# Patient Record
Sex: MALE | Race: WHITE | NOT HISPANIC OR LATINO | Employment: UNEMPLOYED | URBAN - METROPOLITAN AREA
[De-identification: names, ages, dates, MRNs, and addresses within clinical notes are randomized per-mention and may not be internally consistent; named-entity substitution may affect disease eponyms.]

---

## 2017-02-04 ENCOUNTER — OFFICE VISIT (OUTPATIENT)
Dept: RETAIL CLINIC | Facility: CLINIC | Age: 52
End: 2017-02-04

## 2017-02-04 VITALS — OXYGEN SATURATION: 96 % | HEART RATE: 94 BPM | TEMPERATURE: 98.9 F

## 2017-02-04 DIAGNOSIS — J06.9 UPPER RESPIRATORY INFECTION, ACUTE: ICD-10-CM

## 2017-02-04 DIAGNOSIS — J20.9 ACUTE BRONCHITIS WITH SYMPTOMS > 10 DAYS: Primary | ICD-10-CM

## 2017-02-04 PROCEDURE — 99213 OFFICE O/P EST LOW 20 MIN: CPT | Performed by: NURSE PRACTITIONER

## 2017-02-04 RX ORDER — AZITHROMYCIN 250 MG/1
TABLET, FILM COATED ORAL
Qty: 6 TABLET | Refills: 0 | Status: SHIPPED | OUTPATIENT
Start: 2017-02-04 | End: 2017-06-26

## 2017-02-04 RX ORDER — BENZONATATE 100 MG/1
100 CAPSULE ORAL 3 TIMES DAILY PRN
Qty: 15 CAPSULE | Refills: 0 | Status: SHIPPED | OUTPATIENT
Start: 2017-02-04 | End: 2017-02-09

## 2017-02-04 NOTE — PROGRESS NOTES
Lisa Buckley II is a 51 y.o. male who presents with a 2 week history of the following:     URI    This is a new problem. The current episode started 1 to 4 weeks ago. The problem has been gradually worsening. Maximum temperature: not monitored. Associated symptoms include congestion, coughing (productive purulent), ear pain (congestion), rhinorrhea, sinus pain and a sore throat. Pertinent negatives include no diarrhea, headaches, nausea or vomiting. He has tried acetaminophen and decongestant for the symptoms. The treatment provided mild relief.   Mr. Buckley if currently employed as a , and has frequent exposure to various illnesses.    The following portions of the patient's history were reviewed and updated as appropriate: allergies, current medications, past family history, past medical history, past social history, past surgical history and problem list.    Review of Systems   Constitutional: Negative.    HENT: Positive for congestion, ear pain (congestion), postnasal drip, rhinorrhea, sinus pressure and sore throat.    Respiratory: Positive for cough (productive purulent).    Cardiovascular: Negative.    Gastrointestinal: Negative.  Negative for diarrhea, nausea and vomiting.   Musculoskeletal: Negative.    Neurological: Negative for headaches.   Psychiatric/Behavioral: Negative.        Objective   Physical Exam   Constitutional: He appears well-developed and well-nourished.   HENT:   Head: Normocephalic and atraumatic.   Right Ear: Hearing, external ear and ear canal normal. A middle ear effusion is present.   Left Ear: Hearing, external ear and ear canal normal. A middle ear effusion is present.   Nose: Mucosal edema present. Right sinus exhibits maxillary sinus tenderness. Left sinus exhibits maxillary sinus tenderness.   Mouth/Throat: Mucous membranes are normal. Uvula swelling present. Posterior oropharyngeal erythema present. No tonsillar exudate.   Eyes: Pupils are equal, round, and  reactive to light.   Neck: Normal range of motion.   Cardiovascular: Normal rate, regular rhythm and normal heart sounds.    Pulmonary/Chest: Effort normal. He has decreased breath sounds in the right middle field and the left middle field. He has wheezes in the right upper field. He has rhonchi in the right upper field.   Abdominal: Soft.     Vitals:    02/04/17 1824   Pulse: 94   Temp: 98.9 °F (37.2 °C)   SpO2: 96%       Assessment/Plan   Amrita was seen today for uri.    Diagnoses and all orders for this visit:    Acute bronchitis with symptoms > 10 days  -     benzonatate (TESSALON) 100 MG capsule; Take 1 capsule by mouth 3 (Three) Times a Day As Needed for cough for up to 5 days.    Upper respiratory infection, acute  -     azithromycin (ZITHROMAX Z-VIV) 250 MG tablet; Take 2 tablets the first day, then 1 tablet daily for 4 days.  -     benzonatate (TESSALON) 100 MG capsule; Take 1 capsule by mouth 3 (Three) Times a Day As Needed for cough for up to 5 days.       Reviewed home care instructions, and patient verbalized understanding. Patient instructed to follow up with PCP and/or ED for worsening or non-resolving symptoms.     YUSRA Wynn

## 2017-02-04 NOTE — PATIENT INSTRUCTIONS
Acute Bronchitis  Bronchitis is inflammation of the airways that extend from the windpipe into the lungs (bronchi). The inflammation often causes mucus to develop. This leads to a cough, which is the most common symptom of bronchitis.   In acute bronchitis, the condition usually develops suddenly and goes away over time, usually in a couple weeks. Smoking, allergies, and asthma can make bronchitis worse. Repeated episodes of bronchitis may cause further lung problems.   CAUSES  Acute bronchitis is most often caused by the same virus that causes a cold. The virus can spread from person to person (contagious) through coughing, sneezing, and touching contaminated objects.  SIGNS AND SYMPTOMS   Cough.    Fever.    Coughing up mucus.    Body aches.    Chest congestion.    Chills.    Shortness of breath.    Sore throat.    DIAGNOSIS   Acute bronchitis is usually diagnosed through a physical exam. Your health care provider will also ask you questions about your medical history. Tests, such as chest X-rays, are sometimes done to rule out other conditions.   TREATMENT   Acute bronchitis usually goes away in a couple weeks. Oftentimes, no medical treatment is necessary. Medicines are sometimes given for relief of fever or cough. Antibiotic medicines are usually not needed but may be prescribed in certain situations. In some cases, an inhaler may be recommended to help reduce shortness of breath and control the cough. A cool mist vaporizer may also be used to help thin bronchial secretions and make it easier to clear the chest.   HOME CARE INSTRUCTIONS  Get plenty of rest.    Drink enough fluids to keep your urine clear or pale yellow (unless you have a medical condition that requires fluid restriction). Increasing fluids may help thin your respiratory secretions (sputum) and reduce chest congestion, and it will prevent dehydration.    Take medicines only as directed by your health care provider.  If you were prescribed an  antibiotic medicine, finish it all even if you start to feel better.  Avoid smoking and secondhand smoke. Exposure to cigarette smoke or irritating chemicals will make bronchitis worse. If you are a smoker, consider using nicotine gum or skin patches to help control withdrawal symptoms. Quitting smoking will help your lungs heal faster.    Reduce the chances of another bout of acute bronchitis by washing your hands frequently, avoiding people with cold symptoms, and trying not to touch your hands to your mouth, nose, or eyes.    Keep all follow-up visits as directed by your health care provider.    SEEK MEDICAL CARE IF:  Your symptoms do not improve after 1 week of treatment.   SEEK IMMEDIATE MEDICAL CARE IF:  You develop an increased fever or chills.    You have chest pain.    You have severe shortness of breath.  You have bloody sputum.    You develop dehydration.  You faint or repeatedly feel like you are going to pass out.  You develop repeated vomiting.  You develop a severe headache.  MAKE SURE YOU:   Understand these instructions.  Will watch your condition.  Will get help right away if you are not doing well or get worse.     This information is not intended to replace advice given to you by your health care provider. Make sure you discuss any questions you have with your health care provider.     Document Released: 01/25/2006 Document Revised: 01/08/2016 Document Reviewed: 06/10/2014  [employer] Interactive Patient Education ©2016 [employer]  Sinusitis, Adult  Sinusitis is redness, soreness, and inflammation of the paranasal sinuses. Paranasal sinuses are air pockets within the bones of your face. They are located beneath your eyes, in the middle of your forehead, and above your eyes. In healthy paranasal sinuses, mucus is able to drain out, and air is able to circulate through them by way of your nose. However, when your paranasal sinuses are inflamed, mucus and air can become trapped. This can allow  bacteria and other germs to grow and cause infection.  Sinusitis can develop quickly and last only a short time (acute) or continue over a long period (chronic). Sinusitis that lasts for more than 12 weeks is considered chronic.  CAUSES  Causes of sinusitis include:  · Allergies.  · Structural abnormalities, such as displacement of the cartilage that separates your nostrils (deviated septum), which can decrease the air flow through your nose and sinuses and affect sinus drainage.  · Functional abnormalities, such as when the small hairs (cilia) that line your sinuses and help remove mucus do not work properly or are not present.  SIGNS AND SYMPTOMS  Symptoms of acute and chronic sinusitis are the same. The primary symptoms are pain and pressure around the affected sinuses. Other symptoms include:  · Upper toothache.  · Earache.  · Headache.  · Bad breath.  · Decreased sense of smell and taste.  · A cough, which worsens when you are lying flat.  · Fatigue.  · Fever.  · Thick drainage from your nose, which often is green and may contain pus (purulent).  · Swelling and warmth over the affected sinuses.  DIAGNOSIS  Your health care provider will perform a physical exam. During your exam, your health care provider may perform any of the following to help determine if you have acute sinusitis or chronic sinusitis:  · Look in your nose for signs of abnormal growths in your nostrils (nasal polyps).  · Tap over the affected sinus to check for signs of infection.  · View the inside of your sinuses using an imaging device that has a light attached (endoscope).  If your health care provider suspects that you have chronic sinusitis, one or more of the following tests may be recommended:  · Allergy tests.  · Nasal culture. A sample of mucus is taken from your nose, sent to a lab, and screened for bacteria.  · Nasal cytology. A sample of mucus is taken from your nose and examined by your health care provider to determine if your  sinusitis is related to an allergy.  TREATMENT  Most cases of acute sinusitis are related to a viral infection and will resolve on their own within 10 days. Sometimes, medicines are prescribed to help relieve symptoms of both acute and chronic sinusitis. These may include pain medicines, decongestants, nasal steroid sprays, or saline sprays.  However, for sinusitis related to a bacterial infection, your health care provider will prescribe antibiotic medicines. These are medicines that will help kill the bacteria causing the infection.  Rarely, sinusitis is caused by a fungal infection. In these cases, your health care provider will prescribe antifungal medicine.  For some cases of chronic sinusitis, surgery is needed. Generally, these are cases in which sinusitis recurs more than 3 times per year, despite other treatments.  HOME CARE INSTRUCTIONS  · Drink plenty of water. Water helps thin the mucus so your sinuses can drain more easily.  · Use a humidifier.  · Inhale steam 3-4 times a day (for example, sit in the bathroom with the shower running).  · Apply a warm, moist washcloth to your face 3-4 times a day, or as directed by your health care provider.  · Use saline nasal sprays to help moisten and clean your sinuses.  · Take medicines only as directed by your health care provider.  · If you were prescribed either an antibiotic or antifungal medicine, finish it all even if you start to feel better.  SEEK IMMEDIATE MEDICAL CARE IF:  · You have increasing pain or severe headaches.  · You have nausea, vomiting, or drowsiness.  · You have swelling around your face.  · You have vision problems.  · You have a stiff neck.  · You have difficulty breathing.     This information is not intended to replace advice given to you by your health care provider. Make sure you discuss any questions you have with your health care provider.     Document Released: 12/18/2006 Document Revised: 01/08/2016 Document Reviewed:  01/01/2013  [employer] Interactive Patient Education ©2016 [employer]

## 2017-06-26 ENCOUNTER — OFFICE VISIT (OUTPATIENT)
Dept: RETAIL CLINIC | Facility: CLINIC | Age: 52
End: 2017-06-26

## 2017-06-26 VITALS
OXYGEN SATURATION: 98 % | SYSTOLIC BLOOD PRESSURE: 132 MMHG | HEIGHT: 70 IN | HEART RATE: 76 BPM | RESPIRATION RATE: 20 BRPM | DIASTOLIC BLOOD PRESSURE: 84 MMHG | TEMPERATURE: 98.2 F | WEIGHT: 268 LBS | BODY MASS INDEX: 38.37 KG/M2

## 2017-06-26 DIAGNOSIS — J06.9 ACUTE URI: Primary | ICD-10-CM

## 2017-06-26 PROCEDURE — 99213 OFFICE O/P EST LOW 20 MIN: CPT | Performed by: NURSE PRACTITIONER

## 2017-06-26 NOTE — PROGRESS NOTES
Subjective   Amrita Buckley II is a 52 y.o. male presents with cold/upper respiratory symptoms.  Patient states got sick on Friday, states that he seems to be getting better but not able to go to work today due to his type of work.  States that he needs work note.  States has been taking OTC cold medicines with some relief.    URI    This is a new problem. The current episode started in the past 7 days. The problem has been gradually improving. There has been no fever. Associated symptoms include congestion, coughing (mild dry cough), ear pain (bilat ear pressure), a plugged ear sensation, rhinorrhea and a sore throat. Pertinent negatives include no abdominal pain, chest pain, diarrhea, dysuria, headaches, joint pain, joint swelling, nausea, neck pain, rash, sinus pain, sneezing, swollen glands, vomiting or wheezing. He has tried antihistamine and decongestant (OTC cold medicines) for the symptoms. The treatment provided mild relief.        The following portions of the patient's history were reviewed and updated as appropriate: allergies, current medications, past family history, past medical history, past social history and past surgical history.    Review of Systems   Constitutional: Negative for chills, fever and unexpected weight change.   HENT: Positive for congestion, ear pain (bilat ear pressure), postnasal drip, rhinorrhea and sore throat. Negative for sinus pressure, sneezing, trouble swallowing and voice change.    Eyes: Negative.    Respiratory: Positive for cough (mild dry cough). Negative for chest tightness, shortness of breath and wheezing.    Cardiovascular: Negative.  Negative for chest pain.   Gastrointestinal: Negative.  Negative for abdominal pain, diarrhea, nausea and vomiting.   Genitourinary: Negative.  Negative for dysuria.   Musculoskeletal: Negative.  Negative for joint pain and neck pain.   Skin: Negative.  Negative for rash.   Neurological: Negative.  Negative for headaches.        Objective   Physical Exam   Constitutional: He is oriented to person, place, and time. He appears well-developed and well-nourished. No distress.   HENT:   Head: Normocephalic and atraumatic.   Right Ear: A middle ear effusion is present.   Left Ear: A middle ear effusion is present.   Nose: Mucosal edema present.   Mouth/Throat: Uvula is midline and mucous membranes are normal. Posterior oropharyngeal erythema (mild erythema with PND) present. Tonsils are 0 on the right. Tonsils are 0 on the left. No tonsillar exudate.   Eyes: Conjunctivae and lids are normal. Pupils are equal, round, and reactive to light.   Neck: Normal range of motion.   Cardiovascular: Normal rate, regular rhythm and normal heart sounds.    No murmur heard.  Pulmonary/Chest: Effort normal and breath sounds normal. No respiratory distress.   Lymphadenopathy:     He has no cervical adenopathy.   Neurological: He is alert and oriented to person, place, and time.   Skin: Skin is warm and dry.   Psychiatric: He has a normal mood and affect. His speech is normal and behavior is normal. Thought content normal.       Assessment/Plan   Amrita was seen today for uri.    Diagnoses and all orders for this visit:    Acute URI      Continue Flonase, take Claritin per bottle instructions.  Rest, plenty of fluids, humidifier, warm salt water gargles, comfort measures and follow up with your PCP if symptoms persist.  If worse go to urgent care or ER as discussed.  Patient verbalized understanding.    YUSRA Weathers

## 2017-06-26 NOTE — PATIENT INSTRUCTIONS
"Upper Respiratory Infection, Adult  Most upper respiratory infections (URIs) are caused by a virus. A URI affects the nose, throat, and upper air passages. The most common type of URI is often called \"the common cold.\"  HOME CARE   · Take medicines only as told by your doctor.  · Gargle warm saltwater or take cough drops to comfort your throat as told by your doctor.  · Use a warm mist humidifier or inhale steam from a shower to increase air moisture. This may make it easier to breathe.  · Drink enough fluid to keep your pee (urine) clear or pale yellow.  · Eat soups and other clear broths.  · Have a healthy diet.  · Rest as needed.  · Go back to work when your fever is gone or your doctor says it is okay.  ¨ You may need to stay home longer to avoid giving your URI to others.  ¨ You can also wear a face mask and wash your hands often to prevent spread of the virus.  · Use your inhaler more if you have asthma.  · Do not use any tobacco products, including cigarettes, chewing tobacco, or electronic cigarettes. If you need help quitting, ask your doctor.  GET HELP IF:  · You are getting worse, not better.  · Your symptoms are not helped by medicine.  · You have chills.  · You are getting more short of breath.  · You have brown or red mucus.  · You have yellow or brown discharge from your nose.  · You have pain in your face, especially when you bend forward.  · You have a fever.  · You have puffy (swollen) neck glands.  · You have pain while swallowing.  · You have white areas in the back of your throat.  GET HELP RIGHT AWAY IF:   · You have very bad or constant:    Headache.    Ear pain.    Pain in your forehead, behind your eyes, and over your cheekbones (sinus pain).    Chest pain.  · You have long-lasting (chronic) lung disease and any of the following:    Wheezing.    Long-lasting cough.    Coughing up blood.    A change in your usual mucus.  · You have a stiff neck.  · You have changes in your:    Vision.   "  Hearing.    Thinking.    Mood.  MAKE SURE YOU:   · Understand these instructions.  · Will watch your condition.  · Will get help right away if you are not doing well or get worse.     This information is not intended to replace advice given to you by your health care provider. Make sure you discuss any questions you have with your health care provider.    Continue Flonase, take Claritin per bottle instructions.  Rest, plenty of fluids, humidifier, warm salt water gargles, comfort measures and follow up with your PCP if symptoms persist.  If worse go to urgent care or ER if worse.     Document Released: 06/05/2009 Document Revised: 05/03/2016 Document Reviewed: 03/25/2015  [employer] Interactive Patient Education ©2017 [employer]

## 2018-01-11 ENCOUNTER — OFFICE VISIT (OUTPATIENT)
Dept: RETAIL CLINIC | Facility: CLINIC | Age: 53
End: 2018-01-11

## 2018-01-11 VITALS
TEMPERATURE: 98.1 F | OXYGEN SATURATION: 96 % | SYSTOLIC BLOOD PRESSURE: 110 MMHG | HEART RATE: 75 BPM | HEIGHT: 71 IN | DIASTOLIC BLOOD PRESSURE: 72 MMHG | RESPIRATION RATE: 18 BRPM | WEIGHT: 260 LBS | BODY MASS INDEX: 36.4 KG/M2

## 2018-01-11 DIAGNOSIS — S86.819A STRAIN OF CALF MUSCLE, INITIAL ENCOUNTER: ICD-10-CM

## 2018-01-11 DIAGNOSIS — J11.1 BRONCHITIS WITH FLU: Primary | ICD-10-CM

## 2018-01-11 PROCEDURE — 99214 OFFICE O/P EST MOD 30 MIN: CPT | Performed by: NURSE PRACTITIONER

## 2018-01-11 RX ORDER — ALBUTEROL SULFATE 90 UG/1
2 AEROSOL, METERED RESPIRATORY (INHALATION) EVERY 4 HOURS PRN
Qty: 1 INHALER | Refills: 0 | Status: SHIPPED | OUTPATIENT
Start: 2018-01-11 | End: 2018-12-19

## 2018-01-11 RX ORDER — BENZONATATE 100 MG/1
100 CAPSULE ORAL 3 TIMES DAILY PRN
Qty: 30 CAPSULE | Refills: 0 | Status: SHIPPED | OUTPATIENT
Start: 2018-01-11 | End: 2021-11-17

## 2018-01-11 RX ORDER — DICLOFENAC POTASSIUM 50 MG/1
50 TABLET, FILM COATED ORAL 3 TIMES DAILY PRN
Qty: 21 TABLET | Refills: 0 | Status: SHIPPED | OUTPATIENT
Start: 2018-01-11 | End: 2018-03-30

## 2018-01-11 RX ORDER — AZITHROMYCIN 250 MG/1
TABLET, FILM COATED ORAL
Qty: 6 TABLET | Refills: 0 | Status: SHIPPED | OUTPATIENT
Start: 2018-01-11 | End: 2018-03-30

## 2018-01-11 NOTE — PATIENT INSTRUCTIONS
Acute Bronchitis  Bronchitis is inflammation of the airways that extend from the windpipe into the lungs (bronchi). The inflammation often causes mucus to develop. This leads to a cough, which is the most common symptom of bronchitis.   In acute bronchitis, the condition usually develops suddenly and goes away over time, usually in a couple weeks. Smoking, allergies, and asthma can make bronchitis worse. Repeated episodes of bronchitis may cause further lung problems.   CAUSES  Acute bronchitis is most often caused by the same virus that causes a cold. The virus can spread from person to person (contagious) through coughing, sneezing, and touching contaminated objects.  SIGNS AND SYMPTOMS   · Cough.    · Fever.    · Coughing up mucus.    · Body aches.    · Chest congestion.    · Chills.    · Shortness of breath.    · Sore throat.    DIAGNOSIS   Acute bronchitis is usually diagnosed through a physical exam. Your health care provider will also ask you questions about your medical history. Tests, such as chest X-rays, are sometimes done to rule out other conditions.   TREATMENT   Acute bronchitis usually goes away in a couple weeks. Oftentimes, no medical treatment is necessary. Medicines are sometimes given for relief of fever or cough. Antibiotic medicines are usually not needed but may be prescribed in certain situations. In some cases, an inhaler may be recommended to help reduce shortness of breath and control the cough. A cool mist vaporizer may also be used to help thin bronchial secretions and make it easier to clear the chest.   HOME CARE INSTRUCTIONS  · Get plenty of rest.    · Drink enough fluids to keep your urine clear or pale yellow (unless you have a medical condition that requires fluid restriction). Increasing fluids may help thin your respiratory secretions (sputum) and reduce chest congestion, and it will prevent dehydration.    · Take medicines only as directed by your health care provider.  · If  "you were prescribed an antibiotic medicine, finish it all even if you start to feel better.  · Avoid smoking and secondhand smoke. Exposure to cigarette smoke or irritating chemicals will make bronchitis worse. If you are a smoker, consider using nicotine gum or skin patches to help control withdrawal symptoms. Quitting smoking will help your lungs heal faster.    · Reduce the chances of another bout of acute bronchitis by washing your hands frequently, avoiding people with cold symptoms, and trying not to touch your hands to your mouth, nose, or eyes.    · Keep all follow-up visits as directed by your health care provider.    SEEK MEDICAL CARE IF:  Your symptoms do not improve after 1 week of treatment.   SEEK IMMEDIATE MEDICAL CARE IF:  · You develop an increased fever or chills.    · You have chest pain.    · You have severe shortness of breath.  · You have bloody sputum.    · You develop dehydration.  · You faint or repeatedly feel like you are going to pass out.  · You develop repeated vomiting.  · You develop a severe headache.  MAKE SURE YOU:   · Understand these instructions.  · Will watch your condition.  · Will get help right away if you are not doing well or get worse.     This information is not intended to replace advice given to you by your health care provider. Make sure you discuss any questions you have with your health care provider.     Document Released: 01/25/2006 Document Revised: 01/08/2016 Document Reviewed: 06/10/2014  [employer] Interactive Patient Education ©2017 [employer]  Influenza, Adult  Influenza, more commonly known as \"the flu,\" is a viral infection that primarily affects the respiratory tract. The respiratory tract includes organs that help you breathe, such as the lungs, nose, and throat. The flu causes many common cold symptoms, as well as a high fever and body aches.  The flu spreads easily from person to person (is contagious). Getting a flu shot (influenza vaccination) every " year is the best way to prevent influenza.  CAUSES  Influenza is caused by a virus. You can catch the virus by:  · Breathing in droplets from an infected person's cough or sneeze.  · Touching something that was recently contaminated with the virus and then touching your mouth, nose, or eyes.  RISK FACTORS  The following factors may make you more likely to get the flu:  · Not cleaning your hands frequently with soap and water or alcohol-based hand .  · Having close contact with many people during cold and flu season.  · Touching your mouth, eyes, or nose without washing or sanitizing your hands first.  · Not drinking enough fluids or not eating a healthy diet.  · Not getting enough sleep or exercise.  · Being under a high amount of stress.  · Not getting a yearly (annual) flu shot.  You may be at a higher risk of complications from the flu, such as a severe lung infection (pneumonia), if you:  · Are over the age of 65.  · Are pregnant.  · Have a weakened disease-fighting system (immune system). You may have a weakened immune system if you:    Have HIV or AIDS.    Are undergoing chemotherapy.    Are taking medicines that reduce the activity of (suppress) the immune system.  · Have a long-term (chronic) illness, such as heart disease, kidney disease, diabetes, or lung disease.  · Have a liver disorder.  · Are obese.  · Have anemia.  SYMPTOMS  Symptoms of this condition typically last 4-10 days and may include:  · Fever.  · Chills.  · Headache, body aches, or muscle aches.  · Sore throat.  · Cough.  · Runny or congested nose.  · Chest discomfort and cough.  · Poor appetite.  · Weakness or tiredness (fatigue).  · Dizziness.  · Nausea or vomiting.  DIAGNOSIS  This condition may be diagnosed based on your medical history and a physical exam. Your health care provider may do a nose or throat swab test to confirm the diagnosis.  TREATMENT  If influenza is detected early, you can be treated with antiviral medicine  that can reduce the length of your illness and the severity of your symptoms. This medicine may be given by mouth (orally) or through an IV tube that is inserted in one of your veins.  The goal of treatment is to relieve symptoms by taking care of yourself at home. This may include taking over-the-counter medicines, drinking plenty of fluids, and adding humidity to the air in your home.  In some cases, influenza goes away on its own. Severe influenza or complications from influenza may be treated in a hospital.  HOME CARE INSTRUCTIONS  · Take over-the-counter and prescription medicines only as told by your health care provider.  · Use a cool mist humidifier to add humidity to the air in your home. This can make breathing easier.  · Rest as needed.  · Drink enough fluid to keep your urine clear or pale yellow.  · Cover your mouth and nose when you cough or sneeze.  · Wash your hands with soap and water often, especially after you cough or sneeze. If soap and water are not available, use hand .  · Stay home from work or school as told by your health care provider. Unless you are visiting your health care provider, try to avoid leaving home until your fever has been gone for 24 hours without the use of medicine.  · Keep all follow-up visits as told by your health care provider. This is important.  PREVENTION  · Getting an annual flu shot is the best way to avoid getting the flu. You may get the flu shot in late summer, fall, or winter. Ask your health care provider when you should get your flu shot.  · Wash your hands often or use hand  often.  · Avoid contact with people who are sick during cold and flu season.  · Eat a healthy diet, drink plenty of fluids, get enough sleep, and exercise regularly.  SEEK MEDICAL CARE IF:  · You develop new symptoms.  · You have:    Chest pain.    Diarrhea.    A fever.  · Your cough gets worse.  · You produce more mucus.  · You feel nauseous or you vomit.  SEEK  IMMEDIATE MEDICAL CARE IF:  · You develop shortness of breath or difficulty breathing.  · Your skin or nails turn a bluish color.  · You have severe pain or stiffness in your neck.  · You develop a sudden headache or sudden pain in your face or ear.  · You cannot stop vomiting.     This information is not intended to replace advice given to you by your health care provider. Make sure you discuss any questions you have with your health care provider.     Document Released: 12/15/2001 Document Revised: 04/10/2017 Document Reviewed: 10/11/2016  [employer] Interactive Patient Education ©2017 [employer]    See your primary care provider if not improved within 5 days, sooner if worse or new symptoms  Drink plenty of water and other decaffeinated fluids  You may continue acetaminophen and ibuprofen as needed

## 2018-01-11 NOTE — PROGRESS NOTES
Subjective   Cough    Amrita Buckley II is a 52 y.o. male diabetic patient who reports a 1 month history of bronchitis, now with complains of fever, malaise, worsening cough and nasal congestion last few days (>48 hours). Patient also complains of left calf pain, onset 12/25/17 after climbing stairs. Patient is an , has concerns for DVT.        Cough   This is a new problem. The current episode started more than 1 month ago. The problem has been rapidly worsening. The problem occurs every few minutes. The cough is non-productive. Associated symptoms include chills, a fever, headaches (mild), myalgias, nasal congestion and rhinorrhea. Pertinent negatives include no chest pain, ear pain, hemoptysis, sore throat, shortness of breath, weight loss or wheezing. Nothing aggravates the symptoms. He has tried OTC cough suppressant, rest and body position changes for the symptoms. The treatment provided no relief. There is no history of asthma.   Muscle Pain   This is a new problem. The current episode started 1 to 4 weeks ago. The problem occurs intermittently. The problem has been gradually improving since onset. The pain occurs in the context of recent physical stress. The pain is present in the left lower leg. The pain is medium. The symptoms are aggravated by exercise and any movement. Associated symptoms include fatigue, a fever and headaches (mild). Pertinent negatives include no chest pain, diarrhea, joint swelling, nausea, shortness of breath, vomiting or wheezing. Past treatments include cold pack, OTC NSAID and rest (compression wrap). There is no swelling present.        History Obtained from: Patient    Past Medical History:   Diagnosis Date   • Bronchitis    • Elevated cholesterol    • Hypertension    • Type 2 diabetes mellitus      Past Surgical History:   Procedure Laterality Date   • ROTATOR CUFF REPAIR Left      Social History     Social History   • Marital status: Single     Spouse name: N/A   •  Number of children: N/A   • Years of education: N/A     Occupational History   • Not on file.     Social History Main Topics   • Smoking status: Never Smoker   • Smokeless tobacco: Never Used   • Alcohol use No   • Drug use: No   • Sexual activity: Defer     Other Topics Concern   • Not on file     Social History Narrative     Family History   Problem Relation Age of Onset   • Lung disease Mother    • Heart disease Mother    • Diabetes Father      No Known Allergies  Current Outpatient Prescriptions   Medication Sig Dispense Refill   • AMLODIPINE BESYLATE PO Take  by mouth.     • atorvastatin (LIPITOR) 10 MG tablet TAKE ONE TABLET BY MOUTH DAILY 30 tablet 1   • Blood Glucose Monitoring Suppl ([employer] 2 SYSTEM) W/DEVICE kit      • JANUVIA 100 MG tablet TAKE ONE TABLET BY MOUTH DAILY 30 tablet 3   • metFORMIN (GLUCOPHAGE) 1000 MG tablet Take 1,000 mg by mouth 2 (Two) Times a Day With Meals.     • Nebivolol HCl (BYSTOLIC PO) Take  by mouth.     • ONE TOUCH ULTRA TEST test strip USE ONE STRIP TO TEST THREE TIMES A  each 2   • albuterol (PROVENTIL HFA;VENTOLIN HFA) 108 (90 Base) MCG/ACT inhaler Inhale 2 puffs Every 4 (Four) Hours As Needed for Wheezing. 1 inhaler 0   • azithromycin (ZITHROMAX Z-VIV) 250 MG tablet Take 2 tablets the first day, then 1 tablet daily for 4 days. 6 tablet 0   • benzonatate (TESSALON PERLES) 100 MG capsule Take 1 capsule by mouth 3 (Three) Times a Day As Needed for Cough. 30 capsule 0   • diclofenac (CATAFLAM) 50 MG tablet Take 1 tablet by mouth 3 (Three) Times a Day As Needed (pain). 21 tablet 0     No current facility-administered medications for this visit.         The following portions of the patient's history were reviewed and updated as appropriate: allergies, current medications, past family history, past medical history, past social history and past surgical history.    Review of Systems   Constitutional: Positive for chills, diaphoresis, fatigue and fever. Negative for  "weight loss.   HENT: Positive for congestion and rhinorrhea. Negative for ear pain, sore throat, trouble swallowing and voice change.    Respiratory: Positive for cough and chest tightness. Negative for hemoptysis, shortness of breath and wheezing.    Cardiovascular: Negative for chest pain, palpitations and leg swelling.   Gastrointestinal: Negative for diarrhea, nausea and vomiting.   Musculoskeletal: Positive for myalgias. Negative for arthralgias, back pain, joint swelling, neck pain and neck stiffness.   Neurological: Positive for headaches (mild). Negative for dizziness, light-headedness and numbness.   Hematological: Negative for adenopathy.   Psychiatric/Behavioral: Negative.        Objective     VITAL SIGNS:   Vitals:    01/11/18 1815   BP: 110/72   Pulse: 75   Resp: 18   Temp: 98.1 °F (36.7 °C)   TempSrc: Oral   SpO2: 96%   Weight: 118 kg (260 lb)   Height: 180.3 cm (71\")   Body mass index is 36.26 kg/(m^2).    Physical Exam   Constitutional:  Non-toxic appearance. He appears ill. No distress.   HENT:   Head: Normocephalic and atraumatic.   Right Ear: External ear and ear canal normal. Tympanic membrane is erythematous. Tympanic membrane is not perforated. A middle ear effusion is present.   Left Ear: External ear and ear canal normal. Tympanic membrane is erythematous. A middle ear effusion is present.   Nose: Mucosal edema and rhinorrhea present.   Mouth/Throat: Mucous membranes are normal. Posterior oropharyngeal erythema present. No posterior oropharyngeal edema. No tonsillar exudate.   Brisk erythema to bilateral nasal mucosa   Eyes: Conjunctivae, EOM and lids are normal. No scleral icterus.   Neck: Phonation normal. Neck supple. No rigidity. No tracheal deviation present.   Cardiovascular: Normal rate and regular rhythm.    No murmur heard.  Pulses:       Popliteal pulses are 2+ on the left side.        Dorsalis pedis pulses are 2+ on the left side.        Posterior tibial pulses are 2+ on the left " side.   Pulmonary/Chest: No accessory muscle usage. No respiratory distress. He has no wheezes. He has no rhonchi. He has no rales.   Musculoskeletal:        Left lower leg: He exhibits tenderness. He exhibits no bony tenderness, no swelling, no edema and no deformity.   2-3 cm firm mass to medial aspect of RLE at knee. No discoloration.  Patient reports is chronic finding from old injury.        Vascular Status -  His exam exhibits no left foot edema.  Lymphadenopathy:     He has no cervical adenopathy.        Right: No supraclavicular adenopathy present.        Left: No supraclavicular adenopathy present.   Neurological: He is alert. He has normal strength. No sensory deficit. Gait normal.   Skin: Skin is warm and dry. No cyanosis. No pallor. Nails show no clubbing.   Psychiatric: He has a normal mood and affect. His behavior is normal. He is attentive.       LABS:       Assessment/Plan     Amrita was seen today for cough.  Diagnoses and all orders for this visit:    Bronchitis with flu  Patient with bronchitis symptoms approx 3 weeks along with new acute symptoms and clinical exam findings consistent with flu. He is a diabetic with increased risk of secondary respiratory complications, especially with recent bronchitis.    Strain of calf muscle, initial encounter  There is no convincing evidence for DVT on exam today. Given the patient's history and exam, findings are more suggestive of acute muscle strain.     Other orders  -     azithromycin (ZITHROMAX Z-VIV) 250 MG tablet; Take 2 tablets the first day, then 1 tablet daily for 4 days.  -     benzonatate (TESSALON PERLES) 100 MG capsule; Take 1 capsule by mouth 3 (Three) Times a Day As Needed for Cough (Per patient request)  -     albuterol (PROVENTIL HFA;VENTOLIN HFA) 108 (90 Base) MCG/ACT inhaler; Inhale 2 puffs Every 4 (Four) Hours As Needed for Wheezing or chest tightness.  -     diclofenac (CATAFLAM) 50 MG tablet; Take 1 tablet by mouth 3 (Three) Times a  Day As Needed (pain).        -     Rest, gentle stretching of LLE, alternate ice/heat and compression to affected extremity. Follow up with orthopaedic as needed.     PLAN:  Patient should follow up with primary care provider if symptoms fail to improve, worsen or for the development of new symptoms that need attention.    The patient voiced understanding and agreement to the patient treatment plan and instructions     YUSRA Mays

## 2018-03-30 ENCOUNTER — OFFICE VISIT (OUTPATIENT)
Dept: RETAIL CLINIC | Facility: CLINIC | Age: 53
End: 2018-03-30

## 2018-03-30 DIAGNOSIS — J06.9 VIRAL UPPER RESPIRATORY TRACT INFECTION: Primary | ICD-10-CM

## 2018-03-30 PROCEDURE — 99213 OFFICE O/P EST LOW 20 MIN: CPT | Performed by: NURSE PRACTITIONER

## 2018-03-30 RX ORDER — DEXTROMETHORPHAN HYDROBROMIDE AND PROMETHAZINE HYDROCHLORIDE 15; 6.25 MG/5ML; MG/5ML
5 SYRUP ORAL 4 TIMES DAILY PRN
Qty: 120 ML | Refills: 0 | Status: SHIPPED | OUTPATIENT
Start: 2018-03-30 | End: 2018-04-06

## 2018-03-30 RX ORDER — AZITHROMYCIN 250 MG/1
TABLET, FILM COATED ORAL
Qty: 6 TABLET | Refills: 0 | Status: SHIPPED | OUTPATIENT
Start: 2018-03-30 | End: 2018-12-19

## 2018-03-30 RX ORDER — BENZONATATE 100 MG/1
100 CAPSULE ORAL 3 TIMES DAILY PRN
Qty: 60 CAPSULE | Refills: 0 | Status: SHIPPED | OUTPATIENT
Start: 2018-03-30 | End: 2021-11-17

## 2018-03-30 RX ORDER — PSEUDOEPHEDRINE HCL 120 MG/1
120 TABLET, FILM COATED, EXTENDED RELEASE ORAL EVERY 12 HOURS
Qty: 30 TABLET | Refills: 0 | Status: SHIPPED | OUTPATIENT
Start: 2018-03-30 | End: 2021-11-17

## 2018-03-30 NOTE — PROGRESS NOTES
Subjective   Amrita Buckley II is a 52 y.o. male.   Chief Complaint   Patient presents with   • URI      53 yo w/m presents with complaint of nasal and ear congestion, nonproductive cough, runny nose and sore throat duration of 10 days and not feeling better.  Pt reports he is a  and is unable to fly/work if he is congested.  He has taken various OTC meds without relief.  See ros for additional information.      URI    This is a new problem. The current episode started 1 to 4 weeks ago (10 days). The problem has been gradually worsening. There has been no fever. Associated symptoms include congestion, coughing, a plugged ear sensation, rhinorrhea and a sore throat. Pertinent negatives include no ear pain, rash, sinus pain or wheezing. He has tried antihistamine for the symptoms. The treatment provided no relief.        The following portions of the patient's history were reviewed and updated as appropriate: allergies, current medications, past family history, past medical history, past social history, past surgical history and problem list.    Current Outpatient Prescriptions:   •  albuterol (PROVENTIL HFA;VENTOLIN HFA) 108 (90 Base) MCG/ACT inhaler, Inhale 2 puffs Every 4 (Four) Hours As Needed for Wheezing., Disp: 1 inhaler, Rfl: 0  •  AMLODIPINE BESYLATE PO, Take  by mouth., Disp: , Rfl:   •  atorvastatin (LIPITOR) 10 MG tablet, TAKE ONE TABLET BY MOUTH DAILY, Disp: 30 tablet, Rfl: 1  •  benzonatate (TESSALON PERLES) 100 MG capsule, Take 1 capsule by mouth 3 (Three) Times a Day As Needed for Cough., Disp: 30 capsule, Rfl: 0  •  Blood Glucose Monitoring Suppl ([employer] 2 SYSTEM) W/DEVICE kit, , Disp: , Rfl:   •  linagliptin (TRADJENTA) 5 MG tablet tablet, Take 5 mg by mouth Daily., Disp: , Rfl:   •  metFORMIN (GLUCOPHAGE) 1000 MG tablet, Take 1,000 mg by mouth 2 (Two) Times a Day With Meals., Disp: , Rfl:   •  Nebivolol HCl (BYSTOLIC PO), Take  by mouth., Disp: , Rfl:   •  ONE TOUCH ULTRA TEST test strip,  USE ONE STRIP TO TEST THREE TIMES A DAY, Disp: 100 each, Rfl: 2  •  azithromycin (ZITHROMAX Z-VIV) 250 MG tablet, Take 2 tablets the first day, then 1 tablet daily for 4 days., Disp: 6 tablet, Rfl: 0  •  benzonatate (TESSALON PERLES) 100 MG capsule, Take 1 capsule by mouth 3 (Three) Times a Day As Needed for Cough., Disp: 60 capsule, Rfl: 0  •  promethazine-dextromethorphan (PROMETHAZINE-DM) 6.25-15 MG/5ML syrup, Take 5 mL by mouth 4 (Four) Times a Day As Needed for Cough for up to 7 days., Disp: 120 mL, Rfl: 0  •  pseudoephedrine (SUDAFED 12 HOUR) 120 MG 12 hr tablet, Take 1 tablet by mouth Every 12 (Twelve) Hours. Take prn, monitor BP,  DC if over 140/90, Disp: 30 tablet, Rfl: 0    Review of Systems   Constitutional: Positive for activity change. Negative for appetite change and fever.   HENT: Positive for congestion, hearing loss, rhinorrhea, sinus pressure and sore throat. Negative for ear discharge, ear pain, postnasal drip and sinus pain.    Eyes: Negative.    Respiratory: Positive for cough. Negative for apnea, choking, chest tightness, shortness of breath, wheezing and stridor.    Cardiovascular: Negative.    Gastrointestinal: Negative.    Skin: Negative for rash.     There were no vitals taken for this visit.    Objective   No Known Allergies    Physical Exam   Constitutional: He is oriented to person, place, and time. He appears well-developed and well-nourished. No distress.   HENT:   Head: Normocephalic.   Right Ear: External ear and ear canal normal. A middle ear effusion is present.   Left Ear: External ear and ear canal normal. A middle ear effusion is present.   Nose: Mucosal edema present. Right sinus exhibits no maxillary sinus tenderness and no frontal sinus tenderness. Left sinus exhibits no maxillary sinus tenderness and no frontal sinus tenderness.   Mouth/Throat: Uvula is midline, oropharynx is clear and moist and mucous membranes are normal. Tonsils are 0 on the right. Tonsils are 0 on the  left. No tonsillar exudate.   Eyes: Conjunctivae are normal.   Neck: Normal range of motion. No JVD present. No tracheal deviation present. No thyromegaly present.   Cardiovascular: Normal rate, regular rhythm and normal heart sounds.    Pulmonary/Chest: Effort normal and breath sounds normal. No stridor. No respiratory distress. He has no wheezes. He has no rales. He exhibits no tenderness.   Lymphadenopathy:     He has no cervical adenopathy.   Neurological: He is alert and oriented to person, place, and time.   Skin: Skin is warm. Capillary refill takes less than 2 seconds. He is not diaphoretic.   Vitals reviewed.      Assessment/Plan   Amrita was seen today for uri.    Diagnoses and all orders for this visit:    Viral upper respiratory tract infection    Other orders  -     azithromycin (ZITHROMAX Z-VIV) 250 MG tablet; Take 2 tablets the first day, then 1 tablet daily for 4 days.  -     promethazine-dextromethorphan (PROMETHAZINE-DM) 6.25-15 MG/5ML syrup; Take 5 mL by mouth 4 (Four) Times a Day As Needed for Cough for up to 7 days.  -     benzonatate (TESSALON PERLES) 100 MG capsule; Take 1 capsule by mouth 3 (Three) Times a Day As Needed for Cough.  -     pseudoephedrine (SUDAFED 12 HOUR) 120 MG 12 hr tablet; Take 1 tablet by mouth Every 12 (Twelve) Hours. Take prn, monitor BP,  DC if over 140/90           Discussed symptom management.Your illness appears viral at this time and will not respond to antibiotics.  Pt voices concern that illness will become bacterial and they will need an antibiotic.  Antibiotic is prescribed today with the agreement that pt will wait 3-4 days to fill it, and will begin taking it only if illness is not improving or is worse at that time. If no improvement or becomes worse, follow up with primary or go to [facility].  Pt verbalizes understanding and agrees with treatment plan.           March 30, 2018 2:08 PM

## 2018-12-19 ENCOUNTER — OFFICE VISIT (OUTPATIENT)
Dept: RETAIL CLINIC | Facility: CLINIC | Age: 53
End: 2018-12-19

## 2018-12-19 VITALS
SYSTOLIC BLOOD PRESSURE: 142 MMHG | BODY MASS INDEX: 37.1 KG/M2 | WEIGHT: 265 LBS | HEART RATE: 87 BPM | HEIGHT: 71 IN | TEMPERATURE: 98.1 F | RESPIRATION RATE: 20 BRPM | DIASTOLIC BLOOD PRESSURE: 92 MMHG | OXYGEN SATURATION: 97 %

## 2018-12-19 DIAGNOSIS — J20.9 ACUTE BRONCHITIS, UNSPECIFIED ORGANISM: Primary | ICD-10-CM

## 2018-12-19 PROCEDURE — 99213 OFFICE O/P EST LOW 20 MIN: CPT | Performed by: NURSE PRACTITIONER

## 2018-12-19 RX ORDER — AZITHROMYCIN 250 MG/1
TABLET, FILM COATED ORAL
Qty: 6 TABLET | Refills: 0 | Status: SHIPPED | OUTPATIENT
Start: 2018-12-19 | End: 2021-11-17

## 2018-12-19 RX ORDER — SILDENAFIL 100 MG/1
TABLET, FILM COATED ORAL
Refills: 3 | COMMUNITY
Start: 2018-11-29 | End: 2021-11-17 | Stop reason: SDUPTHER

## 2018-12-19 RX ORDER — ALBUTEROL SULFATE 90 UG/1
2 AEROSOL, METERED RESPIRATORY (INHALATION) EVERY 4 HOURS PRN
Qty: 1 INHALER | Refills: 0 | Status: SHIPPED | OUTPATIENT
Start: 2018-12-19 | End: 2022-11-29

## 2018-12-19 RX ORDER — LIRAGLUTIDE 6 MG/ML
INJECTION SUBCUTANEOUS
Refills: 2 | COMMUNITY
Start: 2018-12-03 | End: 2021-11-17 | Stop reason: SDUPTHER

## 2018-12-19 RX ORDER — NEBIVOLOL HYDROCHLORIDE 10 MG/1
10 TABLET ORAL DAILY
Refills: 1 | COMMUNITY
Start: 2018-12-11 | End: 2021-11-17 | Stop reason: SDUPTHER

## 2018-12-19 RX ORDER — PEN NEEDLE, DIABETIC 32GX 5/32"
NEEDLE, DISPOSABLE MISCELLANEOUS
Refills: 2 | COMMUNITY
Start: 2018-11-05 | End: 2021-11-17 | Stop reason: SDUPTHER

## 2018-12-19 RX ORDER — BENZONATATE 200 MG/1
CAPSULE ORAL
Refills: 0 | COMMUNITY
Start: 2018-12-10 | End: 2021-11-17

## 2018-12-19 RX ORDER — AMLODIPINE BESYLATE 5 MG/1
5 TABLET ORAL DAILY
Refills: 1 | COMMUNITY
Start: 2018-11-16 | End: 2021-11-17 | Stop reason: SDUPTHER

## 2018-12-19 RX ORDER — ATORVASTATIN CALCIUM 10 MG/1
10 TABLET, FILM COATED ORAL DAILY
COMMUNITY
End: 2021-11-17 | Stop reason: SDUPTHER

## 2018-12-19 RX ORDER — METHYLPREDNISOLONE 4 MG/1
TABLET ORAL
Qty: 21 TABLET | Refills: 0 | Status: SHIPPED | OUTPATIENT
Start: 2018-12-19 | End: 2021-11-17

## 2018-12-19 NOTE — PROGRESS NOTES
Subjective   Amrita Buckley II is a 53 y.o. male.   Chief Complaint   Patient presents with   • Cough      52 yo w/m presents with complaint of nonproductive cough duration of 3-4 weeks, he reports taking azithromycin and felt like he was getting better and now his cough is worse, he was using albuterol inhaler which seemed to help, but he has used it all,  he is limited as to medications due to employed as a .  Refer to HPI/ROS for additional information.      Cough   This is a new problem. The current episode started 1 to 4 weeks ago. The problem has been waxing and waning. The cough is non-productive. Pertinent negatives include no fever, shortness of breath or wheezing. The symptoms are aggravated by lying down. He has tried OTC cough suppressant, a beta-agonist inhaler and body position changes for the symptoms. The treatment provided significant relief. His past medical history is significant for bronchitis.        The following portions of the patient's history were reviewed and updated as appropriate: allergies, current medications, past family history, past medical history, past social history, past surgical history and problem list.    Current Outpatient Medications:   •  amLODIPine (NORVASC) 5 MG tablet, Take 5 mg by mouth Daily., Disp: , Rfl: 1  •  atorvastatin (LIPITOR) 10 MG tablet, Take 10 mg by mouth Daily., Disp: , Rfl:   •  BD PEN NEEDLE CAMRYN U/F 32G X 4 MM misc, USE ONCE DAILY WITH VICTOZA, Disp: , Rfl: 2  •  benzonatate (TESSALON PERLES) 100 MG capsule, Take 1 capsule by mouth 3 (Three) Times a Day As Needed for Cough., Disp: 60 capsule, Rfl: 0  •  Blood Glucose Monitoring Suppl (JASON BREEZE 2 SYSTEM) W/DEVICE kit, , Disp: , Rfl:   •  BYSTOLIC 5 MG tablet, Take 5 mg by mouth Daily., Disp: , Rfl: 1  •  metFORMIN (GLUCOPHAGE) 1000 MG tablet, Take 1,000 mg by mouth 2 (Two) Times a Day With Meals., Disp: , Rfl:   •  Nebivolol HCl (BYSTOLIC PO), Take  by mouth., Disp: , Rfl:   •  ONE TOUCH ULTRA  "TEST test strip, USE ONE STRIP TO TEST THREE TIMES A DAY, Disp: 100 each, Rfl: 2  •  sildenafil (VIAGRA) 100 MG tablet, TAKE 1/2-1 TABLET BY MOUTH A DAY AS NEEDED., Disp: , Rfl: 3  •  VICTOZA 18 MG/3ML solution pen-injector injection, INJECT 1.8 MG SUBCUTANEOUSLY IN THE MORNING EVERY DAY, Disp: , Rfl: 2  •  albuterol sulfate  (90 Base) MCG/ACT inhaler, Inhale 2 puffs Every 4 (Four) Hours As Needed for Wheezing., Disp: 1 inhaler, Rfl: 0  •  AMLODIPINE BESYLATE PO, Take  by mouth., Disp: , Rfl:   •  azithromycin (ZITHROMAX Z-VIV) 250 MG tablet, Take 2 tablets the first day, then 1 tablet daily for 4 days., Disp: 6 tablet, Rfl: 0  •  benzonatate (TESSALON PERLES) 100 MG capsule, Take 1 capsule by mouth 3 (Three) Times a Day As Needed for Cough., Disp: 30 capsule, Rfl: 0  •  benzonatate (TESSALON) 200 MG capsule, TAKE ONE CAPSULE BY MOUTH 3 TIMES A DAY AS NEEDED FOR COUGH, Disp: , Rfl: 0  •  linagliptin (TRADJENTA) 5 MG tablet tablet, Take 5 mg by mouth Daily., Disp: , Rfl:   •  MethylPREDNISolone (MEDROL, VIV,) 4 MG tablet, Take as directed on package instructions., Disp: 21 tablet, Rfl: 0  •  pseudoephedrine (SUDAFED 12 HOUR) 120 MG 12 hr tablet, Take 1 tablet by mouth Every 12 (Twelve) Hours. Take prn, monitor BP,  DC if over 140/90, Disp: 30 tablet, Rfl: 0    Review of Systems   Constitutional: Positive for activity change. Negative for appetite change and fever.   HENT: Negative.    Eyes: Negative.    Respiratory: Positive for cough. Negative for apnea, choking, chest tightness, shortness of breath, wheezing and stridor.    Cardiovascular: Negative.    Gastrointestinal: Negative.    Skin: Negative.    Psychiatric/Behavioral: Negative.      /92   Pulse 87   Temp 98.1 °F (36.7 °C) (Oral)   Resp 20   Ht 180.3 cm (71\")   Wt 120 kg (265 lb)   SpO2 97%   BMI 36.96 kg/m²     Objective   No Known Allergies    Physical Exam   Constitutional: He is oriented to person, place, and time. He appears " well-developed and well-nourished. No distress.   HENT:   Head: Normocephalic.   Right Ear: External ear normal.   Left Ear: External ear normal.   Mouth/Throat: Oropharynx is clear and moist.   Eyes: Conjunctivae are normal.   Neck: Normal range of motion. Neck supple.   Cardiovascular: Normal rate, regular rhythm and normal heart sounds.   Pulmonary/Chest: Effort normal and breath sounds normal. No stridor. No respiratory distress. He has no wheezes. He has no rales. He exhibits no tenderness.   Neurological: He is alert and oriented to person, place, and time.   Skin: Skin is warm. Capillary refill takes less than 2 seconds. He is not diaphoretic.   Psychiatric: He has a normal mood and affect. His behavior is normal. Judgment and thought content normal.   Vitals reviewed.      Assessment/Plan   Amrita was seen today for cough.    Diagnoses and all orders for this visit:    Acute bronchitis, unspecified organism    Other orders  -     albuterol sulfate  (90 Base) MCG/ACT inhaler; Inhale 2 puffs Every 4 (Four) Hours As Needed for Wheezing.  -     azithromycin (ZITHROMAX Z-VIV) 250 MG tablet; Take 2 tablets the first day, then 1 tablet daily for 4 days.  -     MethylPREDNISolone (MEDROL, VIV,) 4 MG tablet; Take as directed on package instructions.                      An After Visit Summary was printed, reviewed, and given to the patient. Understanding verbalized and agrees with treatment plan.  If no improvement or becomes worse, follow up with primary or go to [facility].     December 19, 2018 12:12 PM

## 2018-12-21 ENCOUNTER — TELEPHONE (OUTPATIENT)
Dept: RETAIL CLINIC | Facility: CLINIC | Age: 53
End: 2018-12-21

## 2018-12-21 RX ORDER — DEXTROMETHORPHAN HYDROBROMIDE AND PROMETHAZINE HYDROCHLORIDE 15; 6.25 MG/5ML; MG/5ML
5 SYRUP ORAL 3 TIMES DAILY PRN
Qty: 118 ML | Refills: 0 | Status: SHIPPED | OUTPATIENT
Start: 2018-12-21 | End: 2021-11-17

## 2018-12-21 NOTE — TELEPHONE ENCOUNTER
----- Message from Ramona De Paz sent at 12/21/2018  5:00 PM EST -----  Contact: NICOLE JACKSON  REQUESTING STRONGER COUGH MEDICINE. HE SPOKE WITH RACHEL ABOUT IT AND SHE SAID IF HE NEEDED ONE TO CALL BACK IN. HE CALLED AT 5:01 PM   CALL BACK NUMBER: [phone]    Spoke to pt by phone. He was seen 2 days ago per YUSRA Brenner who reportedly offered the patient promethazine DM. He was reluctant initially to accept, however cough not controlled with OTC cough meds and tessalon and he has changed his mind. RX promethazine sent to pharmacy

## 2021-11-17 ENCOUNTER — LAB (OUTPATIENT)
Dept: LAB | Facility: HOSPITAL | Age: 56
End: 2021-11-17

## 2021-11-17 ENCOUNTER — OFFICE VISIT (OUTPATIENT)
Dept: INTERNAL MEDICINE | Facility: CLINIC | Age: 56
End: 2021-11-17

## 2021-11-17 ENCOUNTER — TELEPHONE (OUTPATIENT)
Dept: INTERNAL MEDICINE | Facility: CLINIC | Age: 56
End: 2021-11-17

## 2021-11-17 VITALS
HEIGHT: 71 IN | TEMPERATURE: 97.8 F | DIASTOLIC BLOOD PRESSURE: 96 MMHG | OXYGEN SATURATION: 98 % | HEART RATE: 67 BPM | BODY MASS INDEX: 37.38 KG/M2 | WEIGHT: 267 LBS | SYSTOLIC BLOOD PRESSURE: 154 MMHG

## 2021-11-17 DIAGNOSIS — E11.65 TYPE 2 DIABETES MELLITUS WITH HYPERGLYCEMIA, WITHOUT LONG-TERM CURRENT USE OF INSULIN (HCC): Primary | ICD-10-CM

## 2021-11-17 DIAGNOSIS — E78.2 MIXED HYPERLIPIDEMIA: ICD-10-CM

## 2021-11-17 DIAGNOSIS — Z11.59 ENCOUNTER FOR HEPATITIS C SCREENING TEST FOR LOW RISK PATIENT: ICD-10-CM

## 2021-11-17 DIAGNOSIS — I10 ESSENTIAL HYPERTENSION: ICD-10-CM

## 2021-11-17 DIAGNOSIS — E11.65 TYPE 2 DIABETES MELLITUS WITH HYPERGLYCEMIA, WITHOUT LONG-TERM CURRENT USE OF INSULIN (HCC): ICD-10-CM

## 2021-11-17 DIAGNOSIS — Z23 NEED FOR INFLUENZA VACCINATION: ICD-10-CM

## 2021-11-17 DIAGNOSIS — Z12.5 SCREENING FOR PROSTATE CANCER: ICD-10-CM

## 2021-11-17 DIAGNOSIS — N52.9 MALE ERECTILE DISORDER: ICD-10-CM

## 2021-11-17 LAB
BASOPHILS # BLD AUTO: 0.04 10*3/MM3 (ref 0–0.2)
BASOPHILS NFR BLD AUTO: 0.7 % (ref 0–1.5)
DEPRECATED RDW RBC AUTO: 45 FL (ref 37–54)
EOSINOPHIL # BLD AUTO: 0.11 10*3/MM3 (ref 0–0.4)
EOSINOPHIL NFR BLD AUTO: 2 % (ref 0.3–6.2)
ERYTHROCYTE [DISTWIDTH] IN BLOOD BY AUTOMATED COUNT: 13.7 % (ref 12.3–15.4)
HBA1C MFR BLD: 13.09 % (ref 4.8–5.6)
HCT VFR BLD AUTO: 45.4 % (ref 37.5–51)
HGB BLD-MCNC: 14.1 G/DL (ref 13–17.7)
IMM GRANULOCYTES # BLD AUTO: 0.02 10*3/MM3 (ref 0–0.05)
IMM GRANULOCYTES NFR BLD AUTO: 0.4 % (ref 0–0.5)
LYMPHOCYTES # BLD AUTO: 1.31 10*3/MM3 (ref 0.7–3.1)
LYMPHOCYTES NFR BLD AUTO: 24.1 % (ref 19.6–45.3)
MCH RBC QN AUTO: 28.1 PG (ref 26.6–33)
MCHC RBC AUTO-ENTMCNC: 31.1 G/DL (ref 31.5–35.7)
MCV RBC AUTO: 90.4 FL (ref 79–97)
MONOCYTES # BLD AUTO: 0.41 10*3/MM3 (ref 0.1–0.9)
MONOCYTES NFR BLD AUTO: 7.6 % (ref 5–12)
NEUTROPHILS NFR BLD AUTO: 3.54 10*3/MM3 (ref 1.7–7)
NEUTROPHILS NFR BLD AUTO: 65.2 % (ref 42.7–76)
NRBC BLD AUTO-RTO: 0 /100 WBC (ref 0–0.2)
PLATELET # BLD AUTO: 270 10*3/MM3 (ref 140–450)
PMV BLD AUTO: 11.5 FL (ref 6–12)
RBC # BLD AUTO: 5.02 10*6/MM3 (ref 4.14–5.8)
WBC NRBC COR # BLD: 5.43 10*3/MM3 (ref 3.4–10.8)

## 2021-11-17 PROCEDURE — 36415 COLL VENOUS BLD VENIPUNCTURE: CPT | Performed by: NURSE PRACTITIONER

## 2021-11-17 PROCEDURE — 86803 HEPATITIS C AB TEST: CPT | Performed by: NURSE PRACTITIONER

## 2021-11-17 PROCEDURE — 85025 COMPLETE CBC W/AUTO DIFF WBC: CPT

## 2021-11-17 PROCEDURE — 83036 HEMOGLOBIN GLYCOSYLATED A1C: CPT

## 2021-11-17 PROCEDURE — 99214 OFFICE O/P EST MOD 30 MIN: CPT | Performed by: NURSE PRACTITIONER

## 2021-11-17 PROCEDURE — 80053 COMPREHEN METABOLIC PANEL: CPT | Performed by: NURSE PRACTITIONER

## 2021-11-17 PROCEDURE — 90686 IIV4 VACC NO PRSV 0.5 ML IM: CPT | Performed by: NURSE PRACTITIONER

## 2021-11-17 PROCEDURE — G0103 PSA SCREENING: HCPCS

## 2021-11-17 PROCEDURE — 90471 IMMUNIZATION ADMIN: CPT | Performed by: NURSE PRACTITIONER

## 2021-11-17 RX ORDER — AMLODIPINE BESYLATE 5 MG/1
5 TABLET ORAL DAILY
Qty: 90 TABLET | Refills: 1 | Status: SHIPPED | OUTPATIENT
Start: 2021-11-17 | End: 2022-06-02

## 2021-11-17 RX ORDER — ATORVASTATIN CALCIUM 10 MG/1
10 TABLET, FILM COATED ORAL DAILY
Qty: 90 TABLET | Refills: 1 | Status: SHIPPED | OUTPATIENT
Start: 2021-11-17 | End: 2022-06-02

## 2021-11-17 RX ORDER — PEN NEEDLE, DIABETIC 32GX 5/32"
NEEDLE, DISPOSABLE MISCELLANEOUS
Qty: 90 EACH | Refills: 1 | Status: SHIPPED | OUTPATIENT
Start: 2021-11-17 | End: 2022-09-27

## 2021-11-17 RX ORDER — SILDENAFIL 100 MG/1
TABLET, FILM COATED ORAL
Qty: 30 TABLET | Refills: 3 | Status: SHIPPED | OUTPATIENT
Start: 2021-11-17

## 2021-11-17 RX ORDER — LIRAGLUTIDE 6 MG/ML
1.8 INJECTION SUBCUTANEOUS DAILY
Qty: 9 PEN | Refills: 1 | Status: SHIPPED | OUTPATIENT
Start: 2021-11-17 | End: 2022-08-26

## 2021-11-17 RX ORDER — NEBIVOLOL HYDROCHLORIDE 10 MG/1
10 TABLET ORAL DAILY
Qty: 90 TABLET | Refills: 1 | Status: SHIPPED | OUTPATIENT
Start: 2021-11-17 | End: 2021-11-18

## 2021-11-17 NOTE — PROGRESS NOTES
Subjective   Chief Complaint   Patient presents with   • Establish Care     medication refills        Amrita Buckley II is a 56 y.o. male here today as a new patient to establish care.  Previous PCP was Dr. Cortez at [facility].  They no longer accept his insurance.  Pt has Type 2 diabetes.  He was diagnosed about 10 years ago.  Hasn't had his Victoza in several months.  Just recently started following a diabetic diet.  Fasting readings around 220 in the am.  His last A1c was 8 about a year ago. Needing refills.  Has been out of Amlodipine.  Uses prn inhaler for seasonal allergies that turns into bronchitis    Review of Systems   Constitutional: Negative for activity change, appetite change and fatigue.   HENT: Negative for congestion.    Respiratory: Negative for cough and shortness of breath.    Cardiovascular: Negative for chest pain and leg swelling.   Gastrointestinal: Negative for abdominal pain.   Neurological: Negative for dizziness, weakness and confusion.   Psychiatric/Behavioral: Negative for behavioral problems and decreased concentration.       Past Medical History:   Diagnosis Date   • Bronchitis    • Elevated cholesterol    • Hypertension    • Type 2 diabetes mellitus (HCC)      Past Surgical History:   Procedure Laterality Date   • ROTATOR CUFF REPAIR Left      Family History   Problem Relation Age of Onset   • Lung disease Mother    • Heart disease Mother    • Diabetes Father      Social History     Tobacco Use   Smoking Status Never Smoker   Smokeless Tobacco Never Used      Social History     Substance and Sexual Activity   Alcohol Use Not Currently      Current Outpatient Medications on File Prior to Visit   Medication Sig   • glucose blood test strip 1 each by Other route As Needed. Use as instructed   • albuterol sulfate  (90 Base) MCG/ACT inhaler Inhale 2 puffs Every 4 (Four) Hours As Needed for Wheezing.   • Blood Glucose Monitoring Suppl (JASON BREEZE 2 SYSTEM) W/DEVICE kit     • [DISCONTINUED] amLODIPine (NORVASC) 5 MG tablet Take 5 mg by mouth Daily.   • [DISCONTINUED] AMLODIPINE BESYLATE PO Take  by mouth.   • [DISCONTINUED] atorvastatin (LIPITOR) 10 MG tablet Take 10 mg by mouth Daily.   • [DISCONTINUED] azithromycin (ZITHROMAX Z-VIV) 250 MG tablet Take 2 tablets the first day, then 1 tablet daily for 4 days.   • [DISCONTINUED] BD PEN NEEDLE CAMRYN U/F 32G X 4 MM misc USE ONCE DAILY WITH VICTOZA   • [DISCONTINUED] benzonatate (TESSALON PERLES) 100 MG capsule Take 1 capsule by mouth 3 (Three) Times a Day As Needed for Cough.   • [DISCONTINUED] benzonatate (TESSALON PERLES) 100 MG capsule Take 1 capsule by mouth 3 (Three) Times a Day As Needed for Cough.   • [DISCONTINUED] benzonatate (TESSALON) 200 MG capsule TAKE ONE CAPSULE BY MOUTH 3 TIMES A DAY AS NEEDED FOR COUGH   • [DISCONTINUED] Bystolic 10 MG tablet Take 10 mg by mouth Daily.   • [DISCONTINUED] linagliptin (TRADJENTA) 5 MG tablet tablet Take 5 mg by mouth Daily.   • [DISCONTINUED] metFORMIN (GLUCOPHAGE) 1000 MG tablet Take 1,000 mg by mouth 2 (Two) Times a Day With Meals.   • [DISCONTINUED] MethylPREDNISolone (MEDROL, VIV,) 4 MG tablet Take as directed on package instructions.   • [DISCONTINUED] Nebivolol HCl (BYSTOLIC PO) Take  by mouth.   • [DISCONTINUED] ONE TOUCH ULTRA TEST test strip USE ONE STRIP TO TEST THREE TIMES A DAY   • [DISCONTINUED] promethazine-dextromethorphan (PROMETHAZINE-DM) 6.25-15 MG/5ML syrup Take 5 mL by mouth 3 (Three) Times a Day As Needed for Cough.   • [DISCONTINUED] pseudoephedrine (SUDAFED 12 HOUR) 120 MG 12 hr tablet Take 1 tablet by mouth Every 12 (Twelve) Hours. Take prn, monitor BP,  DC if over 140/90   • [DISCONTINUED] sildenafil (VIAGRA) 100 MG tablet TAKE 1/2-1 TABLET BY MOUTH A DAY AS NEEDED.   • [DISCONTINUED] VICTOZA 18 MG/3ML solution pen-injector injection INJECT 1.8 MG SUBCUTANEOUSLY IN THE MORNING EVERY DAY     No current facility-administered medications on file prior to visit.     No  "Known Allergies    Objective   Vitals:    11/17/21 1329   BP: 154/96   Pulse: 67   Temp: 97.8 °F (36.6 °C)   TempSrc: Temporal   SpO2: 98%   Weight: 121 kg (267 lb)   Height: 180.3 cm (71\")     Body mass index is 37.24 kg/m².    Physical Exam  Vitals and nursing note reviewed.   Constitutional:       Appearance: He is obese.   HENT:      Head: Normocephalic.   Eyes:      Pupils: Pupils are equal, round, and reactive to light.   Cardiovascular:      Rate and Rhythm: Normal rate and regular rhythm.      Pulses: Normal pulses.      Heart sounds: Normal heart sounds.   Pulmonary:      Effort: Pulmonary effort is normal.      Breath sounds: Normal breath sounds.   Skin:     General: Skin is warm and dry.      Capillary Refill: Capillary refill takes less than 2 seconds.   Neurological:      General: No focal deficit present.      Mental Status: He is alert and oriented to person, place, and time.      Gait: Gait is intact.   Psychiatric:         Attention and Perception: Attention normal.         Mood and Affect: Mood normal.         Behavior: Behavior normal.         Assessment/Plan   Problem List Items Addressed This Visit     None      Visit Diagnoses     Type 2 diabetes mellitus with hyperglycemia, without long-term current use of insulin (HCC)    -  Primary    Relevant Medications    glucose blood test strip    Victoza 18 MG/3ML solution pen-injector injection    BD Pen Needle Vidhya U/F 32G X 4 MM misc    metFORMIN (GLUCOPHAGE) 1000 MG tablet    Other Relevant Orders    CBC w AUTO Differential    Comprehensive Metabolic Panel    Hemoglobin A1c    MicroAlbumin, Urine, Random - Urine, Clean Catch    Need for influenza vaccination        Relevant Orders    FluLaval/Fluarix/Fluzone >6 Months (3676-8343) (Completed)    Essential hypertension        Relevant Medications    Bystolic 10 MG tablet    amLODIPine (NORVASC) 5 MG tablet    Mixed hyperlipidemia        Relevant Medications    atorvastatin (LIPITOR) 10 MG tablet    " Screening for prostate cancer        Relevant Orders    PSA Screen    Encounter for hepatitis C screening test for low risk patient        Relevant Orders    Hepatitis C Antibody    Male erectile disorder        Relevant Medications    sildenafil (VIAGRA) 100 MG tablet         Labs today  meds refilled  BP high - has been out of Amlodipine for a while, refilled  Schedule physical    Current Outpatient Medications:   •  glucose blood test strip, 1 each by Other route As Needed. Use as instructed, Disp: , Rfl:   •  albuterol sulfate  (90 Base) MCG/ACT inhaler, Inhale 2 puffs Every 4 (Four) Hours As Needed for Wheezing., Disp: 1 inhaler, Rfl: 0  •  amLODIPine (NORVASC) 5 MG tablet, Take 1 tablet by mouth Daily., Disp: 90 tablet, Rfl: 1  •  atorvastatin (LIPITOR) 10 MG tablet, Take 1 tablet by mouth Daily., Disp: 90 tablet, Rfl: 1  •  BD Pen Needle Vidhya U/F 32G X 4 MM misc, Use once daily with Victoza, Disp: 90 each, Rfl: 1  •  Blood Glucose Monitoring Suppl (OppaEZE 2 SYSTEM) W/DEVICE kit, , Disp: , Rfl:   •  Bystolic 10 MG tablet, Take 1 tablet by mouth Daily., Disp: 90 tablet, Rfl: 1  •  metFORMIN (GLUCOPHAGE) 1000 MG tablet, Take 1 tablet by mouth 2 (Two) Times a Day With Meals., Disp: 180 tablet, Rfl: 1  •  sildenafil (VIAGRA) 100 MG tablet, Take 1/2-1 tablet as needed 30 minutes prior to sexual activity, Disp: 30 tablet, Rfl: 3  •  Victoza 18 MG/3ML solution pen-injector injection, Inject 1.8 mg under the skin into the appropriate area as directed Daily., Disp: 9 pen, Rfl: 1       Plan of care reviewed with the patient at the conclusion of today's visit.  Education was provided regarding diagnosis, management, and any prescribed or recommended OTC medications.  Patient verbalized understanding of and agreement with management plan.     Return in about 4 weeks (around 12/15/2021) for Annual.         YUSRA Armenta

## 2021-11-17 NOTE — TELEPHONE ENCOUNTER
Caller: Amrita Buckley II    Relationship: Self    Best call back number: [phone]    What medication are you requesting: GENERIC FOR  Bystolic 10 MG tablet [[id number]] (Order [id number])    What are your current symptoms: NA    How long have you been experiencing symptoms: NA    Have you had these symptoms before:    [x] Yes  [x] No    Have you been treated for these symptoms before:   [x] Yes  [x] No    If a prescription is needed, what is your preferred pharmacy and phone number:      [id number] [place] [address] - [phone]  - [phone]   [phone]    Additional notes:  PATIENT STATED HIS INSURANCE WILL COVER GENERIC BRAND FOR THE BYSTOLIC.

## 2021-11-18 DIAGNOSIS — N28.9 ABNORMAL KIDNEY FUNCTION: Primary | ICD-10-CM

## 2021-11-18 DIAGNOSIS — I10 ESSENTIAL HYPERTENSION: Primary | ICD-10-CM

## 2021-11-18 LAB
ALBUMIN SERPL-MCNC: 3.6 G/DL (ref 3.5–5.2)
ALBUMIN/GLOB SERPL: 1.3 G/DL
ALP SERPL-CCNC: 88 U/L (ref 39–117)
ALT SERPL W P-5'-P-CCNC: 12 U/L (ref 1–41)
ANION GAP SERPL CALCULATED.3IONS-SCNC: 9.2 MMOL/L (ref 5–15)
AST SERPL-CCNC: 14 U/L (ref 1–40)
BILIRUB SERPL-MCNC: 0.3 MG/DL (ref 0–1.2)
BUN SERPL-MCNC: 21 MG/DL (ref 6–20)
BUN/CREAT SERPL: 13.1 (ref 7–25)
CALCIUM SPEC-SCNC: 9.2 MG/DL (ref 8.6–10.5)
CHLORIDE SERPL-SCNC: 99 MMOL/L (ref 98–107)
CO2 SERPL-SCNC: 24.8 MMOL/L (ref 22–29)
CREAT SERPL-MCNC: 1.6 MG/DL (ref 0.76–1.27)
GFR SERPL CREATININE-BSD FRML MDRD: 45 ML/MIN/1.73
GLOBULIN UR ELPH-MCNC: 2.8 GM/DL
GLUCOSE SERPL-MCNC: 297 MG/DL (ref 65–99)
HCV AB SER DONR QL: NORMAL
POTASSIUM SERPL-SCNC: 5.2 MMOL/L (ref 3.5–5.2)
PROT SERPL-MCNC: 6.4 G/DL (ref 6–8.5)
PSA SERPL-MCNC: 0.36 NG/ML (ref 0–4)
SODIUM SERPL-SCNC: 133 MMOL/L (ref 136–145)

## 2021-11-18 RX ORDER — NEBIVOLOL 10 MG/1
10 TABLET ORAL DAILY
Qty: 90 TABLET | Refills: 1 | Status: SHIPPED | OUTPATIENT
Start: 2021-11-18 | End: 2021-12-03

## 2021-11-19 ENCOUNTER — PATIENT ROUNDING (BHMG ONLY) (OUTPATIENT)
Dept: INTERNAL MEDICINE | Facility: CLINIC | Age: 56
End: 2021-11-19

## 2021-11-19 NOTE — PROGRESS NOTES
November 19, 2021    Hello, may I speak with Amrita Buckley II?    My name is Parul.      I am  with [facility]  [address] [place] [address] [place] [id number] [phone].    Before we get started may I verify your date of birth? 1965    I am calling to officially welcome you to our practice and ask about your recent visit. Is this a good time to talk? yes    Tell me about your visit with us. What things went well?  Everything was fine.       We're always looking for ways to make our patients' experiences even better. Do you have recommendations on ways we may improve?  no    Overall were you satisfied with your first visit to our practice? yes       I appreciate you taking the time to speak with me today. Is there anything else I can do for you? no      Thank you, and have a great day.

## 2021-11-22 ENCOUNTER — TELEPHONE (OUTPATIENT)
Dept: INTERNAL MEDICINE | Facility: CLINIC | Age: 56
End: 2021-11-22

## 2021-11-22 NOTE — TELEPHONE ENCOUNTER
Caller: SeeAmrita II    Relationship: Self    Best call back number: [phone]   What is the best time to reach you: ANYTIME     Who are you requesting to speak with (clinical staff, provider,  specific staff member): CLINICAL     Do you know the name of the person who called: THE PATIENT GARRED    What was the call regarding:THE PATIENT REPORTS THAT DOSAGE NEEDS TO BE INCREASED FROM 1 TABLET DAILY TO 1 TO 2 TABLETS DAILY AS NEEDED FOR THE BYSTOLIC. HE STATES THIS IS HOW HIS PREVIOUS DOCTOR WROTE THE PRESCRIPTION.     Do you require a callback: YES, PLEASE CALL AND ADVISE -[phone]

## 2021-11-22 NOTE — TELEPHONE ENCOUNTER
Caller: SeeAmrita II    Relationship: Self    Best call back number:[phone]     What is the best time to reach you: ANYTIME    Who are you requesting to speak with (clinical staff, provider,  specific staff member): CLINICAL     Do you know the name of the person who called: AMRITA  What was the call regarding: PRIOR AUTHORIZATION REQUIRED FOR THE PATIENT'S BYSTOLIC. THE PATIENT WILL BE OUT OF MEDICATION AFTER TODAY, 11/22/2021.    Do you require a callback:YES, PLEASE CALL AND ADVISE -[phone]

## 2021-12-02 ENCOUNTER — TELEPHONE (OUTPATIENT)
Dept: INTERNAL MEDICINE | Facility: CLINIC | Age: 56
End: 2021-12-02

## 2021-12-02 DIAGNOSIS — E11.65 TYPE 2 DIABETES MELLITUS WITH HYPERGLYCEMIA, WITHOUT LONG-TERM CURRENT USE OF INSULIN (HCC): ICD-10-CM

## 2021-12-02 NOTE — TELEPHONE ENCOUNTER
Caller: SeeAmrita II    Relationship: Self    Best call back number: [phone]    Who are you requesting to speak with (clinical staff, provider,  specific staff member): CLINICAL STAFF    What was the call regarding: PATIENT IS FOLLOWING UP ON HIS PRIOR AUTHORIZATION FOR nebivolol (BYSTOLIC) 10 MG tablet    Do you require a callback: YES    ADDITIONAL REQUEST: PATIENT REQUESTED A REFILL OF glucose blood test strip    [id number] - [place] [address] AT [address] - [phone]  - [phone] FX

## 2021-12-03 ENCOUNTER — TELEPHONE (OUTPATIENT)
Dept: INTERNAL MEDICINE | Facility: CLINIC | Age: 56
End: 2021-12-03

## 2021-12-03 DIAGNOSIS — I10 ESSENTIAL HYPERTENSION: Primary | ICD-10-CM

## 2021-12-03 RX ORDER — METOPROLOL SUCCINATE 25 MG/1
25 TABLET, EXTENDED RELEASE ORAL DAILY
Qty: 90 TABLET | Refills: 1 | Status: SHIPPED | OUTPATIENT
Start: 2021-12-03 | End: 2022-06-02

## 2021-12-03 NOTE — TELEPHONE ENCOUNTER
Pharmacy Name: [facility] #[id number] - [facility] [address] - [phone]  - [phone] FX       Pharmacy representative phone number: [phone]    What medication are you calling in regards to: glucose blood test strip    What question does the pharmacy have: HOW OFTEN THE PATIENT NEEDS TO TEST     Additional notes:  PLEASE CALL

## 2021-12-03 NOTE — TELEPHONE ENCOUNTER
PA has been denied for both generic and name brand, I spoke with pt yesterday, he is willing to switch to a similar medication

## 2021-12-15 ENCOUNTER — TELEPHONE (OUTPATIENT)
Dept: INTERNAL MEDICINE | Facility: CLINIC | Age: 56
End: 2021-12-15

## 2021-12-15 RX ORDER — DIPHENHYDRAMINE HYDROCHLORIDE 25 MG/1
1 CAPSULE, LIQUID FILLED ORAL 3 TIMES DAILY
Qty: 1 EACH | Refills: 0 | Status: SHIPPED | OUTPATIENT
Start: 2021-12-15

## 2021-12-15 NOTE — TELEPHONE ENCOUNTER
Caller: [facility] #[id number] - [facility] [address] - [phone]  - [phone] ISAURA OLIVAREZ    Relationship: Pharmacy    Best call back number: [phone]    Equipment requested: ONE TOUCH ULTRA GLUCOSE MONITOR, LANCETS    Reason for the request:   PATIENT WILL NEED A GLUCOSE MONITORING DEVIOCE TO CHECK HIS BLOOD GLUCOSE LEVELS DAILY AS RECOMMENDED.    Prescribing Provider: YUSRA MOSS    Additional information or concerns:   PHARMACY RECEIVED A PRESCRIPTION FOR THE PATIENT TO RECEIVE THE TEST STRIPS FOR THE REQUESTED GLUCOSE MONITOR, PATIENT DOES NOT HAVE A MONITOR TO USE THEM WITH AT THIS TIME.      PATIENT HAS BEEN ADVISED THAT THIS REQUEST HAS BEEN MARKED AS A HIGH PRIORITY, PLEASE ALLOW 48 HOURS FOR OUR CLINICAL TEAM TO FOLLOW UP ON THIS REQUEST.

## 2022-06-02 DIAGNOSIS — I10 ESSENTIAL HYPERTENSION: ICD-10-CM

## 2022-06-02 DIAGNOSIS — E78.2 MIXED HYPERLIPIDEMIA: ICD-10-CM

## 2022-06-02 RX ORDER — METOPROLOL SUCCINATE 25 MG/1
25 TABLET, EXTENDED RELEASE ORAL DAILY
Qty: 90 TABLET | Refills: 1 | Status: SHIPPED | OUTPATIENT
Start: 2022-06-02

## 2022-06-02 RX ORDER — ATORVASTATIN CALCIUM 10 MG/1
10 TABLET, FILM COATED ORAL DAILY
Qty: 90 TABLET | Refills: 1 | Status: SHIPPED | OUTPATIENT
Start: 2022-06-02 | End: 2022-09-27

## 2022-06-02 RX ORDER — AMLODIPINE BESYLATE 5 MG/1
5 TABLET ORAL DAILY
Qty: 90 TABLET | Refills: 1 | Status: SHIPPED | OUTPATIENT
Start: 2022-06-02 | End: 2022-09-27

## 2022-06-03 DIAGNOSIS — E11.65 TYPE 2 DIABETES MELLITUS WITH HYPERGLYCEMIA, WITHOUT LONG-TERM CURRENT USE OF INSULIN: ICD-10-CM

## 2022-06-29 DIAGNOSIS — E11.65 TYPE 2 DIABETES MELLITUS WITH HYPERGLYCEMIA, WITHOUT LONG-TERM CURRENT USE OF INSULIN: ICD-10-CM

## 2022-07-29 DIAGNOSIS — E11.65 TYPE 2 DIABETES MELLITUS WITH HYPERGLYCEMIA, WITHOUT LONG-TERM CURRENT USE OF INSULIN: ICD-10-CM

## 2022-08-05 ENCOUNTER — TELEPHONE (OUTPATIENT)
Dept: INTERNAL MEDICINE | Facility: CLINIC | Age: 57
End: 2022-08-05

## 2022-08-05 NOTE — TELEPHONE ENCOUNTER
Caller: SeeAmrita II    Relationship: Self    Best call back number: [phone] A DETAILED VOICEMAIL IS ACCEPTABLE TO LEAVE.    What is the best time to reach you: ANY    Who are you requesting to speak with (clinical staff, provider,  specific staff member): CLINICAL    What was the call regarding: PATIENT WOULD LIKE A CALL BACK TO HELP SETUP A KIDNEY SCAN THAT HE CAN COMPLETE ON A Saturday, IF POSSIBLE. PATIENT WOULD ALSO LIKE TO KNOW IF HE NEEDS TO SCHEDULE A MYCHART VISIT TO CONTINUE HAVING HIS METFORMIN PRESCRIBED.    Do you require a callback: YES, PLEASE CALL BACK TO ADVISE.    THANK YOU.

## 2022-08-26 DIAGNOSIS — E11.65 TYPE 2 DIABETES MELLITUS WITH HYPERGLYCEMIA, WITHOUT LONG-TERM CURRENT USE OF INSULIN: ICD-10-CM

## 2022-08-26 RX ORDER — LIRAGLUTIDE 6 MG/ML
INJECTION SUBCUTANEOUS
Qty: 27 ML | Refills: 0 | Status: SHIPPED | OUTPATIENT
Start: 2022-08-26

## 2022-09-27 DIAGNOSIS — E78.2 MIXED HYPERLIPIDEMIA: ICD-10-CM

## 2022-09-27 DIAGNOSIS — I10 ESSENTIAL HYPERTENSION: ICD-10-CM

## 2022-09-27 DIAGNOSIS — E11.65 TYPE 2 DIABETES MELLITUS WITH HYPERGLYCEMIA, WITHOUT LONG-TERM CURRENT USE OF INSULIN: ICD-10-CM

## 2022-09-27 RX ORDER — PEN NEEDLE, DIABETIC 32GX 5/32"
NEEDLE, DISPOSABLE MISCELLANEOUS
Qty: 50 EACH | Refills: 0 | Status: SHIPPED | OUTPATIENT
Start: 2022-09-27

## 2022-09-27 RX ORDER — ATORVASTATIN CALCIUM 10 MG/1
10 TABLET, FILM COATED ORAL DAILY
Qty: 30 TABLET | Refills: 1 | Status: SHIPPED | OUTPATIENT
Start: 2022-09-27

## 2022-09-27 RX ORDER — AMLODIPINE BESYLATE 5 MG/1
5 TABLET ORAL DAILY
Qty: 30 TABLET | Refills: 1 | Status: SHIPPED | OUTPATIENT
Start: 2022-09-27

## 2022-11-29 ENCOUNTER — TELEMEDICINE (OUTPATIENT)
Dept: FAMILY MEDICINE CLINIC | Facility: TELEHEALTH | Age: 57
End: 2022-11-29

## 2022-11-29 DIAGNOSIS — J20.9 ACUTE BRONCHITIS, UNSPECIFIED ORGANISM: Primary | ICD-10-CM

## 2022-11-29 PROCEDURE — 99213 OFFICE O/P EST LOW 20 MIN: CPT | Performed by: NURSE PRACTITIONER

## 2022-11-29 RX ORDER — AZITHROMYCIN 250 MG/1
TABLET, FILM COATED ORAL
Qty: 6 TABLET | Refills: 0 | Status: SHIPPED | OUTPATIENT
Start: 2022-11-29

## 2022-11-29 RX ORDER — ALBUTEROL SULFATE 90 UG/1
2 AEROSOL, METERED RESPIRATORY (INHALATION) EVERY 4 HOURS PRN
Qty: 6.7 G | Refills: 0 | Status: SHIPPED | OUTPATIENT
Start: 2022-11-29

## 2022-11-29 RX ORDER — BLOOD SUGAR DIAGNOSTIC
STRIP MISCELLANEOUS
COMMUNITY
Start: 2022-09-29

## 2022-11-29 NOTE — PROGRESS NOTES
CHIEF COMPLAINT  Chief Complaint   Patient presents with   • Cough         HPI  Amrita Buckley II is a 57 y.o. male  presents with complaint of cough he has been fighting off for several weeks. It seems to be getting worse. He sometimes needs an inhaler and Zpack.     Review of Systems   Constitutional: Positive for fatigue and fever. Negative for chills and diaphoresis.   HENT: Negative for congestion.    Respiratory: Positive for cough, chest tightness and wheezing. Negative for shortness of breath.    Cardiovascular: Negative for chest pain.   Gastrointestinal: Negative for constipation, nausea and vomiting.   Musculoskeletal: Negative for myalgias.   Neurological: Positive for headaches.       Past Medical History:   Diagnosis Date   • Bronchitis    • Elevated cholesterol    • Hypertension    • Type 2 diabetes mellitus (HCC)        Family History   Problem Relation Age of Onset   • Lung disease Mother    • Heart disease Mother    • Diabetes Father        Social History     Socioeconomic History   • Marital status: Single   Tobacco Use   • Smoking status: Never   • Smokeless tobacco: Never   Vaping Use   • Vaping Use: Never used   Substance and Sexual Activity   • Alcohol use: Not Currently   • Drug use: No   • Sexual activity: Defer       Amrita Buckley II  reports that he has never smoked. He has never used smokeless tobacco.    There were no vitals taken for this visit.    PHYSICAL EXAM  Physical Exam   Constitutional: He is oriented to person, place, and time. He appears well-developed and well-nourished. He does not have a sickly appearance. He does not appear ill. No distress.   HENT:   Head: Normocephalic and atraumatic.   Eyes: EOM are normal.   Neck: Neck normal appearance.  Pulmonary/Chest: Effort normal.  No respiratory distress.  Neurological: He is alert and oriented to person, place, and time.   Skin: Skin is dry.   Psychiatric: He has a normal mood and affect.         Diagnoses and all orders for this  visit:    1. Acute bronchitis, unspecified organism (Primary)    Other orders  -     azithromycin (Zithromax Z-Volodymyr) 250 MG tablet; Take 2 tablets by mouth on day 1, then 1 tablet daily on days 2-5  Dispense: 6 tablet; Refill: 0  -     albuterol sulfate  (90 Base) MCG/ACT inhaler; Inhale 2 puffs Every 4 (Four) Hours As Needed for Wheezing.  Dispense: 6.7 g; Refill: 0        The use of a video visit has been reviewed with the patient and verbal informed consent has een obtained. Myself and Amrita Buckley II participated in this visit. The patient is located in [address] [place] [address]. I am located in [place], [place]. [employer] and Zoom were utilized.       Note Disclaimer: At [facility], we believe that sharing information builds trust and better   relationships. You are receiving this note because you recently visited [facility]. It is possible you   will see health information before a provider has talked with you about it. This kind of information can   be easy to misunderstand. To help you fully understand what it means for your health, we urge you to   discuss this note with your provider.    Michelle Pérez, APRN  11/29/2022  18:57 EST

## 2022-12-26 DIAGNOSIS — E11.65 TYPE 2 DIABETES MELLITUS WITH HYPERGLYCEMIA, WITHOUT LONG-TERM CURRENT USE OF INSULIN: ICD-10-CM

## 2022-12-27 RX ORDER — PEN NEEDLE, DIABETIC 32GX 5/32"
NEEDLE, DISPOSABLE MISCELLANEOUS
Qty: 100 EACH | OUTPATIENT
Start: 2022-12-27

## 2023-08-01 PROBLEM — N18.30 STAGE 3 CHRONIC KIDNEY DISEASE: Status: ACTIVE | Noted: 2023-05-31

## 2023-08-01 PROBLEM — E78.5 HYPERLIPIDEMIA: Status: ACTIVE | Noted: 2023-08-01

## 2023-08-01 PROBLEM — I10 HTN (HYPERTENSION): Status: ACTIVE | Noted: 2023-05-28

## 2023-08-01 PROBLEM — Z91.199 NONCOMPLIANCE: Status: ACTIVE | Noted: 2023-05-28

## 2023-08-01 PROBLEM — I50.32 HYPERTENSIVE HEART DISEASE WITH CHRONIC DIASTOLIC CONGESTIVE HEART FAILURE: Status: ACTIVE | Noted: 2023-08-01

## 2023-08-01 PROBLEM — E11.65 UNCONTROLLED TYPE 2 DIABETES MELLITUS WITH HYPERGLYCEMIA: Status: ACTIVE | Noted: 2023-05-31

## 2023-08-01 PROBLEM — I11.0 HYPERTENSIVE HEART DISEASE WITH CHRONIC DIASTOLIC CONGESTIVE HEART FAILURE: Status: ACTIVE | Noted: 2023-08-01

## 2023-08-01 NOTE — PROGRESS NOTES
[facility] New Patient Visit      Date: 2023  Patient Name: Amrita Buckley II  : 1965   MRN: [id number]     PCP: Larissa Alexandre APRN   Referring Provider: Larissa Alexandre APRN     Chief Complaint:    Chief Complaint   Patient presents with    Establish Care     CHF       History of Present Illness  Amrita Buckley II is a 58 y.o. male  referred here by YUSRA Francis for cardiac evaluation.  Patient has had multiple hospitalizations over the last several months.  Patient was initially admitted to [facility] for stroke evaluation.  Patient had word finding difficulties with hypertensive emergency.  Patient was also admitted to [facility] with shortness of breath.  During that hospitalization patient was found to be in acute heart failure as well as acute renal failure.  Patient had follow-up with her PCP was referred to us for further cardiac evaluation.  Patient states that he has never had a cardiac evaluation.  He was seen at  both on 2023 and 2023.  He was noted to have hypertensive urgency and acute renal failure.  He does state that recently has not been taking care of himself.  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  He states that after this he lost his insurance and did not keep up with his health.  He has had uncontrolled hypertension and diabetes recently.  He does not have a follow-up appointment with nephrology until November.  He states that after he was discharged from the hospital about 3 weeks ago he still had significant lower extremity edema and shortness of breath with exertion which has improved.  He states that he is trying to be more active walking 7000 steps a day.  He does have a scheduled sleep study as he is high risk for sleep apnea.  Patient states currently his blood pressure still running in the 140s-150s      Problem List      CARDIAC  Coronary Artery Disease:   Asymptomatic  Myocardium:   Echo 2023  (): EF 55-60, mildly dilated RV    Valvular:   No significant valvular disease    Electrical:   Normal sinus rhythm  Percardium:   Normal    CARDIAC RISK FACTORS:         Hypertension         Diabetes         Dyslipidemia         Obesity         Obstructive Sleep Apnea      NON-CARDIAC:  Erectile dysfunction  Chronic kidney disease    SURGERIES:   Left rotator cuff repair  Subjective     ROS   Systems reviewed and pertinent positives and negatives noted in the HPI.    Medications:     Current Outpatient Medications:     albuterol sulfate  (90 Base) MCG/ACT inhaler, Inhale 2 puffs Every 4 (Four) Hours As Needed for Wheezing., Disp: 6.7 g, Rfl: 0    amLODIPine (NORVASC) 10 MG tablet, Take 1 tablet by mouth Daily., Disp: , Rfl:     BD Pen Needle Vidhya 2nd Gen 32G X 4 MM misc, USE ONCE DAILY WITH VICTOZA, Disp: 50 each, Rfl: 0    hydrALAZINE (APRESOLINE) 25 MG tablet, Take 1 tablet by mouth Every 6 (Six) Hours., Disp: , Rfl:     Insulin Lispro, 1 Unit Dial, (HUMALOG) 100 UNIT/ML solution pen-injector, , Disp: , Rfl:     isosorbide mononitrate (IMDUR) 60 MG 24 hr tablet, Take 1 tablet by mouth., Disp: , Rfl:     Lantus SoloStar 100 UNIT/ML injection pen, , Disp: , Rfl:     levocetirizine (XYZAL) 5 MG tablet, Take 1 tablet by mouth Every Evening., Disp: , Rfl:     OneTouch Verio test strip, USE TO TEST THREE TIMES DAILY AS DIRECTED, Disp: , Rfl:     Semaglutide,0.25 or 0.5MG/DOS, (OZEMPIC) 2 MG/3ML solution pen-injector, Inject 0.25 mg under the skin into the appropriate area as directed., Disp: , Rfl:     bumetanide (BUMEX) 2 MG tablet, , Disp: , Rfl:     dilTIAZem CD (Cardizem CD) 240 MG 24 hr capsule, Take 1 capsule by mouth Daily., Disp: 90 capsule, Rfl: 3    sodium zirconium cyclosilicate (Lokelma) 10 g pack, Take 10 g by mouth., Disp: , Rfl:      Allergies:   No Known Allergies      The following portions of the patient's history were reviewed and updated as appropriate: allergies, current medications, past  "family history, past medical history, past social history, past surgical history and problem list.    Objective     Vitals:    08/03/23 0847   BP: 164/82   BP Location: Left arm   Patient Position: Sitting   Pulse: 78   SpO2: 98%   Weight: 126 kg (278 lb)   Height: 177.8 cm (70\")      Body mass index is 39.89 kg/mý.     Physical Exam   Constitutional: Obese, no acute distress  HENT: Normocephalic, atraumatic moist oral mucosa  Neck: Neck supple. No JVD present. No carotid bruits.   Cardiovascular: Regular rate, regular rhythm and normal heart sounds. No murmur heard.   Pulmonary/Chest: Clear to auscultation bilaterally without wheezing, rhonchi, or rails  Abdominal: Nondistended, nontender.  Musculoskeletal: No obvious deformity  Neurological: Alert and oriented x3, no focal deficits  Extremities:  1+ pitting edema bilaterally palpable PT pulses bilaterally    Labs:  6/28/2023  , K5, , glucose 168, BUN 42, creatinine 3.1  WBC 5.4, hemoglobin 12.1, hematocrit 37.3,   Lab Results   Component Value Date    HGBA1C 14.1 (H) 05/28/2023           ECG 12 Lead    Date/Time: 8/3/2023 9:23 AM  Performed by: Toñito Almazan MD  Authorized by: Toñito Almazan MD   Previous ECG: no previous ECG available  Rhythm: sinus rhythm  BPM: 78    Clinical impression: normal ECG        Smoking Cessation:   Patient does not smoke    Advance Care Planning   ACP discussion was declined by the patient. Patient does not have an advance directive, declines further assistance.          Assessment / Plan    Assessment:   Diagnosis Plan   1. Primary hypertension  Basic Metabolic Panel    Microalbumin / Creatinine Urine Ratio - Urine, Clean Catch    Lipid Panel    Stress Test With Myocardial Perfusion One Day      2. Hyperlipidemia, unspecified hyperlipidemia type  Stress Test With Myocardial Perfusion One Day      3. Hypertensive heart disease with chronic diastolic congestive heart failure  Lipid Panel    JOE    ECG 12 Lead    " Stress Test With Myocardial Perfusion One Day      4. Stage 3b chronic kidney disease        5. Hyperkalemia  Basic Metabolic Panel           Plan:  Repeat blood work including BMP, lipid panel, microalbumin/creatinine ratio and JOE.  Last blood work patient had hyperkalemia with a potassium of 5.7 and he has not had repeat blood work since that time.  He also had a creatinine of 3.4.  He does not have a follow-up appointment with nephrology until November.  Discontinue carvedilol and start patient on Cardizem 240 mg daily for better blood pressure control as his blood pressures uncontrolled.  Agree that patient will need sleep study to rule out sleep apnea.          Follow Up:   Return in about 4 weeks (around 8/31/2023).

## 2023-08-03 ENCOUNTER — TELEPHONE (OUTPATIENT)
Dept: CARDIOLOGY | Facility: CLINIC | Age: 58
End: 2023-08-03

## 2023-08-03 ENCOUNTER — LAB (OUTPATIENT)
Dept: LAB | Facility: HOSPITAL | Age: 58
End: 2023-08-03
Payer: COMMERCIAL

## 2023-08-03 ENCOUNTER — OFFICE VISIT (OUTPATIENT)
Dept: CARDIOLOGY | Facility: CLINIC | Age: 58
End: 2023-08-03
Payer: COMMERCIAL

## 2023-08-03 VITALS
HEIGHT: 70 IN | DIASTOLIC BLOOD PRESSURE: 82 MMHG | SYSTOLIC BLOOD PRESSURE: 164 MMHG | HEART RATE: 78 BPM | BODY MASS INDEX: 39.8 KG/M2 | OXYGEN SATURATION: 98 % | WEIGHT: 278 LBS

## 2023-08-03 DIAGNOSIS — N18.32 STAGE 3B CHRONIC KIDNEY DISEASE: ICD-10-CM

## 2023-08-03 DIAGNOSIS — I11.0 HYPERTENSIVE HEART DISEASE WITH CHRONIC DIASTOLIC CONGESTIVE HEART FAILURE: ICD-10-CM

## 2023-08-03 DIAGNOSIS — I50.32 HYPERTENSIVE HEART DISEASE WITH CHRONIC DIASTOLIC CONGESTIVE HEART FAILURE: ICD-10-CM

## 2023-08-03 DIAGNOSIS — I10 PRIMARY HYPERTENSION: Primary | ICD-10-CM

## 2023-08-03 DIAGNOSIS — E87.5 HYPERKALEMIA: ICD-10-CM

## 2023-08-03 DIAGNOSIS — I10 PRIMARY HYPERTENSION: ICD-10-CM

## 2023-08-03 DIAGNOSIS — E78.5 HYPERLIPIDEMIA, UNSPECIFIED HYPERLIPIDEMIA TYPE: ICD-10-CM

## 2023-08-03 LAB
ALBUMIN UR-MCNC: 305 MG/DL
ANION GAP SERPL CALCULATED.3IONS-SCNC: 8.7 MMOL/L (ref 5–15)
BUN SERPL-MCNC: 42 MG/DL (ref 6–20)
BUN/CREAT SERPL: 12.7 (ref 7–25)
CALCIUM SPEC-SCNC: 9.4 MG/DL (ref 8.6–10.5)
CHLORIDE SERPL-SCNC: 109 MMOL/L (ref 98–107)
CHOLEST SERPL-MCNC: 102 MG/DL (ref 0–200)
CO2 SERPL-SCNC: 19.3 MMOL/L (ref 22–29)
CREAT SERPL-MCNC: 3.32 MG/DL (ref 0.76–1.27)
CREAT UR-MCNC: 56.4 MG/DL
EGFRCR SERPLBLD CKD-EPI 2021: 20.8 ML/MIN/1.73
GLUCOSE SERPL-MCNC: 122 MG/DL (ref 65–99)
HDLC SERPL-MCNC: 34 MG/DL (ref 40–60)
LDLC SERPL CALC-MCNC: 39 MG/DL (ref 0–100)
LDLC/HDLC SERPL: 0.98 {RATIO}
MICROALBUMIN/CREAT UR: 5407.8 MG/G
POTASSIUM SERPL-SCNC: 5.9 MMOL/L (ref 3.5–5.2)
SODIUM SERPL-SCNC: 137 MMOL/L (ref 136–145)
TRIGL SERPL-MCNC: 174 MG/DL (ref 0–150)
VLDLC SERPL-MCNC: 29 MG/DL (ref 5–40)

## 2023-08-03 PROCEDURE — 86038 ANTINUCLEAR ANTIBODIES: CPT

## 2023-08-03 PROCEDURE — 80048 BASIC METABOLIC PNL TOTAL CA: CPT

## 2023-08-03 PROCEDURE — 82043 UR ALBUMIN QUANTITATIVE: CPT

## 2023-08-03 PROCEDURE — 80061 LIPID PANEL: CPT

## 2023-08-03 PROCEDURE — 82570 ASSAY OF URINE CREATININE: CPT

## 2023-08-03 PROCEDURE — 36415 COLL VENOUS BLD VENIPUNCTURE: CPT

## 2023-08-03 RX ORDER — CARVEDILOL 12.5 MG/1
12.5 TABLET ORAL
COMMUNITY
Start: 2023-06-01 | End: 2023-08-03

## 2023-08-03 RX ORDER — LEVOCETIRIZINE DIHYDROCHLORIDE 5 MG/1
5 TABLET, FILM COATED ORAL EVERY EVENING
COMMUNITY

## 2023-08-03 RX ORDER — ISOSORBIDE MONONITRATE 60 MG/1
60 TABLET, EXTENDED RELEASE ORAL
COMMUNITY
Start: 2023-06-29

## 2023-08-03 RX ORDER — HYDRALAZINE HYDROCHLORIDE 25 MG/1
25 TABLET, FILM COATED ORAL EVERY 6 HOURS
COMMUNITY
Start: 2023-07-14

## 2023-08-03 RX ORDER — INSULIN GLARGINE 100 [IU]/ML
INJECTION, SOLUTION SUBCUTANEOUS
COMMUNITY
Start: 2023-06-01

## 2023-08-03 RX ORDER — INSULIN LISPRO 100 [IU]/ML
6 INJECTION, SOLUTION INTRAVENOUS; SUBCUTANEOUS
COMMUNITY
Start: 2023-06-01 | End: 2023-08-03

## 2023-08-03 RX ORDER — DILTIAZEM HYDROCHLORIDE 240 MG/1
240 CAPSULE, COATED, EXTENDED RELEASE ORAL DAILY
Qty: 90 CAPSULE | Refills: 3 | Status: SHIPPED | OUTPATIENT
Start: 2023-08-03

## 2023-08-03 RX ORDER — AMLODIPINE BESYLATE 10 MG/1
10 TABLET ORAL DAILY
COMMUNITY
Start: 2023-07-07

## 2023-08-03 RX ORDER — INSULIN LISPRO 100 [IU]/ML
INJECTION, SOLUTION INTRAVENOUS; SUBCUTANEOUS
COMMUNITY
Start: 2023-06-01

## 2023-08-03 RX ORDER — ATORVASTATIN CALCIUM 10 MG/1
10 TABLET, FILM COATED ORAL
COMMUNITY
Start: 2023-05-10 | End: 2023-08-03

## 2023-08-04 ENCOUNTER — TELEPHONE (OUTPATIENT)
Dept: CARDIOLOGY | Facility: CLINIC | Age: 58
End: 2023-08-04
Payer: COMMERCIAL

## 2023-08-04 LAB — ANA SER QL: NEGATIVE

## 2023-08-10 ENCOUNTER — OFFICE VISIT (OUTPATIENT)
Dept: PULMONOLOGY | Facility: CLINIC | Age: 58
End: 2023-08-10
Payer: COMMERCIAL

## 2023-08-10 VITALS
HEIGHT: 70 IN | DIASTOLIC BLOOD PRESSURE: 70 MMHG | TEMPERATURE: 97.8 F | HEART RATE: 70 BPM | SYSTOLIC BLOOD PRESSURE: 138 MMHG | OXYGEN SATURATION: 97 % | BODY MASS INDEX: 39.08 KG/M2 | WEIGHT: 273 LBS

## 2023-08-10 DIAGNOSIS — R06.02 SOB (SHORTNESS OF BREATH) ON EXERTION: ICD-10-CM

## 2023-08-10 DIAGNOSIS — R91.8 MULTIPLE PULMONARY NODULES: ICD-10-CM

## 2023-08-10 DIAGNOSIS — R59.0 MEDIASTINAL LYMPHADENOPATHY: ICD-10-CM

## 2023-08-10 DIAGNOSIS — I50.32 HYPERTENSIVE HEART DISEASE WITH CHRONIC DIASTOLIC CONGESTIVE HEART FAILURE: ICD-10-CM

## 2023-08-10 DIAGNOSIS — Z78.9 NON-SMOKER: ICD-10-CM

## 2023-08-10 DIAGNOSIS — I11.0 HYPERTENSIVE HEART DISEASE WITH CHRONIC DIASTOLIC CONGESTIVE HEART FAILURE: ICD-10-CM

## 2023-08-10 RX ORDER — SODIUM ZIRCONIUM CYCLOSILICATE 10 G/10G
10 POWDER, FOR SUSPENSION ORAL
COMMUNITY

## 2023-08-10 RX ORDER — BUMETANIDE 2 MG/1
TABLET ORAL
COMMUNITY
Start: 2023-08-07

## 2023-08-10 NOTE — PROGRESS NOTES
PULMONARY  NOTE    Chief Complaint     Abnormal CT scan of the chest, shortness of breath, rule out MOY, hypertensive emergency, kidney disease, obesity class II    History of Present Illness     58-year-old male referred for an abnormal CT scan of the chest    He is a non-smoker with no past history of known lung disease    He admittedly was not taking care of himself earlier this year and developed a hypertensive emergency for which he was seen both at [facility] in his local hospital.  He required hospitalization for a period of time    Part of the evaluation at [facility] who did a CT scan of the chest which included findings as noted below  No other CT scans of the chest for comparison    He indicates that he is better  Shortness of breath is much better  He has been prescribed albuterol in the past which he uses rarely    He is trying to do better at regular exercise, weight loss, and compliance with medications    He has multiple symptoms suggestive of obstructive sleep apnea and has a sleep study scheduled    Patient Active Problem List   Diagnosis    HTN (hypertension)    Hyperlipidemia    Noncompliance    Stage 3 chronic kidney disease    Uncontrolled type 2 diabetes mellitus with hyperglycemia    Hypertensive heart disease with chronic diastolic congestive heart failure    Multiple pulmonary nodules (Incidental LLL)    Mediastinal lymphadenopathy (10mm incidental)    Non-smoker     No Known Allergies    Current Outpatient Medications:     albuterol sulfate  (90 Base) MCG/ACT inhaler, Inhale 2 puffs Every 4 (Four) Hours As Needed for Wheezing., Disp: 6.7 g, Rfl: 0    amLODIPine (NORVASC) 10 MG tablet, Take 1 tablet by mouth Daily., Disp: , Rfl:     BD Pen Needle Vidhya 2nd Gen 32G X 4 MM misc, USE ONCE DAILY WITH VICTOZA, Disp: 50 each, Rfl: 0    bumetanide (BUMEX) 2 MG tablet, , Disp: , Rfl:     dilTIAZem CD (Cardizem CD) 240 MG 24 hr capsule, Take 1 capsule by mouth Daily., Disp: 90 capsule, Rfl: 3     "hydrALAZINE (APRESOLINE) 25 MG tablet, Take 1 tablet by mouth Every 6 (Six) Hours., Disp: , Rfl:     Insulin Lispro, 1 Unit Dial, (HUMALOG) 100 UNIT/ML solution pen-injector, , Disp: , Rfl:     isosorbide mononitrate (IMDUR) 60 MG 24 hr tablet, Take 1 tablet by mouth., Disp: , Rfl:     Lantus SoloStar 100 UNIT/ML injection pen, , Disp: , Rfl:     levocetirizine (XYZAL) 5 MG tablet, Take 1 tablet by mouth Every Evening., Disp: , Rfl:     OneTouch Verio test strip, USE TO TEST THREE TIMES DAILY AS DIRECTED, Disp: , Rfl:     Semaglutide,0.25 or 0.5MG/DOS, (OZEMPIC) 2 MG/3ML solution pen-injector, Inject 0.25 mg under the skin into the appropriate area as directed., Disp: , Rfl:     sodium zirconium cyclosilicate (Lokelma) 10 g pack, Take 10 g by mouth., Disp: , Rfl:   MEDICATION LIST AND ALLERGIES REVIEWED.    Family History   Problem Relation Age of Onset    Lung disease Mother     Heart disease Mother     Diabetes Father     Kidney disease Father     Heart attack Father      Social History     Tobacco Use    Smoking status: Never     Passive exposure: Past    Smokeless tobacco: Never   Vaping Use    Vaping Use: Never used   Substance Use Topics    Alcohol use: Yes     Comment: occasional    Drug use: No     Social History     Social History Narrative        Has worked as an [employer] [place]    Lifelong non-smoker     FAMILY AND SOCIAL HISTORY REVIEWED.    Review of Systems  IF PRESENT REFER TO SCANNED ROS SHEET FROM SAME DATE  OTHERWISE ROS OBTAINED AND NON-CONTRIBUTORY OVER HPI.    /70   Pulse 70   Temp 97.8 øF (36.6 øC)   Ht 177.8 cm (70\")   Wt 124 kg (273 lb)   SpO2 97% Comment: resting, room air  BMI 39.17 kg/mý   Physical Exam  Vitals and nursing note reviewed.   Constitutional:       General: He is not in acute distress.     Appearance: He is well-developed. He is not diaphoretic.   HENT:      Head: Normocephalic and atraumatic.   Neck:      Thyroid: No thyromegaly.   Cardiovascular:      Rate " and Rhythm: Normal rate and regular rhythm.      Heart sounds: Normal heart sounds. No murmur heard.  Pulmonary:      Effort: Pulmonary effort is normal.      Breath sounds: Normal breath sounds. No stridor.   Musculoskeletal:      Right lower leg: Edema present.      Left lower leg: Edema present.   Lymphadenopathy:      Cervical: No cervical adenopathy.      Upper Body:      Right upper body: No supraclavicular or epitrochlear adenopathy.      Left upper body: No supraclavicular or epitrochlear adenopathy.   Skin:     General: Skin is warm and dry.   Neurological:      Mental Status: He is alert and oriented to person, place, and time.   Psychiatric:         Behavior: Behavior normal.       Results     CT scan of the chest from [facility] from 5/30/2023 reviewed on PACS  A right paratracheal lymph node noted as well as some nodular density in the left base  No old scans available for comparison    PFTs reveal no airway obstruction, no restriction and a normal corrected diffusion capacity    Immunization History   Administered Date(s) Administered    COVID-19 (PFIZER) Purple Cap Monovalent 04/07/2021, 05/06/2021    Fluzone >6mos 11/23/2020, 11/17/2021     Problem List       ICD-10-CM ICD-9-CM   1. SOB (shortness of breath) on exertion  R06.02 786.05   2. Multiple pulmonary nodules (Incidental LLL)  R91.8 793.19   3. Mediastinal lymphadenopathy (10mm incidental)  R59.0 785.6   4. Non-smoker  Z78.9 V49.89   5. Hypertensive heart disease with chronic diastolic congestive heart failure  I11.0 402.91    I50.32 428.32       Discussion     We reviewed his test results  Back in May he had a marginally abnormal CT scan of the chest  He had some nodular densities in the left base and a borderline enlarged paratracheal lymph node  He is low risk is a non-smoker  However, we discussed a follow-up CT scan of the chest which I would do at 3 months    We reviewed his PFTs which are essentially normal    He is scheduled for sleep  study    He indicates that he is doing better at medication compliance and exercise    I will plan to see him back after his CT scan of the chest    Moderate level of Medical Decision Making complexity based on 1 undiagnosed new problem or 2 stable chronic conditions, independent interpretation of tests, and/or prescription drug management     Jorgito Lynch MD  Note electronically signed    CC: Larissa Alexandre APRN

## 2023-08-14 DIAGNOSIS — R91.8 MULTIPLE PULMONARY NODULES: Primary | ICD-10-CM

## 2023-09-11 ENCOUNTER — HOSPITAL ENCOUNTER (OUTPATIENT)
Dept: CARDIOLOGY | Facility: HOSPITAL | Age: 58
Discharge: HOME | End: 2023-09-11
Admitting: INTERNAL MEDICINE
Payer: COMMERCIAL

## 2023-09-11 DIAGNOSIS — I10 PRIMARY HYPERTENSION: ICD-10-CM

## 2023-09-11 DIAGNOSIS — I11.0 HYPERTENSIVE HEART DISEASE WITH CHRONIC DIASTOLIC CONGESTIVE HEART FAILURE: ICD-10-CM

## 2023-09-11 DIAGNOSIS — E78.5 HYPERLIPIDEMIA, UNSPECIFIED HYPERLIPIDEMIA TYPE: ICD-10-CM

## 2023-09-11 DIAGNOSIS — I50.32 HYPERTENSIVE HEART DISEASE WITH CHRONIC DIASTOLIC CONGESTIVE HEART FAILURE: ICD-10-CM

## 2023-09-11 LAB
BH CV REST NUCLEAR ISOTOPE DOSE: 10 MCI
BH CV STRESS BP STAGE 1: NORMAL
BH CV STRESS DURATION MIN STAGE 1: 3
BH CV STRESS DURATION MIN STAGE 2: 1
BH CV STRESS DURATION SEC STAGE 1: 0
BH CV STRESS DURATION SEC STAGE 2: 0
BH CV STRESS GRADE STAGE 1: 10
BH CV STRESS GRADE STAGE 2: 12
BH CV STRESS HR STAGE 1: 96
BH CV STRESS HR STAGE 2: 100
BH CV STRESS METS STAGE 1: 5
BH CV STRESS METS STAGE 2: 7.5
BH CV STRESS NUCLEAR ISOTOPE DOSE: 31.7 MCI
BH CV STRESS O2 STAGE 1: 99
BH CV STRESS O2 STAGE 2: 98
BH CV STRESS PROTOCOL 1: NORMAL
BH CV STRESS PROTOCOL 2 BP STAGE 2: NORMAL
BH CV STRESS PROTOCOL 2 BP STAGE 3: 83
BH CV STRESS PROTOCOL 2 BP STAGE 4: 82
BH CV STRESS PROTOCOL 2 COMMENTS STAGE 1: NORMAL
BH CV STRESS PROTOCOL 2 COMMENTS STAGE 2: NORMAL
BH CV STRESS PROTOCOL 2 DOSE REGADENOSON STAGE 1: 0.4
BH CV STRESS PROTOCOL 2 DURATION MIN STAGE 1: 1
BH CV STRESS PROTOCOL 2 DURATION MIN STAGE 2: 1
BH CV STRESS PROTOCOL 2 DURATION MIN STAGE 3: 1
BH CV STRESS PROTOCOL 2 DURATION MIN STAGE 4: 1
BH CV STRESS PROTOCOL 2 DURATION SEC STAGE 1: 0
BH CV STRESS PROTOCOL 2 DURATION SEC STAGE 2: 0
BH CV STRESS PROTOCOL 2 DURATION SEC STAGE 3: 0
BH CV STRESS PROTOCOL 2 DURATION SEC STAGE 4: 0
BH CV STRESS PROTOCOL 2 HR STAGE 1: 81
BH CV STRESS PROTOCOL 2 HR STAGE 2: 85
BH CV STRESS PROTOCOL 2 HR STAGE 4: NORMAL
BH CV STRESS PROTOCOL 2 O2 STAGE 1: 99
BH CV STRESS PROTOCOL 2 O2 STAGE 2: 100
BH CV STRESS PROTOCOL 2 O2 STAGE 3: 100
BH CV STRESS PROTOCOL 2 O2 STAGE 4: 100
BH CV STRESS PROTOCOL 2 STAGE 1: 1
BH CV STRESS PROTOCOL 2 STAGE 2: 2
BH CV STRESS PROTOCOL 2 STAGE 3: 3
BH CV STRESS PROTOCOL 2 STAGE 4: 4
BH CV STRESS PROTOCOL 2: NORMAL
BH CV STRESS RECOVERY BP: NORMAL MMHG
BH CV STRESS RECOVERY HR: 83 BPM
BH CV STRESS RECOVERY O2: 100 %
BH CV STRESS SPEED STAGE 1: 1.7
BH CV STRESS SPEED STAGE 2: 2.5
BH CV STRESS STAGE 1: 1
BH CV STRESS STAGE 2: 2
LV EF NUC BP: 68 %
MAXIMAL PREDICTED HEART RATE: 163 BPM
PERCENT MAX PREDICTED HR: 65.03 %
STRESS BASELINE BP: NORMAL MMHG
STRESS BASELINE HR: 78 BPM
STRESS O2 SAT REST: 99 %
STRESS PERCENT HR: 77 %
STRESS POST ESTIMATED WORKLOAD: 1 METS
STRESS POST EXERCISE DUR MIN: 4 MIN
STRESS POST EXERCISE DUR SEC: 0 SEC
STRESS POST O2 SAT PEAK: 100 %
STRESS POST PEAK BP: NORMAL MMHG
STRESS POST PEAK HR: 106 BPM
STRESS TARGET HR: 139 BPM

## 2023-09-11 PROCEDURE — 78452 HT MUSCLE IMAGE SPECT MULT: CPT | Performed by: INTERNAL MEDICINE

## 2023-09-11 PROCEDURE — 93017 CV STRESS TEST TRACING ONLY: CPT

## 2023-09-11 PROCEDURE — 0 TECHNETIUM SESTAMIBI: Performed by: INTERNAL MEDICINE

## 2023-09-11 PROCEDURE — A9500 TC99M SESTAMIBI: HCPCS | Performed by: INTERNAL MEDICINE

## 2023-09-11 PROCEDURE — 78452 HT MUSCLE IMAGE SPECT MULT: CPT

## 2023-09-11 PROCEDURE — 25010000002 REGADENOSON 0.4 MG/5ML SOLUTION: Performed by: INTERNAL MEDICINE

## 2023-09-11 PROCEDURE — 93018 CV STRESS TEST I&R ONLY: CPT | Performed by: INTERNAL MEDICINE

## 2023-09-11 RX ORDER — REGADENOSON 0.08 MG/ML
0.4 INJECTION, SOLUTION INTRAVENOUS ONCE
Status: COMPLETED | OUTPATIENT
Start: 2023-09-11 | End: 2023-09-11

## 2023-09-11 RX ADMIN — TECHNETIUM TC 99M SESTAMIBI 1 DOSE: 1 INJECTION INTRAVENOUS at 09:28

## 2023-09-11 RX ADMIN — TECHNETIUM TC 99M SESTAMIBI 1 DOSE: 1 INJECTION INTRAVENOUS at 07:40

## 2023-09-11 RX ADMIN — REGADENOSON 0.4 MG: 0.08 INJECTION, SOLUTION INTRAVENOUS at 09:23

## 2023-09-18 ENCOUNTER — HOSPITAL ENCOUNTER (OUTPATIENT)
Dept: CT IMAGING | Facility: HOSPITAL | Age: 58
Discharge: HOME | End: 2023-09-18
Admitting: INTERNAL MEDICINE
Payer: COMMERCIAL

## 2023-09-18 DIAGNOSIS — R91.8 MULTIPLE PULMONARY NODULES: ICD-10-CM

## 2023-09-18 PROCEDURE — 71250 CT THORAX DX C-: CPT

## 2023-09-19 ENCOUNTER — TELEPHONE (OUTPATIENT)
Dept: CARDIOLOGY | Facility: CLINIC | Age: 58
End: 2023-09-19
Payer: COMMERCIAL

## 2023-09-19 NOTE — TELEPHONE ENCOUNTER
----- Message from Toñito Almazan MD sent at 9/19/2023 10:22 AM EDT -----  Your stress test is normal.

## 2023-09-26 ENCOUNTER — DOCUMENTATION (OUTPATIENT)
Dept: PULMONOLOGY | Facility: CLINIC | Age: 58
End: 2023-09-26
Payer: COMMERCIAL

## 2023-09-26 NOTE — PROGRESS NOTES
The patient underwent a Chest CT which just revealed changes suggestive of a prior granulomatous insult, nothing active.    I have asked my staff to communicate these results to the patient and arrange a FU appointment.

## 2023-10-02 ENCOUNTER — PATIENT ROUNDING (BHMG ONLY) (OUTPATIENT)
Dept: SLEEP MEDICINE | Facility: HOSPITAL | Age: 58
End: 2023-10-02
Payer: COMMERCIAL

## 2023-10-02 ENCOUNTER — OFFICE VISIT (OUTPATIENT)
Dept: SLEEP MEDICINE | Facility: HOSPITAL | Age: 58
End: 2023-10-02
Payer: COMMERCIAL

## 2023-10-02 VITALS
HEIGHT: 70 IN | DIASTOLIC BLOOD PRESSURE: 63 MMHG | WEIGHT: 279.8 LBS | HEART RATE: 84 BPM | SYSTOLIC BLOOD PRESSURE: 149 MMHG | BODY MASS INDEX: 40.06 KG/M2 | OXYGEN SATURATION: 97 %

## 2023-10-02 DIAGNOSIS — G47.19 EXCESSIVE DAYTIME SLEEPINESS: ICD-10-CM

## 2023-10-02 DIAGNOSIS — G47.33 OBSTRUCTIVE SLEEP APNEA, ADULT: ICD-10-CM

## 2023-10-02 DIAGNOSIS — R06.83 SNORING: ICD-10-CM

## 2023-10-02 DIAGNOSIS — E66.9 OBESITY (BMI 30-39.9): Primary | ICD-10-CM

## 2023-10-02 NOTE — PROGRESS NOTES
Amrita Corbin See II is a 58 y.o. male.   Chief Complaint   Patient presents with    Sleeping Problem       HPI     58 y.o. male seen in consultation at the request of Larissa Alexandre APRN for evaluation of the above.     In early June he had hospital admission for what he was told was a hypertensive emergency.  I believe this first admission was at  but he had a subsequent admission at [facility] for pulmonary edema.  His echocardiogram at  revealed mild elevation of the right-sided pressures.  There was evidence of concurrent pulmonary edema.    His wife has noted a longstanding history of snoring.  She thinks that he has apneas.  She notes significant daytime somnolence.  She, herself, is a sleep apnea patient on CPAP therapy.    He thinks he averages around 8 hours of sleep per night.  He will nap during the day if needed.  If he gets a less than 8 hours of sleep per night he is very sleepy during the day.    He is a  but is currently not flying but thinks he may go back to that in the future potentially depending on how his health issues shape up.    [place] Scale is: 11/24    The patient's relevant past medical, surgical, family, and social history reviewed and updated in Epic as appropriate.    Current medications are:   Current Outpatient Medications:     albuterol sulfate  (90 Base) MCG/ACT inhaler, Inhale 2 puffs Every 4 (Four) Hours As Needed for Wheezing., Disp: 6.7 g, Rfl: 0    amLODIPine (NORVASC) 10 MG tablet, Take 1 tablet by mouth Daily., Disp: , Rfl:     BD Pen Needle Vidhya 2nd Gen 32G X 4 MM misc, USE ONCE DAILY WITH VICTOZA, Disp: 50 each, Rfl: 0    bumetanide (BUMEX) 2 MG tablet, , Disp: , Rfl:     dilTIAZem CD (Cardizem CD) 240 MG 24 hr capsule, Take 1 capsule by mouth Daily., Disp: 90 capsule, Rfl: 3    hydrALAZINE (APRESOLINE) 25 MG tablet, Take 1 tablet by mouth Every 6 (Six) Hours., Disp: , Rfl:     Insulin Lispro, 1 Unit Dial, (HUMALOG) 100 UNIT/ML solution pen-injector, ,  "Disp: , Rfl:     isosorbide mononitrate (IMDUR) 60 MG 24 hr tablet, Take 1 tablet by mouth., Disp: , Rfl:     Lantus SoloStar 100 UNIT/ML injection pen, , Disp: , Rfl:     levocetirizine (XYZAL) 5 MG tablet, Take 1 tablet by mouth Every Evening., Disp: , Rfl:     OneTouch Verio test strip, USE TO TEST THREE TIMES DAILY AS DIRECTED, Disp: , Rfl:     Semaglutide,0.25 or 0.5MG/DOS, (OZEMPIC) 2 MG/3ML solution pen-injector, Inject 0.25 mg under the skin into the appropriate area as directed., Disp: , Rfl:     sodium zirconium cyclosilicate (Lokelma) 10 g pack, Take 10 g by mouth., Disp: , Rfl: .    Review of Systems    Review of Systems  ROS documented in patient questionnaire ×14 systems.  Reviewed with patient.  Otherwise negative except as noted in HPI.    Physical Exam    Blood pressure 149/63, pulse 84, height 177.8 cm (70\"), weight 127 kg (279 lb 12.8 oz), SpO2 97 %. Body mass index is 40.15 kg/m².    Physical Exam  Vitals and nursing note reviewed.   Constitutional:       Appearance: Normal appearance. He is well-developed.   HENT:      Head: Normocephalic and atraumatic.      Nose: Nose normal.      Mouth/Throat:      Mouth: Mucous membranes are moist.      Pharynx: Oropharynx is clear. No oropharyngeal exudate.      Comments: Class IV airway  Eyes:      General: No scleral icterus.     Conjunctiva/sclera: Conjunctivae normal.   Neck:      Thyroid: No thyromegaly.      Trachea: No tracheal deviation.   Cardiovascular:      Rate and Rhythm: Normal rate and regular rhythm.      Heart sounds: No murmur heard.    No friction rub. No gallop.   Pulmonary:      Effort: Pulmonary effort is normal. No respiratory distress.      Breath sounds: No wheezing or rales.   Musculoskeletal:         General: No deformity. Normal range of motion.      Right lower leg: Edema present.      Left lower leg: Edema present.   Skin:     General: Skin is warm and dry.      Findings: No rash.   Neurological:      Mental Status: He is alert " and oriented to person, place, and time.   Psychiatric:         Behavior: Behavior normal.         Thought Content: Thought content normal.       DATA:    Reviewed 8/10/2023 note from Dr. Lynch    Reviewed 8/3/2023 labs including metabolic profile    ASSESSMENT:    Problem List Items Addressed This Visit       Obesity (BMI 30-39.9) - Primary     Other Visit Diagnoses       Snoring        Relevant Orders    Home Sleep Study    Excessive daytime sleepiness        Relevant Orders    Home Sleep Study    Obstructive sleep apnea, adult        Relevant Orders    Home Sleep Study            58-year-old male with a high likelihood of obstructive sleep apnea based primarily on his wife's observations of snoring, witnessed apneas and significant daytime somnolence.    His elevated BMI and class IV airway put him at morphologic risk for the above diagnoses.  There is evidence of mild pulm hypertension on his echocardiogram but this could certainly be explained based upon his left-sided heart failure and volume overload though certainly untreated obstructive sleep apnea can result in this level of pulm hypertension.    He certainly warrants further evaluation and potential treatment as outlined below.    PLAN:    Home sleep apnea testing is an appropriate initial diagnostic step in his case.  I discussed the diagnostic process as well as treatment options for obstructive sleep apnea if that is diagnosed.  I went over the long-term cardiovascular and metabolic risks of untreated obstructive sleep apnea.  I recommended long-term, healthy weight loss.  The patient was amenable to a trial of CPAP therapy if deemed appropriate after testing complete.  Close sleep center follow-up.      I have reviewed the results of my evaluation and impression and discussed my recommendations in detail with the patient.    Signed by  Medhat Triplett MD    October 2, 2023      CC: Larissa Alexandre APRN Obst, Meagan, APRN

## 2023-10-02 NOTE — PROGRESS NOTES
October 2, 2023    Hello, may I speak with Amrita Corbin See II?    My name is MIO       I am  with [facility]  [address]  [facility] [address] [phone].    Before we get started may I verify your date of birth? 1965    I am calling to officially welcome you to our practice and ask about your recent visit. Is this a good time to talk? yes    Tell me about your visit with us. What things went well?   EVERYTHING WENT GREAT WE ARE VERY IMPRESSED.        We're always looking for ways to make our patients' experiences even better. Do you have recommendations on ways we may improve?  no    Overall were you satisfied with your first visit to our practice? yes       I appreciate you taking the time to speak with me today. Is there anything else I can do for you? no      Thank you, and have a great day.

## 2023-10-03 RX ORDER — ATORVASTATIN CALCIUM 10 MG/1
TABLET, FILM COATED ORAL
Qty: 30 TABLET | Refills: 0 | OUTPATIENT
Start: 2023-10-03

## 2023-10-19 ENCOUNTER — HOSPITAL ENCOUNTER (OUTPATIENT)
Dept: SLEEP MEDICINE | Facility: HOSPITAL | Age: 58
End: 2023-10-19
Payer: COMMERCIAL

## 2023-10-19 VITALS — HEIGHT: 70 IN | WEIGHT: 279 LBS | BODY MASS INDEX: 39.94 KG/M2

## 2023-10-19 DIAGNOSIS — G47.33 OBSTRUCTIVE SLEEP APNEA, ADULT: ICD-10-CM

## 2023-10-19 DIAGNOSIS — R06.83 SNORING: ICD-10-CM

## 2023-10-19 DIAGNOSIS — G47.19 EXCESSIVE DAYTIME SLEEPINESS: ICD-10-CM

## 2023-10-19 PROCEDURE — 95800 SLP STDY UNATTENDED: CPT

## 2023-10-20 ENCOUNTER — TELEPHONE (OUTPATIENT)
Dept: SLEEP MEDICINE | Facility: HOSPITAL | Age: 58
End: 2023-10-20

## 2023-10-20 NOTE — TELEPHONE ENCOUNTER
Provider: CLINTON      Caller: NICOLE JACKSON     Relationship to Patient: SELF     Pharmacy: NA    Phone Number: [phone] (WIFE)     Reason for Call: WIFE IS RUNNING A BIT BEHIND, WILL HAVE SLEEP STUDY RESULTS BROUGHT IN BY [employer]

## 2023-10-24 DIAGNOSIS — G47.33 OBSTRUCTIVE SLEEP APNEA, ADULT: Primary | ICD-10-CM

## 2023-11-09 ENCOUNTER — OFFICE VISIT (OUTPATIENT)
Dept: CARDIOLOGY | Facility: CLINIC | Age: 58
End: 2023-11-09
Payer: COMMERCIAL

## 2023-11-09 VITALS
HEART RATE: 91 BPM | OXYGEN SATURATION: 98 % | WEIGHT: 283 LBS | HEIGHT: 71 IN | SYSTOLIC BLOOD PRESSURE: 145 MMHG | DIASTOLIC BLOOD PRESSURE: 82 MMHG | BODY MASS INDEX: 39.62 KG/M2

## 2023-11-09 DIAGNOSIS — E11.65 UNCONTROLLED TYPE 2 DIABETES MELLITUS WITH HYPERGLYCEMIA: ICD-10-CM

## 2023-11-09 DIAGNOSIS — I10 PRIMARY HYPERTENSION: Primary | ICD-10-CM

## 2023-11-09 DIAGNOSIS — N18.32 STAGE 3B CHRONIC KIDNEY DISEASE: ICD-10-CM

## 2023-11-09 RX ORDER — ERGOCALCIFEROL 1.25 MG/1
CAPSULE ORAL
COMMUNITY
Start: 2023-10-26

## 2023-11-09 RX ORDER — ATORVASTATIN CALCIUM 10 MG/1
10 TABLET, FILM COATED ORAL DAILY
COMMUNITY
Start: 2023-10-09

## 2023-11-09 NOTE — PROGRESS NOTES
Follow-up Visit      Date: 2023  Patient Name: Amrita Buckley II  : 1965   MRN: [id number]     Chief Complaint:    Chief Complaint   Patient presents with    Primary hypertension     3-Mo F/U       History of Present Illness: Amrita Buckley II is a 58 y.o. male who is here today for for his follow-up.  Slowly and surely he is getting better.  He is diabetes is under good control his kidney function has stabilized at this time.  He denies any chest pain any shortness of breath any dizziness any palpitations.  He is able to do most of his activities.  He is regularly following with his kidney doctor.      Problem List     CARDIAC:  Coronary Artery Disease:   Lexiscan 2023: Normal myocardial perfusion, low risk study    Myocardium:   Echo 2023 (): EF 55-60, mildly dilated RV    Valvular:   No significant valvular disease    Electrical:   Normal sinus rhythm    Percardium:   Normal      CARDIAC RISK FACTORS:  Hypertension  Diabetes  2023 A1C 14.1  Dyslipidemia  2023   HDL 34 LDL 39  Obesity  Obstructive Sleep Apnea      NON-CARDIAC:  Erectile dysfunction  Chronic kidney disease    SURGERIES:   Left rotator cuff repair        Subjective      Review of Systems:   Review of Systems   Constitutional:  Positive for fatigue.       Medications:     Current Outpatient Medications:     albuterol sulfate  (90 Base) MCG/ACT inhaler, Inhale 2 puffs Every 4 (Four) Hours As Needed for Wheezing., Disp: 6.7 g, Rfl: 0    amLODIPine (NORVASC) 10 MG tablet, Take 1 tablet by mouth Daily., Disp: , Rfl:     atorvastatin (LIPITOR) 10 MG tablet, Take 1 tablet by mouth Daily., Disp: , Rfl:     BD Pen Needle Vidhya 2nd Gen 32G X 4 MM misc, USE ONCE DAILY WITH VICTOZA, Disp: 50 each, Rfl: 0    bumetanide (BUMEX) 2 MG tablet, , Disp: , Rfl:     Continuous Blood Gluc Sensor (FreeStyle Margo 2 Sensor) misc, , Disp: , Rfl:     dilTIAZem CD (Cardizem CD) 240 MG 24 hr capsule, Take 1 capsule by  "mouth Daily., Disp: 90 capsule, Rfl: 3    hydrALAZINE (APRESOLINE) 25 MG tablet, Take 1 tablet by mouth Every 6 (Six) Hours., Disp: , Rfl:     Insulin Lispro, 1 Unit Dial, (HUMALOG) 100 UNIT/ML solution pen-injector, , Disp: , Rfl:     isosorbide mononitrate (IMDUR) 60 MG 24 hr tablet, Take 1 tablet by mouth., Disp: , Rfl:     Lantus SoloStar 100 UNIT/ML injection pen, , Disp: , Rfl:     levocetirizine (XYZAL) 5 MG tablet, Take 1 tablet by mouth Every Evening., Disp: , Rfl:     OneTouch Verio test strip, USE TO TEST THREE TIMES DAILY AS DIRECTED, Disp: , Rfl:     Semaglutide,0.25 or 0.5MG/DOS, (OZEMPIC) 2 MG/3ML solution pen-injector, Inject 0.25 mg under the skin into the appropriate area as directed., Disp: , Rfl:     sodium zirconium cyclosilicate (Lokelma) 10 g pack, Take 10 g by mouth., Disp: , Rfl:     vitamin D (ERGOCALCIFEROL) 1.25 MG (36467 UT) capsule capsule, , Disp: , Rfl:     Allergies:   No Known Allergies    Objective     Physical Exam:  Vitals:    11/09/23 1007 11/09/23 1033   BP: 178/82 145/82   BP Location: Left arm Left arm   Patient Position: Sitting Sitting   Cuff Size: Adult Adult   Pulse: 91    SpO2: 98%    Weight: 128 kg (283 lb)    Height: 180.3 cm (71\")      Body mass index is 39.47 kg/m².      Constitutional:       General: Not in acute distress.     Appearance: Healthy appearance. Not in distress.     Neck:     JVP: Not elevated     Carotid artery: No carotid bruit    Pulmonary:      Effort: Pulmonary effort is normal.      Breath sounds: Normal breath sounds. No wheezing. No rhonchi. No rales.     Cardiovascular:      Normal rate. Regular rhythm. Normal S1. Normal S2.      Murmurs: There is mild systolic murmur.      No gallop. No click. No rub.     Abdominal:      General: Bowel sounds are normal.      Palpations: Abdomen is soft.      Tenderness: There is no abdominal tenderness.    Extremities:     Pulses: Cannot palpate     Edema: Chronic edema    Smoking Cessation:   He never " smoked    Lab Review:   Lab Results   Component Value Date    GLUCOSE 122 (H) 08/03/2023    BUN 42 (H) 08/03/2023    CREATININE 3.32 (H) 08/03/2023    EGFRIFNONA 45 (L) 11/17/2021    EGFRIFAFRI 65 08/13/2015    BCR 12.7 08/03/2023    K 5.9 (H) 08/03/2023    CO2 19.3 (L) 08/03/2023    CALCIUM 9.4 08/03/2023    PROTENTOTREF 7.2 07/14/2015    ALBUMIN 3.60 11/17/2021    LABIL2 1.8 07/14/2015    AST 14 11/17/2021    ALT 12 11/17/2021     Lab Results   Component Value Date    WBC 8.26 06/27/2023    HGB 12.9 (L) 06/27/2023    HCT 40.7 06/27/2023    MCV 89 06/27/2023     06/27/2023     Lab Results   Component Value Date    CHOL 102 08/03/2023    CHLPL 91 (L) 08/28/2015    TRIG 174 (H) 08/03/2023    HDL 34 (L) 08/03/2023    LDL 39 08/03/2023               Assessment / Plan      Assessment:   Diagnosis Plan   1. Primary hypertension        2. Stage 3b chronic kidney disease        3. Uncontrolled type 2 diabetes mellitus with hyperglycemia             Plan:  Patient blood pressure has been running better at home.  We have checked it here twice and it has settled down.  Okay  Patient kidney function has been stable at this time following with his nephrologist.  His hemoglobin A1c is down to 6.6 at this time.      Follow Up:       Return in about 6 months (around 5/9/2024).    Toñito Alamzan MD

## 2023-12-21 ENCOUNTER — OFFICE VISIT (OUTPATIENT)
Dept: PULMONOLOGY | Facility: CLINIC | Age: 58
End: 2023-12-21
Payer: COMMERCIAL

## 2023-12-21 VITALS
DIASTOLIC BLOOD PRESSURE: 80 MMHG | SYSTOLIC BLOOD PRESSURE: 130 MMHG | HEART RATE: 79 BPM | WEIGHT: 285 LBS | OXYGEN SATURATION: 97 % | BODY MASS INDEX: 39.9 KG/M2 | HEIGHT: 71 IN | TEMPERATURE: 97.7 F

## 2023-12-21 DIAGNOSIS — I50.32 HYPERTENSIVE HEART DISEASE WITH CHRONIC DIASTOLIC CONGESTIVE HEART FAILURE: ICD-10-CM

## 2023-12-21 DIAGNOSIS — R59.0 MEDIASTINAL LYMPHADENOPATHY: Primary | ICD-10-CM

## 2023-12-21 DIAGNOSIS — R91.8 MULTIPLE PULMONARY NODULES: ICD-10-CM

## 2023-12-21 DIAGNOSIS — E66.9 OBESITY (BMI 30-39.9): ICD-10-CM

## 2023-12-21 DIAGNOSIS — I11.0 HYPERTENSIVE HEART DISEASE WITH CHRONIC DIASTOLIC CONGESTIVE HEART FAILURE: ICD-10-CM

## 2023-12-21 RX ORDER — IPRATROPIUM BROMIDE 21 UG/1
2 SPRAY, METERED NASAL EVERY 12 HOURS
Qty: 1 EACH | Refills: 11 | Status: SHIPPED | OUTPATIENT
Start: 2023-12-21

## 2023-12-21 RX ORDER — CODEINE PHOSPHATE/GUAIFENESIN 10-100MG/5
5 LIQUID (ML) ORAL 3 TIMES DAILY PRN
Qty: 118 ML | Refills: 0 | Status: SHIPPED | OUTPATIENT
Start: 2023-12-21

## 2023-12-21 NOTE — PROGRESS NOTES
PULMONARY  NOTE    Chief Complaint     Abnormal CT scan of the chest, hypertensive emergency, kidney disease, obesity class II    History of Present Illness     58-year-old male returns today for follow-up  I initially saw him 8/10/2023    He is a non-smoker with no past history of known lung disease    He had a CT scan of the chest when he was at [facility] which revealed some parenchymal densities and prominent lymph nodes  Our plan was for a follow-up CT scan of the chest which was obtained in September and is as noted below    Since I saw him he has had sinus congestion, drainage, postnasal drip  He is using Xyzal and possibly Flonase  He got a course of doxycycline  He continues to have persistent cough    Patient Active Problem List   Diagnosis    HTN (hypertension)    Hyperlipidemia    Noncompliance    Stage 3 chronic kidney disease    Uncontrolled type 2 diabetes mellitus with hyperglycemia    Hypertensive heart disease with chronic diastolic congestive heart failure    Multiple pulmonary nodules (Incidental LLL)    Mediastinal lymphadenopathy (10mm incidental)    Non-smoker    Obesity (BMI 30-39.9)      No Known Allergies    Current Outpatient Medications:     albuterol sulfate  (90 Base) MCG/ACT inhaler, Inhale 2 puffs Every 4 (Four) Hours As Needed for Wheezing., Disp: 6.7 g, Rfl: 0    amLODIPine (NORVASC) 10 MG tablet, Take 1 tablet by mouth Daily., Disp: , Rfl:     atorvastatin (LIPITOR) 10 MG tablet, Take 1 tablet by mouth Daily., Disp: , Rfl:     BD Pen Needle Vidhya 2nd Gen 32G X 4 MM misc, USE ONCE DAILY WITH VICTOZA, Disp: 50 each, Rfl: 0    bumetanide (BUMEX) 2 MG tablet, , Disp: , Rfl:     Continuous Blood Gluc Sensor (FreeStyle Margo 2 Sensor) misc, , Disp: , Rfl:     dilTIAZem CD (Cardizem CD) 240 MG 24 hr capsule, Take 1 capsule by mouth Daily., Disp: 90 capsule, Rfl: 3    hydrALAZINE (APRESOLINE) 25 MG tablet, Take 1 tablet by mouth Every 6 (Six) Hours., Disp: , Rfl:     Insulin Lispro, 1  Unit Dial, (HUMALOG) 100 UNIT/ML solution pen-injector, , Disp: , Rfl:     isosorbide mononitrate (IMDUR) 60 MG 24 hr tablet, Take 1 tablet by mouth., Disp: , Rfl:     Lantus SoloStar 100 UNIT/ML injection pen, , Disp: , Rfl:     levocetirizine (XYZAL) 5 MG tablet, Take 1 tablet by mouth Every Evening., Disp: , Rfl:     OneTouch Verio test strip, USE TO TEST THREE TIMES DAILY AS DIRECTED, Disp: , Rfl:     Semaglutide,0.25 or 0.5MG/DOS, (OZEMPIC) 2 MG/3ML solution pen-injector, Inject 0.25 mg under the skin into the appropriate area as directed., Disp: , Rfl:     sodium zirconium cyclosilicate (Lokelma) 10 g pack, Take 10 g by mouth., Disp: , Rfl:     vitamin D (ERGOCALCIFEROL) 1.25 MG (30217 UT) capsule capsule, , Disp: , Rfl:     guaiFENesin-codeine (GUAIFENESIN AC) 100-10 MG/5ML liquid, Take 5 mL by mouth 3 (Three) Times a Day As Needed for Cough., Disp: 118 mL, Rfl: 0    ipratropium (ATROVENT) 0.03 % nasal spray, 2 sprays into the nostril(s) as directed by provider Every 12 (Twelve) Hours., Disp: 1 each, Rfl: 11  MEDICATION LIST AND ALLERGIES REVIEWED.    Family History   Problem Relation Age of Onset    Hypertension Mother     Lung disease Mother     Heart disease Mother     Hypertension Father     Diabetes Father     Kidney disease Father     Heart attack Father     Stroke Maternal Grandmother     Stroke Paternal Grandmother      Social History     Tobacco Use    Smoking status: Never     Passive exposure: Past    Smokeless tobacco: Never   Vaping Use    Vaping Use: Never used   Substance Use Topics    Alcohol use: Yes     Comment: RARE    Drug use: No     Social History     Social History Narrative        Has worked as an     Lifelong non-smoker     FAMILY AND SOCIAL HISTORY REVIEWED.    Review of Systems  IF PRESENT REFER TO SCANNED ROS SHEET FROM SAME DATE  OTHERWISE ROS OBTAINED AND NON-CONTRIBUTORY OVER HPI.    /80 (BP Location: Right arm, Patient Position: Sitting, Cuff Size:  "Adult)   Pulse 79   Temp 97.7 °F (36.5 °C) (Infrared)   Ht 180.3 cm (70.98\")   Wt 129 kg (285 lb)   SpO2 97% Comment: room air at rest  BMI 39.77 kg/m²   Physical Exam  Vitals and nursing note reviewed.   Constitutional:       General: He is not in acute distress.     Appearance: He is well-developed. He is not diaphoretic.   HENT:      Head: Normocephalic and atraumatic.   Neck:      Thyroid: No thyromegaly.   Cardiovascular:      Rate and Rhythm: Normal rate and regular rhythm.      Heart sounds: Normal heart sounds. No murmur heard.  Pulmonary:      Effort: Pulmonary effort is normal.      Breath sounds: Normal breath sounds. No stridor.   Abdominal:      Palpations: Abdomen is soft.   Lymphadenopathy:      Cervical: No cervical adenopathy.      Upper Body:      Right upper body: No supraclavicular or epitrochlear adenopathy.      Left upper body: No supraclavicular or epitrochlear adenopathy.   Skin:     General: Skin is warm and dry.   Neurological:      Mental Status: He is alert and oriented to person, place, and time.   Psychiatric:         Behavior: Behavior normal.         Results     CT scan of chest on 9/18/2023 revealed clearing of the parenchymal densities and improvement and partially calcified mediastinal lymphadenopathy    Immunization History   Administered Date(s) Administered    COVID-19 (PFIZER) BIVALENT 12+YRS 08/25/2023    COVID-19 (PFIZER) Purple Cap Monovalent 04/07/2021, 05/06/2021    Fluzone (or Fluarix & Flulaval for VFC) >6mos 11/23/2020, 11/17/2021, 10/25/2023     Problem List       ICD-10-CM ICD-9-CM   1. Mediastinal lymphadenopathy (10mm incidental)  R59.0 785.6   2. Multiple pulmonary nodules (Incidental LLL)  R91.8 793.19   3. Hypertensive heart disease with chronic diastolic congestive heart failure  I11.0 402.91    I50.32 428.32   4. Obesity (BMI 30-39.9)  E66.9 278.00       Discussion     We reviewed his chest imaging  There appears to be interval improvement in the " radiographic findings which are probably related to his hypertensive emergency and pulmonary edema  No further workup is necessary in this non-smoker    He appears to have seasonally aggravated perennial rhinitis  I recommended antihistamine, fluticasone nasal spray, saline rinses, and I will prescribe Atrovent nasal spray  He is also requesting a cough syrup and I will prescribe a codeine-based cough syrup with no refills    I will see him back on an as-needed basis    Moderate level of Medical Decision Making complexity based on 2 or more chronic stable illnesses and an independent review of test results and/or prescription drug management.    Jorgito Lynch MD  Note electronically signed    CC: Larissa Alexandre APRN

## 2024-03-14 NOTE — PROGRESS NOTES
Sleep Clinic Follow Up Note    Chief Complaint  Follow-up    Subjective     History of Present Illness (from previous encounter on 10/2/2023 with Dr. Triplett):  In early June he had hospital admission for what he was told was a hypertensive emergency.  I believe this first admission was at  but he had a subsequent admission at [facility] for pulmonary edema.  His echocardiogram at  revealed mild elevation of the right-sided pressures.  There was evidence of concurrent pulmonary edema.     His wife has noted a longstanding history of snoring.  She thinks that he has apneas.  She notes significant daytime somnolence.  She, herself, is a sleep apnea patient on CPAP therapy.     He thinks he averages around 8 hours of sleep per night.  He will nap during the day if needed.  If he gets a less than 8 hours of sleep per night he is very sleepy during the day.     He is a  but is currently not flying but thinks he may go back to that in the future potentially depending on how his health issues shape up.     Rhodhiss Scale is: 11/24  (End copied text).    -A home sleep test was obtained on 10/20/2023 revealing moderate obstructive sleep apnea with an AHI of 19.6/H.    Interval History:  Amrita Buckley II is a 58 y.o. male returns for follow up and compliance of PAP therapy. The patient was last seen on 10/2/2023 with Dr. Triplett. Overall the patient feels good with regard to therapy. He has a significant leak that his wife reports. The device appears to be working appropriately. On average the patient sleeps 6-8 hours per night. The patient wakes 1 times per night. He feels improved with use of the device.    The patient reports the following changes to their medical and medication history since they were last seen:  Starting Dialysis  Going on Renal transplant list      Further details are as follows:      Rhodhiss Scale is (out of 24): Total score: 19     Weight:  Current Weight: 301 lb    The patient's  relevant past medical, surgical, family, and social history reviewed and updated in Epic as appropriate.    PMH:    Past Medical History:   Diagnosis Date    Bronchitis     Elevated cholesterol     Hyperlipidemia     Hypertension     Sleep apnea 10/2023    Type 2 diabetes mellitus      Past Surgical History:   Procedure Laterality Date    ROTATOR CUFF REPAIR Left        No Known Allergies    MEDS:  Prior to Admission medications    Medication Sig Start Date End Date Taking? Authorizing Provider   albuterol sulfate  (90 Base) MCG/ACT inhaler Inhale 2 puffs Every 4 (Four) Hours As Needed for Wheezing. 11/29/22   Michelle Pérez APRN   amLODIPine (NORVASC) 10 MG tablet Take 1 tablet by mouth Daily. 7/7/23   Maria Ines Jenkins MD   atorvastatin (LIPITOR) 10 MG tablet Take 1 tablet by mouth Daily. 10/9/23   Maria Ines Jenkins MD   BD Pen Needle Vidhya 2nd Gen 32G X 4 MM misc USE ONCE DAILY WITH VICTOZA 9/27/22   Jordi Good APRN   bumetanide (BUMEX) 2 MG tablet  8/7/23   Maria Ines Jenkins MD   Continuous Blood Gluc Sensor (FreeStyle Margo 2 Sensor) misc  10/2/23   Maria Ines Jenkins MD   dilTIAZem CD (Cardizem CD) 240 MG 24 hr capsule Take 1 capsule by mouth Daily. 8/3/23   Ana Maria Cohen PA-C   guaiFENesin-codeine (GUAIFENESIN AC) 100-10 MG/5ML liquid Take 5 mL by mouth 3 (Three) Times a Day As Needed for Cough. 12/21/23   Jorgito Lynch MD   hydrALAZINE (APRESOLINE) 25 MG tablet Take 1 tablet by mouth Every 6 (Six) Hours. 7/14/23   Maria Ines Jenkins MD   Insulin Lispro, 1 Unit Dial, (HUMALOG) 100 UNIT/ML solution pen-injector  6/1/23   Maria Ines Jenkins MD   ipratropium (ATROVENT) 0.03 % nasal spray 2 sprays into the nostril(s) as directed by provider Every 12 (Twelve) Hours. 12/21/23   Jorgito Lynch MD   isosorbide mononitrate (IMDUR) 60 MG 24 hr tablet Take 1 tablet by mouth. 6/29/23   Maria Ines Jenkins MD   Lantus SoloStar 100 UNIT/ML injection pen  " 6/1/23   Maria Ines Jenkins MD   levocetirizine (XYZAL) 5 MG tablet Take 1 tablet by mouth Every Evening.    Maria Ines Jenkins MD   OneTouch Verio test strip USE TO TEST THREE TIMES DAILY AS DIRECTED 9/29/22   Maria Ines Jenkins MD   Semaglutide,0.25 or 0.5MG/DOS, (OZEMPIC) 2 MG/3ML solution pen-injector Inject 0.25 mg under the skin into the appropriate area as directed. 6/1/23   Maria Ines Jenkins MD   sodium zirconium cyclosilicate (Lokelma) 10 g pack Take 10 g by mouth.    Maria Ines Jenkins MD   vitamin D (ERGOCALCIFEROL) 1.25 MG (70577 UT) capsule capsule  10/26/23   Maria Ines Jenkins MD         FH:  Family History   Problem Relation Age of Onset    Hypertension Mother     Lung disease Mother     Heart disease Mother     Hypertension Father     Diabetes Father     Kidney disease Father     Heart attack Father     Stroke Maternal Grandmother     Stroke Paternal Grandmother        Objective   Vital Signs:  Pulse 76   Ht 180.3 cm (71\")   Wt (!) 137 kg (301 lb 12.8 oz)   SpO2 96%   BMI 42.09 kg/m²     Patient's (Body mass index is 42.09 kg/m².) indicates that they are morbidly obese (BMI > 40 or > 35 with obesity - related health condition) with health related conditions that include obstructive sleep apnea . Weight is worsening. BMI is is above average; BMI management plan is completed. We discussed portion control and increasing exercise. He is trying to increase his exercise.           Physical Exam  Vitals reviewed.   Constitutional:       Appearance: Normal appearance.   HENT:      Head: Normocephalic and atraumatic.      Nose: Nose normal.      Mouth/Throat:      Mouth: Mucous membranes are moist.   Cardiovascular:      Rate and Rhythm: Normal rate and regular rhythm.      Heart sounds: No murmur heard.     No friction rub. No gallop.   Pulmonary:      Effort: Pulmonary effort is normal. No respiratory distress.      Breath sounds: Normal breath sounds. No wheezing or rhonchi. "   Neurological:      Mental Status: He is alert and oriented to person, place, and time.   Psychiatric:         Behavior: Behavior normal.               Result Review :           PAP Report:  AHI: 1.0/h  Days of Usage: 30/33 (91%)  Number of Days Greater than 4 hours: 23/33 (70%)  Average time (days used): 5 hours 2 minutes  95th Percentile Pressure: 11.8 cmH2O  95th percentile leaks: 40.8 L/min  Settings: Auto CPAP-8/18 cm H2O, EPR full-time, EPR level 1, response standard.       Assessment and Plan  Amrita Buckley II is a 58 y.o. male who returns for follow-up and compliance of PAP therapy.  The Pap report has been reviewed.  Overall usage is at 91% with compliance at 70%.  I have encouraged increase usage he is within full compliance.  Patient averages 5 hours and 2 minutes of therapy.  Sleep apnea is well-controlled with an AHI of 1.0/H. The patient has a history of moderate obstructive sleep apnea with an initial AHI of 19.6/H. I will refill the patient's supplies, and I have asked them to return for follow-up and compliance in 1 year or sooner should they have further questions or concerns.    Diagnoses and all orders for this visit:    1. MOY (obstructive sleep apnea) (Primary)  -     PAP Therapy    2. Obesity (BMI 30-39.9)           The patient continues to use and benefit from PAP therapy.    1. The patient was counseled regarding multimodal approach with healthy nutrition, healthy sleep, regular physical activity, social activities, counseling, and medications. Encouraged to practice lateral sleep position. Avoid alcohol and sedatives close to bedtime.     2.  We will refill supplies x1 year.  Return to clinic 1 year or sooner if symptoms warrant. I have reviewed the results of my evaluation and impression and discussed my recommendations in detail with the patient.           Follow Up  Return in about 1 year (around 3/19/2025) for Annual visit.  Patient was given instructions and counseling regarding his  condition or for health maintenance advice. Please see specific information pulled into the AVS if appropriate.       YUSRA Carr, ACNP-BC  Pulmonology, Critical Care, and Sleep Medicine

## 2024-03-19 ENCOUNTER — OFFICE VISIT (OUTPATIENT)
Dept: SLEEP MEDICINE | Facility: HOSPITAL | Age: 59
End: 2024-03-19
Payer: COMMERCIAL

## 2024-03-19 VITALS — HEART RATE: 76 BPM | WEIGHT: 301.8 LBS | BODY MASS INDEX: 42.25 KG/M2 | HEIGHT: 71 IN | OXYGEN SATURATION: 96 %

## 2024-03-19 DIAGNOSIS — E66.9 OBESITY (BMI 30-39.9): ICD-10-CM

## 2024-03-19 DIAGNOSIS — G47.33 OSA (OBSTRUCTIVE SLEEP APNEA): Primary | ICD-10-CM

## 2024-03-19 PROCEDURE — 99213 OFFICE O/P EST LOW 20 MIN: CPT | Performed by: NURSE PRACTITIONER

## 2024-03-19 RX ORDER — CALCITRIOL 0.25 UG/1
1 CAPSULE, LIQUID FILLED ORAL DAILY
COMMUNITY
Start: 2024-03-11

## 2024-03-19 RX ORDER — SODIUM BICARBONATE 650 MG/1
TABLET ORAL
COMMUNITY
Start: 2024-03-11

## 2024-03-19 RX ORDER — AZELASTINE HYDROCHLORIDE 137 UG/1
SPRAY, METERED NASAL
COMMUNITY
Start: 2024-02-27

## 2024-03-26 ENCOUNTER — TELEPHONE (OUTPATIENT)
Dept: CARDIOLOGY | Facility: CLINIC | Age: 59
End: 2024-03-26
Payer: COMMERCIAL

## 2024-03-26 PROBLEM — D50.9 IRON DEFICIENCY ANEMIA, UNSPECIFIED: Status: ACTIVE | Noted: 2024-03-26

## 2024-03-26 RX ORDER — SODIUM CHLORIDE 9 MG/ML
20 INJECTION, SOLUTION INTRAVENOUS ONCE
OUTPATIENT
Start: 2024-04-01

## 2024-03-26 NOTE — TELEPHONE ENCOUNTER
Caller: Amrita Buckley II    Relationship: Self    Best call back number: [phone] (home)       What is the best time to reach you: ANY    Who are you requesting to speak with (clinical staff, provider,  specific staff member): ANY      What was the call regarding: PATIENT CALLED TO INQUIRE ON THE STATUS OF THE DISABILITY PAPERWORK THAT HE DROPPED OFF TO DR GOODRICH. PLEASE CONTACT PATIENT AND ADVISE ON THIS ISSUE.    Is it okay if the provider responds through MyChart: PLEASE CALL

## 2024-03-27 NOTE — TELEPHONE ENCOUNTER
Spoke with patient, paperwork was sent to [employer] to get done. Pt verbalizes understanding.

## 2024-04-02 ENCOUNTER — APPOINTMENT (OUTPATIENT)
Dept: GENERAL RADIOLOGY | Facility: HOSPITAL | Age: 59
End: 2024-04-02
Payer: COMMERCIAL

## 2024-04-02 ENCOUNTER — HOSPITAL ENCOUNTER (INPATIENT)
Facility: HOSPITAL | Age: 59
LOS: 3 days | Discharge: HOME | End: 2024-04-05
Attending: STUDENT IN AN ORGANIZED HEALTH CARE EDUCATION/TRAINING PROGRAM | Admitting: INTERNAL MEDICINE
Payer: COMMERCIAL

## 2024-04-02 PROBLEM — N18.6 ESRD NEEDING DIALYSIS: Status: ACTIVE | Noted: 2024-04-02

## 2024-04-02 PROBLEM — Z99.2 ESRD NEEDING DIALYSIS: Status: ACTIVE | Noted: 2024-04-02

## 2024-04-02 LAB
ALBUMIN SERPL-MCNC: 3.6 G/DL (ref 3.5–5.2)
ALBUMIN/GLOB SERPL: 1.2 G/DL
ALP SERPL-CCNC: 88 U/L (ref 39–117)
ALT SERPL W P-5'-P-CCNC: 9 U/L (ref 1–41)
ANION GAP SERPL CALCULATED.3IONS-SCNC: 17 MMOL/L (ref 5–15)
AST SERPL-CCNC: 16 U/L (ref 1–40)
BASOPHILS # BLD AUTO: 0.07 10*3/MM3 (ref 0–0.2)
BASOPHILS NFR BLD AUTO: 0.7 % (ref 0–1.5)
BILIRUB SERPL-MCNC: 0.2 MG/DL (ref 0–1.2)
BUN SERPL-MCNC: 66 MG/DL (ref 6–20)
BUN/CREAT SERPL: 7.5 (ref 7–25)
CALCIUM SPEC-SCNC: 8.6 MG/DL (ref 8.6–10.5)
CHLORIDE SERPL-SCNC: 108 MMOL/L (ref 98–107)
CO2 SERPL-SCNC: 20 MMOL/L (ref 22–29)
CREAT SERPL-MCNC: 8.85 MG/DL (ref 0.76–1.27)
DEPRECATED RDW RBC AUTO: 48.5 FL (ref 37–54)
EGFRCR SERPLBLD CKD-EPI 2021: 6.4 ML/MIN/1.73
EOSINOPHIL # BLD AUTO: 0.22 10*3/MM3 (ref 0–0.4)
EOSINOPHIL NFR BLD AUTO: 2.2 % (ref 0.3–6.2)
ERYTHROCYTE [DISTWIDTH] IN BLOOD BY AUTOMATED COUNT: 14.6 % (ref 12.3–15.4)
GLOBULIN UR ELPH-MCNC: 2.9 GM/DL
GLUCOSE BLDC GLUCOMTR-MCNC: 93 MG/DL (ref 70–130)
GLUCOSE SERPL-MCNC: 78 MG/DL (ref 65–99)
HBA1C MFR BLD: 5.9 % (ref 4.8–5.6)
HCT VFR BLD AUTO: 27.7 % (ref 37.5–51)
HGB BLD-MCNC: 8.8 G/DL (ref 13–17.7)
IMM GRANULOCYTES # BLD AUTO: 0.03 10*3/MM3 (ref 0–0.05)
IMM GRANULOCYTES NFR BLD AUTO: 0.3 % (ref 0–0.5)
LYMPHOCYTES # BLD AUTO: 1.57 10*3/MM3 (ref 0.7–3.1)
LYMPHOCYTES NFR BLD AUTO: 15.5 % (ref 19.6–45.3)
MAGNESIUM SERPL-MCNC: 2.5 MG/DL (ref 1.6–2.6)
MCH RBC QN AUTO: 28.8 PG (ref 26.6–33)
MCHC RBC AUTO-ENTMCNC: 31.8 G/DL (ref 31.5–35.7)
MCV RBC AUTO: 90.5 FL (ref 79–97)
MONOCYTES # BLD AUTO: 0.96 10*3/MM3 (ref 0.1–0.9)
MONOCYTES NFR BLD AUTO: 9.5 % (ref 5–12)
NEUTROPHILS NFR BLD AUTO: 7.26 10*3/MM3 (ref 1.7–7)
NEUTROPHILS NFR BLD AUTO: 71.8 % (ref 42.7–76)
NRBC BLD AUTO-RTO: 0 /100 WBC (ref 0–0.2)
PLATELET # BLD AUTO: 329 10*3/MM3 (ref 140–450)
PMV BLD AUTO: 10.5 FL (ref 6–12)
POTASSIUM SERPL-SCNC: 4.5 MMOL/L (ref 3.5–5.2)
PROT SERPL-MCNC: 6.5 G/DL (ref 6–8.5)
RBC # BLD AUTO: 3.06 10*6/MM3 (ref 4.14–5.8)
SODIUM SERPL-SCNC: 145 MMOL/L (ref 136–145)
WBC NRBC COR # BLD AUTO: 10.11 10*3/MM3 (ref 3.4–10.8)

## 2024-04-02 PROCEDURE — 80053 COMPREHEN METABOLIC PANEL: CPT | Performed by: FAMILY MEDICINE

## 2024-04-02 PROCEDURE — 25010000002 HYDRALAZINE PER 20 MG: Performed by: FAMILY MEDICINE

## 2024-04-02 PROCEDURE — 82948 REAGENT STRIP/BLOOD GLUCOSE: CPT

## 2024-04-02 PROCEDURE — 85025 COMPLETE CBC W/AUTO DIFF WBC: CPT | Performed by: FAMILY MEDICINE

## 2024-04-02 PROCEDURE — 71045 X-RAY EXAM CHEST 1 VIEW: CPT

## 2024-04-02 PROCEDURE — 84145 PROCALCITONIN (PCT): CPT | Performed by: FAMILY MEDICINE

## 2024-04-02 PROCEDURE — 83735 ASSAY OF MAGNESIUM: CPT | Performed by: FAMILY MEDICINE

## 2024-04-02 PROCEDURE — 99222 1ST HOSP IP/OBS MODERATE 55: CPT | Performed by: FAMILY MEDICINE

## 2024-04-02 PROCEDURE — 83036 HEMOGLOBIN GLYCOSYLATED A1C: CPT | Performed by: FAMILY MEDICINE

## 2024-04-02 PROCEDURE — 25010000002 FUROSEMIDE PER 20 MG: Performed by: FAMILY MEDICINE

## 2024-04-02 PROCEDURE — 25010000002 HEPARIN (PORCINE) PER 1000 UNITS: Performed by: FAMILY MEDICINE

## 2024-04-02 RX ORDER — INSULIN LISPRO 100 [IU]/ML
2-9 INJECTION, SOLUTION INTRAVENOUS; SUBCUTANEOUS
Status: DISCONTINUED | OUTPATIENT
Start: 2024-04-02 | End: 2024-04-05 | Stop reason: HOSPADM

## 2024-04-02 RX ORDER — IBUPROFEN 600 MG/1
1 TABLET ORAL
Status: DISCONTINUED | OUTPATIENT
Start: 2024-04-02 | End: 2024-04-05 | Stop reason: HOSPADM

## 2024-04-02 RX ORDER — ONDANSETRON 2 MG/ML
4 INJECTION INTRAMUSCULAR; INTRAVENOUS EVERY 6 HOURS PRN
Status: DISCONTINUED | OUTPATIENT
Start: 2024-04-02 | End: 2024-04-05 | Stop reason: HOSPADM

## 2024-04-02 RX ORDER — POLYETHYLENE GLYCOL 3350 17 G/17G
17 POWDER, FOR SOLUTION ORAL DAILY PRN
Status: DISCONTINUED | OUTPATIENT
Start: 2024-04-02 | End: 2024-04-05 | Stop reason: HOSPADM

## 2024-04-02 RX ORDER — DILTIAZEM HYDROCHLORIDE 240 MG/1
240 CAPSULE, COATED, EXTENDED RELEASE ORAL DAILY
Status: DISCONTINUED | OUTPATIENT
Start: 2024-04-03 | End: 2024-04-03

## 2024-04-02 RX ORDER — HYDRALAZINE HYDROCHLORIDE 20 MG/ML
10 INJECTION INTRAMUSCULAR; INTRAVENOUS EVERY 6 HOURS PRN
Status: DISCONTINUED | OUTPATIENT
Start: 2024-04-02 | End: 2024-04-05 | Stop reason: HOSPADM

## 2024-04-02 RX ORDER — AMOXICILLIN 250 MG
2 CAPSULE ORAL 2 TIMES DAILY PRN
Status: DISCONTINUED | OUTPATIENT
Start: 2024-04-02 | End: 2024-04-05 | Stop reason: HOSPADM

## 2024-04-02 RX ORDER — BISACODYL 5 MG/1
5 TABLET, DELAYED RELEASE ORAL DAILY PRN
Status: DISCONTINUED | OUTPATIENT
Start: 2024-04-02 | End: 2024-04-05 | Stop reason: HOSPADM

## 2024-04-02 RX ORDER — HEPARIN SODIUM 5000 [USP'U]/ML
5000 INJECTION, SOLUTION INTRAVENOUS; SUBCUTANEOUS EVERY 12 HOURS SCHEDULED
Status: DISCONTINUED | OUTPATIENT
Start: 2024-04-02 | End: 2024-04-05 | Stop reason: HOSPADM

## 2024-04-02 RX ORDER — ACETAMINOPHEN 325 MG/1
650 TABLET ORAL EVERY 4 HOURS PRN
Status: DISCONTINUED | OUTPATIENT
Start: 2024-04-02 | End: 2024-04-05 | Stop reason: HOSPADM

## 2024-04-02 RX ORDER — ATORVASTATIN CALCIUM 10 MG/1
10 TABLET, FILM COATED ORAL NIGHTLY
Status: DISCONTINUED | OUTPATIENT
Start: 2024-04-02 | End: 2024-04-05 | Stop reason: HOSPADM

## 2024-04-02 RX ORDER — FUROSEMIDE 10 MG/ML
40 INJECTION INTRAMUSCULAR; INTRAVENOUS ONCE
Status: COMPLETED | OUTPATIENT
Start: 2024-04-02 | End: 2024-04-02

## 2024-04-02 RX ORDER — AMLODIPINE BESYLATE 10 MG/1
10 TABLET ORAL DAILY
Status: DISCONTINUED | OUTPATIENT
Start: 2024-04-03 | End: 2024-04-05 | Stop reason: HOSPADM

## 2024-04-02 RX ORDER — BUMETANIDE 1 MG/1
2 TABLET ORAL 2 TIMES DAILY
Status: DISCONTINUED | OUTPATIENT
Start: 2024-04-02 | End: 2024-04-05 | Stop reason: HOSPADM

## 2024-04-02 RX ORDER — NITROGLYCERIN 0.4 MG/1
0.4 TABLET SUBLINGUAL
Status: DISCONTINUED | OUTPATIENT
Start: 2024-04-02 | End: 2024-04-05 | Stop reason: HOSPADM

## 2024-04-02 RX ORDER — SODIUM CHLORIDE 0.9 % (FLUSH) 0.9 %
10 SYRINGE (ML) INJECTION AS NEEDED
Status: DISCONTINUED | OUTPATIENT
Start: 2024-04-02 | End: 2024-04-05 | Stop reason: HOSPADM

## 2024-04-02 RX ORDER — HYDRALAZINE HYDROCHLORIDE 25 MG/1
25 TABLET, FILM COATED ORAL 3 TIMES DAILY
Status: DISCONTINUED | OUTPATIENT
Start: 2024-04-02 | End: 2024-04-05

## 2024-04-02 RX ORDER — SODIUM CHLORIDE 0.9 % (FLUSH) 0.9 %
10 SYRINGE (ML) INJECTION EVERY 12 HOURS SCHEDULED
Status: DISCONTINUED | OUTPATIENT
Start: 2024-04-02 | End: 2024-04-05 | Stop reason: HOSPADM

## 2024-04-02 RX ORDER — NICOTINE POLACRILEX 4 MG
15 LOZENGE BUCCAL
Status: DISCONTINUED | OUTPATIENT
Start: 2024-04-02 | End: 2024-04-05 | Stop reason: HOSPADM

## 2024-04-02 RX ORDER — CLONIDINE HYDROCHLORIDE 0.1 MG/1
0.1 TABLET ORAL ONCE
Status: COMPLETED | OUTPATIENT
Start: 2024-04-02 | End: 2024-04-02

## 2024-04-02 RX ORDER — BISACODYL 10 MG
10 SUPPOSITORY, RECTAL RECTAL DAILY PRN
Status: DISCONTINUED | OUTPATIENT
Start: 2024-04-02 | End: 2024-04-05 | Stop reason: HOSPADM

## 2024-04-02 RX ORDER — DEXTROSE MONOHYDRATE 25 G/50ML
25 INJECTION, SOLUTION INTRAVENOUS
Status: DISCONTINUED | OUTPATIENT
Start: 2024-04-02 | End: 2024-04-05 | Stop reason: HOSPADM

## 2024-04-02 RX ORDER — SODIUM CHLORIDE 9 MG/ML
40 INJECTION, SOLUTION INTRAVENOUS AS NEEDED
Status: DISCONTINUED | OUTPATIENT
Start: 2024-04-02 | End: 2024-04-05 | Stop reason: HOSPADM

## 2024-04-02 RX ADMIN — HYDRALAZINE HYDROCHLORIDE 10 MG: 20 INJECTION INTRAMUSCULAR; INTRAVENOUS at 23:31

## 2024-04-02 RX ADMIN — ATORVASTATIN CALCIUM 10 MG: 10 TABLET, FILM COATED ORAL at 22:17

## 2024-04-02 RX ADMIN — HYDRALAZINE HYDROCHLORIDE 25 MG: 25 TABLET ORAL at 20:28

## 2024-04-02 RX ADMIN — Medication 10 ML: at 21:52

## 2024-04-02 RX ADMIN — BUMETANIDE 2 MG: 1 TABLET ORAL at 22:17

## 2024-04-02 RX ADMIN — HEPARIN SODIUM 5000 UNITS: 5000 INJECTION INTRAVENOUS; SUBCUTANEOUS at 21:52

## 2024-04-02 RX ADMIN — FUROSEMIDE 40 MG: 10 INJECTION, SOLUTION INTRAMUSCULAR; INTRAVENOUS at 23:31

## 2024-04-02 RX ADMIN — CLONIDINE HYDROCHLORIDE 0.1 MG: 0.1 TABLET ORAL at 22:17

## 2024-04-02 NOTE — H&P
[facility] Medicine Services  HISTORY AND PHYSICAL    Patient Name: Amrita Buckley II  : 1965  MRN: [id number]  Primary Care Physician: Larissa Alexandre APRN  Date of admission: 2024      Subjective   Subjective     Chief Complaint:  ESRD, Abnormal Labs     HPI:  Amrita Buckley II is a 58 y.o. male seen and examined by me this evening following direct admission from nephrology clinic with Dr. Soliman earlier today. Patient has history of CKD with worsening renal function and now CKD stage V. Patient admitted to initiate HD with plans for permHD cath placement and to start HD in the AM. Also plans for consultation and evaluation by Dr. Davenport with  for possible AV fistula in the near future. Patient denies any current complaints at this time other than generalized swelling, patient's wife updated at bedside as well.       Personal History     Past Medical History:   Diagnosis Date    Bronchitis     Elevated cholesterol     Hyperlipidemia     Hypertension     Sleep apnea 10/2023    Type 2 diabetes mellitus            Past Surgical History:   Procedure Laterality Date    ROTATOR CUFF REPAIR Left        Family History: family history includes Diabetes in his father; Heart attack in his father; Heart disease in his mother; Hypertension in his father and mother; Kidney disease in his father; Lung disease in his mother; Stroke in his maternal grandmother and paternal grandmother.     Social History:  reports that he has never smoked. He has been exposed to tobacco smoke. He has never used smokeless tobacco. He reports current alcohol use. He reports that he does not use drugs.  Social History     Social History Narrative        Has worked as an     Lifelong non-smoker       Medications:  Available home medication information reviewed.  Azelastine HCl, FreeStyle Margo 2 Sensor, Insulin Glargine, Insulin Lispro (1 Unit Dial), Insulin Pen Needle, Semaglutide(0.25 or  0.5MG/DOS), albuterol sulfate HFA, amLODIPine, atorvastatin, bumetanide, calcitriol, dilTIAZem CD, glucose blood, guaiFENesin-codeine, hydrALAZINE, ipratropium, isosorbide mononitrate, levocetirizine, sodium bicarbonate, sodium zirconium cyclosilicate, and vitamin D    No Known Allergies    Objective   Objective     Vital Signs:   Temp:  [97.9 °F (36.6 °C)] 97.9 °F (36.6 °C)  Heart Rate:  [74-77] 74  Resp:  [18] 18  BP: (189-193)/(83-90) 189/89  Flow (L/min):  [2.5-4] 4       Physical Exam   Constitutional: No acute distress, awake, alert, on RA   HENT: NCAT, nares patent, mucous membranes moist  Respiratory: Decreased BS bilaterally, no rhonchi or wheezing, respiratory effort normal   Cardiovascular: RRR, no murmurs, rubs, or gallops  Gastrointestinal: Positive bowel sounds, soft, nontender, nondistended  Musculoskeletal: 2-3+ bilateral ankle edema, no clubbing or cyanosis   Psychiatric: Appropriate affect, cooperative  Neurologic: Oriented x 3, strength symmetric in all extremities, Cranial Nerves grossly intact to confrontation, speech clear  Skin: No rashes      Result Review:  I have personally reviewed the results from the time of this admission to 4/2/2024 22:02 EDT and agree with these findings:  [x]  Laboratory list / accordion  []  Microbiology  []  Radiology  []  EKG/Telemetry   []  Cardiology/Vascular   []  Pathology  [x]  Old records  []  Other:  Most notable findings include:   Cr 8.85   Hgb 8.8   A1C 5.9       LAB RESULTS:      Lab 04/02/24 1918   WBC 10.11   HEMOGLOBIN 8.8*   HEMATOCRIT 27.7*   PLATELETS 329   NEUTROS ABS 7.26*   IMMATURE GRANS (ABS) 0.03   LYMPHS ABS 1.57   MONOS ABS 0.96*   EOS ABS 0.22   MCV 90.5         Lab 04/02/24 1918   SODIUM 145   POTASSIUM 4.5   CHLORIDE 108*   CO2 20.0*   ANION GAP 17.0*   BUN 66*   CREATININE 8.85*   EGFR 6.4*   GLUCOSE 78   CALCIUM 8.6   MAGNESIUM 2.5   HEMOGLOBIN A1C 5.90*         Lab 04/02/24 1918   TOTAL PROTEIN 6.5   ALBUMIN 3.6   GLOBULIN 2.9    ALT (SGPT) 9   AST (SGOT) 16   BILIRUBIN 0.2   ALK PHOS 88                     UA          5/28/2023    16:56 5/29/2023    16:47 6/27/2023    09:19   Urinalysis   Squamous Epithelial Cells, UA 0-5     0-5     0-5       Specific Gravity, UA 1.023     1.025     1.017       Blood, UA Small     Small     Trace       Leukocytes, UA Negative     Negative     Negative       RBC, UA 4-10     1     4-10       Bacteria, UA Negative     Negative     Negative          Details          This result is from an external source.               Microbiology Results (last 10 days)       ** No results found for the last 240 hours. **            No radiology results from the last 24 hrs        Assessment & Plan   Assessment & Plan       ESRD needing dialysis        Amrita Buckley is a 57 yo M that was a direct admission from Dr. Soliman office today with worsening renal failure and to initiate HD. Patient has a history of CKD secondary to T2DM and has had continued decline of his kidneys. He also has a PMH of HTN, HLD, MOY with use of CPAP at night. Patient resting comfortably in bed and wife updated at bedside. Pending further evaluation by Nephrology, GS, and IR in the AM, NPO after MN     ESRD needing HD   - Admitted from Dr. Soliman office, plans to initiate HD  - Consult to Nephrology, GS, and IR for HD cath placement and possible AV fistula formation   - Labs reviewed, plans for likely HD in the AM   - Likely will need outpatient HD arranged     T2DM   - FSBS c SSI coverage   - A1C 5.9     HTN  HLD   - Continue regular home amlodipine, bumex, cardizem, and Hydralazine   - Added PRN hydralazine and Clonidine x 1 ordered this PM while trying to get IV access     MOY  - Continued regular use of home CPAP       DVT prophylaxis:  Medical and mechanical DVT prophylaxis orders are present.          CODE STATUS:    Code Status and Medical Interventions:   Ordered at: 04/02/24 6583     Level Of Support Discussed With:    Patient     Code Status  (Patient has no pulse and is not breathing):    CPR (Attempt to Resuscitate)     Medical Interventions (Patient has pulse or is breathing):    Full Support       Expected Discharge   Expected discharge date/ time has not been documented.     HALEY Jon DO  04/02/24

## 2024-04-03 ENCOUNTER — APPOINTMENT (OUTPATIENT)
Dept: NEPHROLOGY | Facility: HOSPITAL | Age: 59
End: 2024-04-03
Payer: COMMERCIAL

## 2024-04-03 ENCOUNTER — APPOINTMENT (OUTPATIENT)
Dept: INTERVENTIONAL RADIOLOGY/VASCULAR | Facility: HOSPITAL | Age: 59
End: 2024-04-03
Payer: COMMERCIAL

## 2024-04-03 LAB
ALBUMIN SERPL-MCNC: 3.4 G/DL (ref 3.5–5.2)
ALBUMIN/GLOB SERPL: 1.7 G/DL
ALP SERPL-CCNC: 89 U/L (ref 39–117)
ALT SERPL W P-5'-P-CCNC: 9 U/L (ref 1–41)
ANION GAP SERPL CALCULATED.3IONS-SCNC: 16 MMOL/L (ref 5–15)
AST SERPL-CCNC: 14 U/L (ref 1–40)
BASOPHILS # BLD AUTO: 0.07 10*3/MM3 (ref 0–0.2)
BASOPHILS NFR BLD AUTO: 0.9 % (ref 0–1.5)
BILIRUB SERPL-MCNC: 0.2 MG/DL (ref 0–1.2)
BUN SERPL-MCNC: 67 MG/DL (ref 6–20)
BUN/CREAT SERPL: 7.7 (ref 7–25)
CALCIUM SPEC-SCNC: 8.2 MG/DL (ref 8.6–10.5)
CHLORIDE SERPL-SCNC: 107 MMOL/L (ref 98–107)
CO2 SERPL-SCNC: 19 MMOL/L (ref 22–29)
CREAT SERPL-MCNC: 8.71 MG/DL (ref 0.76–1.27)
DEPRECATED RDW RBC AUTO: 48.5 FL (ref 37–54)
EGFRCR SERPLBLD CKD-EPI 2021: 6.5 ML/MIN/1.73
EOSINOPHIL # BLD AUTO: 0.18 10*3/MM3 (ref 0–0.4)
EOSINOPHIL NFR BLD AUTO: 2.2 % (ref 0.3–6.2)
ERYTHROCYTE [DISTWIDTH] IN BLOOD BY AUTOMATED COUNT: 14.5 % (ref 12.3–15.4)
GLOBULIN UR ELPH-MCNC: 2 GM/DL
GLUCOSE BLDC GLUCOMTR-MCNC: 108 MG/DL (ref 70–130)
GLUCOSE BLDC GLUCOMTR-MCNC: 109 MG/DL (ref 70–130)
GLUCOSE BLDC GLUCOMTR-MCNC: 156 MG/DL (ref 70–130)
GLUCOSE BLDC GLUCOMTR-MCNC: 90 MG/DL (ref 70–130)
GLUCOSE SERPL-MCNC: 136 MG/DL (ref 65–99)
HAV IGM SERPL QL IA: NORMAL
HBV CORE IGM SERPL QL IA: NORMAL
HBV SURFACE AG SERPL QL IA: NORMAL
HCT VFR BLD AUTO: 25.6 % (ref 37.5–51)
HCV AB SER DONR QL: NORMAL
HGB BLD-MCNC: 8 G/DL (ref 13–17.7)
IMM GRANULOCYTES # BLD AUTO: 0.03 10*3/MM3 (ref 0–0.05)
IMM GRANULOCYTES NFR BLD AUTO: 0.4 % (ref 0–0.5)
INR PPP: 1.12 (ref 0.89–1.12)
LYMPHOCYTES # BLD AUTO: 1.09 10*3/MM3 (ref 0.7–3.1)
LYMPHOCYTES NFR BLD AUTO: 13.4 % (ref 19.6–45.3)
MAGNESIUM SERPL-MCNC: 2.5 MG/DL (ref 1.6–2.6)
MCH RBC QN AUTO: 28.5 PG (ref 26.6–33)
MCHC RBC AUTO-ENTMCNC: 31.3 G/DL (ref 31.5–35.7)
MCV RBC AUTO: 91.1 FL (ref 79–97)
MONOCYTES # BLD AUTO: 0.69 10*3/MM3 (ref 0.1–0.9)
MONOCYTES NFR BLD AUTO: 8.5 % (ref 5–12)
NEUTROPHILS NFR BLD AUTO: 6.06 10*3/MM3 (ref 1.7–7)
NEUTROPHILS NFR BLD AUTO: 74.6 % (ref 42.7–76)
NRBC BLD AUTO-RTO: 0 /100 WBC (ref 0–0.2)
PLATELET # BLD AUTO: 305 10*3/MM3 (ref 140–450)
PMV BLD AUTO: 10.4 FL (ref 6–12)
POTASSIUM SERPL-SCNC: 5 MMOL/L (ref 3.5–5.2)
PROCALCITONIN SERPL-MCNC: 0.18 NG/ML (ref 0–0.25)
PROT SERPL-MCNC: 5.4 G/DL (ref 6–8.5)
PROTHROMBIN TIME: 14.6 SECONDS (ref 12.2–14.5)
RBC # BLD AUTO: 2.81 10*6/MM3 (ref 4.14–5.8)
SODIUM SERPL-SCNC: 142 MMOL/L (ref 136–145)
WBC NRBC COR # BLD AUTO: 8.12 10*3/MM3 (ref 3.4–10.8)

## 2024-04-03 PROCEDURE — 77001 FLUOROGUIDE FOR VEIN DEVICE: CPT

## 2024-04-03 PROCEDURE — 80053 COMPREHEN METABOLIC PANEL: CPT | Performed by: FAMILY MEDICINE

## 2024-04-03 PROCEDURE — 36558 INSERT TUNNELED CV CATH: CPT

## 2024-04-03 PROCEDURE — C1894 INTRO/SHEATH, NON-LASER: HCPCS

## 2024-04-03 PROCEDURE — 85025 COMPLETE CBC W/AUTO DIFF WBC: CPT | Performed by: FAMILY MEDICINE

## 2024-04-03 PROCEDURE — 99152 MOD SED SAME PHYS/QHP 5/>YRS: CPT

## 2024-04-03 PROCEDURE — 25010000002 HEPARIN (PORCINE) PER 1000 UNITS: Performed by: INTERNAL MEDICINE

## 2024-04-03 PROCEDURE — 82948 REAGENT STRIP/BLOOD GLUCOSE: CPT

## 2024-04-03 PROCEDURE — 63710000001 INSULIN LISPRO (HUMAN) PER 5 UNITS: Performed by: FAMILY MEDICINE

## 2024-04-03 PROCEDURE — C1769 GUIDE WIRE: HCPCS

## 2024-04-03 PROCEDURE — 80074 ACUTE HEPATITIS PANEL: CPT | Performed by: INTERNAL MEDICINE

## 2024-04-03 PROCEDURE — 02HV33Z INSERTION OF INFUSION DEVICE INTO SUPERIOR VENA CAVA, PERCUTANEOUS APPROACH: ICD-10-PCS | Performed by: RADIOLOGY

## 2024-04-03 PROCEDURE — 25010000002 FENTANYL CITRATE (PF) 50 MCG/ML SOLUTION: Performed by: RADIOLOGY

## 2024-04-03 PROCEDURE — 25010000002 HEPARIN (PORCINE) PER 1000 UNITS

## 2024-04-03 PROCEDURE — 25010000002 HEPARIN (PORCINE) PER 1000 UNITS: Performed by: FAMILY MEDICINE

## 2024-04-03 PROCEDURE — C1750 CATH, HEMODIALYSIS,LONG-TERM: HCPCS

## 2024-04-03 PROCEDURE — 85610 PROTHROMBIN TIME: CPT | Performed by: RADIOLOGY

## 2024-04-03 PROCEDURE — 83735 ASSAY OF MAGNESIUM: CPT | Performed by: FAMILY MEDICINE

## 2024-04-03 PROCEDURE — 99232 SBSQ HOSP IP/OBS MODERATE 35: CPT | Performed by: INTERNAL MEDICINE

## 2024-04-03 PROCEDURE — 25010000002 CEFAZOLIN PER 500 MG

## 2024-04-03 PROCEDURE — 5A1D70Z PERFORMANCE OF URINARY FILTRATION, INTERMITTENT, LESS THAN 6 HOURS PER DAY: ICD-10-PCS | Performed by: INTERNAL MEDICINE

## 2024-04-03 PROCEDURE — 25010000002 MIDAZOLAM PER 1 MG: Performed by: RADIOLOGY

## 2024-04-03 PROCEDURE — 97162 PT EVAL MOD COMPLEX 30 MIN: CPT

## 2024-04-03 RX ORDER — MIDAZOLAM HYDROCHLORIDE 1 MG/ML
INJECTION INTRAMUSCULAR; INTRAVENOUS
Status: DISPENSED
Start: 2024-04-03 | End: 2024-04-04

## 2024-04-03 RX ORDER — HEPARIN SODIUM 1000 [USP'U]/ML
INJECTION, SOLUTION INTRAVENOUS; SUBCUTANEOUS
Status: COMPLETED
Start: 2024-04-03 | End: 2024-04-03

## 2024-04-03 RX ORDER — MIDAZOLAM HYDROCHLORIDE 1 MG/ML
INJECTION INTRAMUSCULAR; INTRAVENOUS AS NEEDED
Status: DISCONTINUED | OUTPATIENT
Start: 2024-04-03 | End: 2024-04-05 | Stop reason: HOSPADM

## 2024-04-03 RX ORDER — FENTANYL CITRATE 50 UG/ML
INJECTION, SOLUTION INTRAMUSCULAR; INTRAVENOUS
Status: DISPENSED
Start: 2024-04-03 | End: 2024-04-04

## 2024-04-03 RX ORDER — CEFAZOLIN SODIUM 1 G/3ML
INJECTION, POWDER, FOR SOLUTION INTRAMUSCULAR; INTRAVENOUS
Status: COMPLETED
Start: 2024-04-03 | End: 2024-04-03

## 2024-04-03 RX ORDER — ALBUMIN (HUMAN) 12.5 G/50ML
12.5 SOLUTION INTRAVENOUS AS NEEDED
Status: ACTIVE | OUTPATIENT
Start: 2024-04-03 | End: 2024-04-04

## 2024-04-03 RX ORDER — LIDOCAINE HYDROCHLORIDE AND EPINEPHRINE 10; 10 MG/ML; UG/ML
INJECTION, SOLUTION INFILTRATION; PERINEURAL
Status: COMPLETED
Start: 2024-04-03 | End: 2024-04-03

## 2024-04-03 RX ORDER — HEPARIN SODIUM 1000 [USP'U]/ML
1000 INJECTION, SOLUTION INTRAVENOUS; SUBCUTANEOUS AS NEEDED
Status: DISCONTINUED | OUTPATIENT
Start: 2024-04-03 | End: 2024-04-05 | Stop reason: HOSPADM

## 2024-04-03 RX ORDER — FENTANYL CITRATE 50 UG/ML
INJECTION, SOLUTION INTRAMUSCULAR; INTRAVENOUS AS NEEDED
Status: DISCONTINUED | OUTPATIENT
Start: 2024-04-03 | End: 2024-04-05 | Stop reason: HOSPADM

## 2024-04-03 RX ORDER — LIDOCAINE HYDROCHLORIDE 10 MG/ML
INJECTION, SOLUTION EPIDURAL; INFILTRATION; INTRACAUDAL; PERINEURAL
Status: COMPLETED
Start: 2024-04-03 | End: 2024-04-03

## 2024-04-03 RX ORDER — CARVEDILOL 3.12 MG/1
3.12 TABLET ORAL 2 TIMES DAILY WITH MEALS
Status: DISCONTINUED | OUTPATIENT
Start: 2024-04-03 | End: 2024-04-05

## 2024-04-03 RX ADMIN — Medication 10 ML: at 20:29

## 2024-04-03 RX ADMIN — HEPARIN SODIUM 1000 UNITS: 1000 INJECTION INTRAVENOUS; SUBCUTANEOUS at 17:38

## 2024-04-03 RX ADMIN — HEPARIN SODIUM 5000 UNITS: 5000 INJECTION INTRAVENOUS; SUBCUTANEOUS at 09:03

## 2024-04-03 RX ADMIN — HEPARIN SODIUM 5000 UNITS: 5000 INJECTION INTRAVENOUS; SUBCUTANEOUS at 20:28

## 2024-04-03 RX ADMIN — HEPARIN SODIUM 4500 UNITS: 1000 INJECTION INTRAVENOUS; SUBCUTANEOUS at 15:01

## 2024-04-03 RX ADMIN — LIDOCAINE HYDROCHLORIDE AND EPINEPHRINE 13 ML: 10; 10 INJECTION, SOLUTION INFILTRATION; PERINEURAL at 15:01

## 2024-04-03 RX ADMIN — HYDRALAZINE HYDROCHLORIDE 25 MG: 25 TABLET ORAL at 20:28

## 2024-04-03 RX ADMIN — BUMETANIDE 2 MG: 1 TABLET ORAL at 20:29

## 2024-04-03 RX ADMIN — FENTANYL CITRATE 50 MCG: 50 INJECTION, SOLUTION INTRAMUSCULAR; INTRAVENOUS at 14:49

## 2024-04-03 RX ADMIN — DILTIAZEM HYDROCHLORIDE 240 MG: 240 CAPSULE, EXTENDED RELEASE ORAL at 09:03

## 2024-04-03 RX ADMIN — LIDOCAINE HYDROCHLORIDE 5 ML: 10 INJECTION, SOLUTION EPIDURAL; INFILTRATION; INTRACAUDAL; PERINEURAL at 15:03

## 2024-04-03 RX ADMIN — MIDAZOLAM HYDROCHLORIDE 1 MG: 1 INJECTION, SOLUTION INTRAMUSCULAR; INTRAVENOUS at 14:49

## 2024-04-03 RX ADMIN — Medication 10 ML: at 09:03

## 2024-04-03 RX ADMIN — INSULIN LISPRO 2 UNITS: 100 INJECTION, SOLUTION INTRAVENOUS; SUBCUTANEOUS at 09:03

## 2024-04-03 RX ADMIN — CEFAZOLIN SODIUM: 1 INJECTION, POWDER, FOR SOLUTION INTRAMUSCULAR; INTRAVENOUS at 14:44

## 2024-04-03 RX ADMIN — ATORVASTATIN CALCIUM 10 MG: 10 TABLET, FILM COATED ORAL at 20:29

## 2024-04-03 RX ADMIN — HYDRALAZINE HYDROCHLORIDE 25 MG: 25 TABLET ORAL at 09:03

## 2024-04-03 RX ADMIN — HYDRALAZINE HYDROCHLORIDE 25 MG: 25 TABLET ORAL at 18:25

## 2024-04-03 RX ADMIN — CARVEDILOL 3.12 MG: 3.12 TABLET, FILM COATED ORAL at 18:25

## 2024-04-03 RX ADMIN — AMLODIPINE BESYLATE 10 MG: 10 TABLET ORAL at 09:03

## 2024-04-03 RX ADMIN — BUMETANIDE 2 MG: 1 TABLET ORAL at 09:02

## 2024-04-03 NOTE — PLAN OF CARE
Goal Outcome Evaluation: Scheduled HD completed. Pt tolerated well. This was pt's first hd tx. Goal reached. Blood reinfused back to pt. Called report to SANTANA Jacobo      Problem: Device-Related Complication Risk (Hemodialysis)  Goal: Safe, Effective Therapy Delivery  Outcome: Ongoing, Progressing     Problem: Hemodynamic Instability (Hemodialysis)  Goal: Effective Tissue Perfusion  Outcome: Ongoing, Progressing     Problem: Fluid Volume Excess  Goal: Fluid Balance  Outcome: Ongoing, Progressing

## 2024-04-03 NOTE — PAYOR COMM NOTE
"Nicole Buckley II (58 y.o. Male)    Attn: Sarah ROGERS   Date of Birth   1965    Social Security Number       Address   [address] [place] [address]    Home Phone   [phone]    MRN   [id number]       [facility]    Marital Status                               Admission Date   24    Admission Type   Urgent    Admitting Provider   Yasmine Mckinnon DO    Attending Provider   Yasmine Mckinnon DO    Department, Room/Bed   [address], S445/1       Discharge Date       Discharge Disposition       Discharge Destination                                 Attending Provider: Yasmine Mckinnon DO    Allergies: No Known Allergies    Isolation: None   Infection: None   Code Status: CPR    Ht: 180.3 cm (71\")   Wt: 141 kg (310 lb 13.6 oz)    Admission Cmt: None   Principal Problem: ESRD needing dialysis [N18.6,Z99.2]                   Active Insurance as of 2024       Primary Coverage       Payor Plan Insurance Group Employer/Plan Group    MISC COMMERCIAL MISC COMMERCIAL [id number]       Coverage Address Coverage Phone Number Coverage Fax Number Effective Dates    [address] [phone]  2023 - None Entered    [place] [address]         Subscriber Name Subscriber Birth Date Member ID       NICOLE BUCKLEY II 1965 [id number]                     Emergency Contacts        (Rel.) Home Phone Work Phone Mobile Phone    lyssa buckley (Spouse) [phone] -- --              [place]: RUST [id number] Tax ID [id number]  Insurance Information                  MISC COMMERCIAL/MISC COMMERCIAL Phone: [phone]    Subscriber: Nicole Buckley II Subscriber#: [id number]    Group#: [id number] Precert#: --             History & Physical        DON Jon DO at [address] [place] [address] Medicine Services  HISTORY AND PHYSICAL    Patient Name: Nicole Buckley II  : 1965  MRN: [id number]  Primary Care " Physician: Larissa Alexandre APRN  Date of admission: 4/2/2024      Subjective  Subjective     Chief Complaint:  ESRD, Abnormal Labs     HPI:  Amrita Corbin See II is a 58 y.o. male seen and examined by me this evening following direct admission from nephrology clinic with Dr. Soliman earlier today. Patient has history of CKD with worsening renal function and now CKD stage V. Patient admitted to initiate HD with plans for permHD cath placement and to start HD in the AM. Also plans for consultation and evaluation by Dr. Davenport with  for possible AV fistula in the near future. Patient denies any current complaints at this time other than generalized swelling, patient's wife updated at bedside as well.       Personal History     Past Medical History:   Diagnosis Date    Bronchitis     Elevated cholesterol     Hyperlipidemia     Hypertension     Sleep apnea 10/2023    Type 2 diabetes mellitus            Past Surgical History:   Procedure Laterality Date    ROTATOR CUFF REPAIR Left        Family History: family history includes Diabetes in his father; Heart attack in his father; Heart disease in his mother; Hypertension in his father and mother; Kidney disease in his father; Lung disease in his mother; Stroke in his maternal grandmother and paternal grandmother.     Social History:  reports that he has never smoked. He has been exposed to tobacco smoke. He has never used smokeless tobacco. He reports current alcohol use. He reports that he does not use drugs.  Social History     Social History Narrative        Has worked as an     Lifelong non-smoker       Medications:  Available home medication information reviewed.  Azelastine HCl, FreeStyle Margo 2 Sensor, Insulin Glargine, Insulin Lispro (1 Unit Dial), Insulin Pen Needle, Semaglutide(0.25 or 0.5MG/DOS), albuterol sulfate HFA, amLODIPine, atorvastatin, bumetanide, calcitriol, dilTIAZem CD, glucose blood, guaiFENesin-codeine, hydrALAZINE, ipratropium,  isosorbide mononitrate, levocetirizine, sodium bicarbonate, sodium zirconium cyclosilicate, and vitamin D    No Known Allergies    Objective  Objective     Vital Signs:   Temp:  [97.9 °F (36.6 °C)] 97.9 °F (36.6 °C)  Heart Rate:  [74-77] 74  Resp:  [18] 18  BP: (189-193)/(83-90) 189/89  Flow (L/min):  [2.5-4] 4       Physical Exam   Constitutional: No acute distress, awake, alert, on RA   HENT: NCAT, nares patent, mucous membranes moist  Respiratory: Decreased BS bilaterally, no rhonchi or wheezing, respiratory effort normal   Cardiovascular: RRR, no murmurs, rubs, or gallops  Gastrointestinal: Positive bowel sounds, soft, nontender, nondistended  Musculoskeletal: 2-3+ bilateral ankle edema, no clubbing or cyanosis   Psychiatric: Appropriate affect, cooperative  Neurologic: Oriented x 3, strength symmetric in all extremities, Cranial Nerves grossly intact to confrontation, speech clear  Skin: No rashes      Result Review:  I have personally reviewed the results from the time of this admission to 4/2/2024 22:02 EDT and agree with these findings:  [x]  Laboratory list / accordion  []  Microbiology  []  Radiology  []  EKG/Telemetry   []  Cardiology/Vascular   []  Pathology  [x]  Old records  []  Other:  Most notable findings include:   Cr 8.85   Hgb 8.8   A1C 5.9       LAB RESULTS:      Lab 04/02/24 1918   WBC 10.11   HEMOGLOBIN 8.8*   HEMATOCRIT 27.7*   PLATELETS 329   NEUTROS ABS 7.26*   IMMATURE GRANS (ABS) 0.03   LYMPHS ABS 1.57   MONOS ABS 0.96*   EOS ABS 0.22   MCV 90.5         Lab 04/02/24 1918   SODIUM 145   POTASSIUM 4.5   CHLORIDE 108*   CO2 20.0*   ANION GAP 17.0*   BUN 66*   CREATININE 8.85*   EGFR 6.4*   GLUCOSE 78   CALCIUM 8.6   MAGNESIUM 2.5   HEMOGLOBIN A1C 5.90*         Lab 04/02/24 1918   TOTAL PROTEIN 6.5   ALBUMIN 3.6   GLOBULIN 2.9   ALT (SGPT) 9   AST (SGOT) 16   BILIRUBIN 0.2   ALK PHOS 88                     UA          5/28/2023    16:56 5/29/2023    16:47 6/27/2023    09:19   Urinalysis    Squamous Epithelial Cells, UA 0-5     0-5     0-5       Specific Gravity, UA 1.023     1.025     1.017       Blood, UA Small     Small     Trace       Leukocytes, UA Negative     Negative     Negative       RBC, UA 4-10     1     4-10       Bacteria, UA Negative     Negative     Negative          Details          This result is from an external source.               Microbiology Results (last 10 days)       ** No results found for the last 240 hours. **            No radiology results from the last 24 hrs        Assessment & Plan  Assessment & Plan       ESRD needing dialysis        Amrita Buckley is a 57 yo M that was a direct admission from Dr. Soliman office today with worsening renal failure and to initiate HD. Patient has a history of CKD secondary to T2DM and has had continued decline of his kidneys. He also has a PMH of HTN, HLD, MOY with use of CPAP at night. Patient resting comfortably in bed and wife updated at bedside. Pending further evaluation by Nephrology, GS, and IR in the AM, NPO after MN     ESRD needing HD   - Admitted from Dr. Soliman office, plans to initiate HD  - Consult to Nephrology, GS, and IR for HD cath placement and possible AV fistula formation   - Labs reviewed, plans for likely HD in the AM   - Likely will need outpatient HD arranged     T2DM   - FSBS c SSI coverage   - A1C 5.9     HTN  HLD   - Continue regular home amlodipine, bumex, cardizem, and Hydralazine   - Added PRN hydralazine and Clonidine x 1 ordered this PM while trying to get IV access     MOY  - Continued regular use of home CPAP       DVT prophylaxis:  Medical and mechanical DVT prophylaxis orders are present.          CODE STATUS:    Code Status and Medical Interventions:   Ordered at: 04/02/24 5239     Level Of Support Discussed With:    Patient     Code Status (Patient has no pulse and is not breathing):    CPR (Attempt to Resuscitate)     Medical Interventions (Patient has pulse or is breathing):    Full Support        Expected Discharge   Expected discharge date/ time has not been documented.     HALEY Jon DO  04/02/24      Electronically signed by DON Jon DO at 04/02/24 2220       Current Facility-Administered Medications   Medication Dose Route Frequency Provider Last Rate Last Admin    acetaminophen (TYLENOL) tablet 650 mg  650 mg Oral Q4H PRN DON Jon DO        amLODIPine (NORVASC) tablet 10 mg  10 mg Oral Daily DON Jon DO   10 mg at 04/03/24 0903    atorvastatin (LIPITOR) tablet 10 mg  10 mg Oral Nightly DON Jon DO   10 mg at 04/02/24 2217    sennosides-docusate (PERICOLACE) 8.6-50 MG per tablet 2 tablet  2 tablet Oral BID PRN DON Jon DO        And    polyethylene glycol (MIRALAX) packet 17 g  17 g Oral Daily PRN DON Jon DO        And    bisacodyl (DULCOLAX) EC tablet 5 mg  5 mg Oral Daily PRN DON Jon DO        And    bisacodyl (DULCOLAX) suppository 10 mg  10 mg Rectal Daily PRN DON Jon DO        bumetanide (BUMEX) tablet 2 mg  2 mg Oral BID DON Jon DO   2 mg at 04/03/24 0902    carvedilol (COREG) tablet 3.125 mg  3.125 mg Oral BID With Meals Yasmine Mckinnon,         dextrose (D50W) (25 g/50 mL) IV injection 25 g  25 g Intravenous Q15 Min PRN DON Jon DO        dextrose (GLUTOSE) oral gel 15 g  15 g Oral Q15 Min PRN DON Jon DO        glucagon (GLUCAGEN) injection 1 mg  1 mg Intramuscular Q15 Min PRN DON Jon DO        heparin (porcine) 5000 UNIT/ML injection 5,000 Units  5,000 Units Subcutaneous Q12H DON Jon DO   5,000 Units at 04/03/24 0903    hydrALAZINE (APRESOLINE) injection 10 mg  10 mg Intravenous Q6H PRN DON Jon DO   10 mg at 04/02/24 2331    hydrALAZINE (APRESOLINE) tablet 25 mg  25 mg Oral TID DON Jon DO   25 mg at 04/03/24 0903    Insulin Lispro (humaLOG) injection 2-9 Units  2-9 Units Subcutaneous 4x Daily AC & at Bedtime DON Jon,    2 Units at  04/03/24 0903    nitroglycerin (NITROSTAT) SL tablet 0.4 mg  0.4 mg Sublingual Q5 Min PRN DON Jon,         ondansetron (ZOFRAN) injection 4 mg  4 mg Intravenous Q6H PRN DON Jon,         sodium chloride 0.9 % flush 10 mL  10 mL Intravenous Q12H DON Jon, DO   10 mL at 04/03/24 0903    sodium chloride 0.9 % flush 10 mL  10 mL Intravenous PRN DON Jon,         sodium chloride 0.9 % infusion 40 mL  40 mL Intravenous PRN DON Jon, DO         Lab Results (last 24 hours)       Procedure Component Value Units Date/Time    POC Glucose Once [[id number]]  (Normal) Collected: 04/03/24 1139    Specimen: Blood Updated: 04/03/24 1141     Glucose 108 mg/dL     Protime-INR [[id number]]  (Abnormal) Collected: 04/03/24 0751    Specimen: Blood Updated: 04/03/24 0844     Protime 14.6 Seconds      INR 1.12    POC Glucose Once [[id number]]  (Abnormal) Collected: 04/03/24 0714    Specimen: Blood Updated: 04/03/24 0716     Glucose 156 mg/dL     Magnesium [[id number]]  (Normal) Collected: 04/03/24 0600    Specimen: Blood Updated: 04/03/24 0659     Magnesium 2.5 mg/dL     Comprehensive Metabolic Panel [[id number]]  (Abnormal) Collected: 04/03/24 0600    Specimen: Blood Updated: 04/03/24 0659     Glucose 136 mg/dL      BUN 67 mg/dL      Creatinine 8.71 mg/dL      Sodium 142 mmol/L      Potassium 5.0 mmol/L      Chloride 107 mmol/L      CO2 19.0 mmol/L      Calcium 8.2 mg/dL      Total Protein 5.4 g/dL      Albumin 3.4 g/dL      ALT (SGPT) 9 U/L      AST (SGOT) 14 U/L      Alkaline Phosphatase 89 U/L      Total Bilirubin 0.2 mg/dL      Globulin 2.0 gm/dL      Comment: Calculated Result        A/G Ratio 1.7 g/dL      BUN/Creatinine Ratio 7.7     Anion Gap 16.0 mmol/L      eGFR 6.5 mL/min/1.73      Comment: <15 Indicative of kidney failure       Narrative:      GFR Normal >60  Chronic Kidney Disease <60  Kidney Failure <15      CBC Auto Differential [[id number]]  (Abnormal) Collected: 04/03/24 0600     "Specimen: Blood Updated: 04/03/24 0635     WBC 8.12 10*3/mm3      RBC 2.81 10*6/mm3      Hemoglobin 8.0 g/dL      Hematocrit 25.6 %      MCV 91.1 fL      MCH 28.5 pg      MCHC 31.3 g/dL      RDW 14.5 %      RDW-SD 48.5 fl      MPV 10.4 fL      Platelets 305 10*3/mm3      Neutrophil % 74.6 %      Lymphocyte % 13.4 %      Monocyte % 8.5 %      Eosinophil % 2.2 %      Basophil % 0.9 %      Immature Grans % 0.4 %      Neutrophils, Absolute 6.06 10*3/mm3      Lymphocytes, Absolute 1.09 10*3/mm3      Monocytes, Absolute 0.69 10*3/mm3      Eosinophils, Absolute 0.18 10*3/mm3      Basophils, Absolute 0.07 10*3/mm3      Immature Grans, Absolute 0.03 10*3/mm3      nRBC 0.0 /100 WBC     Procalcitonin [[id number]]  (Normal) Collected: 04/02/24 1918    Specimen: Blood Updated: 04/03/24 0150     Procalcitonin 0.18 ng/mL     Narrative:      As a Marker for Sepsis (Non-Neonates):    1. <0.5 ng/mL represents a low risk of severe sepsis and/or septic shock.  2. >2 ng/mL represents a high risk of severe sepsis and/or septic shock.    As a Marker for Lower Respiratory Tract Infections that require antibiotic therapy:    PCT on Admission    Antibiotic Therapy       6-12 Hrs later    >0.5                Strongly Recommended  >0.25 - <0.5        Recommended   0.1 - 0.25          Discouraged              Remeasure/reassess PCT  <0.1                Strongly Discouraged     Remeasure/reassess PCT    As 28 day mortality risk marker: \"Change in Procalcitonin Result\" (>80% or <=80%) if Day 0 (or Day 1) and Day 4 values are available. Refer to http://www.[facility]-pct-calculator.com    Change in PCT <=80%  A decrease of PCT levels below or equal to 80% defines a positive change in PCT test result representing a higher risk for 28-day all-cause mortality of patients diagnosed with severe sepsis for septic shock.    Change in PCT >80%  A decrease of PCT levels of more than 80% defines a negative change in PCT result representing a lower risk for " 28-day all-cause mortality of patients diagnosed with severe sepsis or septic shock.       POC Glucose Once [[id number]]  (Normal) Collected: 04/02/24 2028    Specimen: Blood Updated: 04/02/24 2030     Glucose 93 mg/dL     Hemoglobin A1c [[id number]]  (Abnormal) Collected: 04/02/24 1918    Specimen: Blood Updated: 04/02/24 2017     Hemoglobin A1C 5.90 %     Narrative:      Hemoglobin A1C Ranges:    Increased Risk for Diabetes  5.7% to 6.4%  Diabetes                     >= 6.5%  Diabetic Goal                < 7.0%    Comprehensive Metabolic Panel [[id number]]  (Abnormal) Collected: 04/02/24 1918    Specimen: Blood Updated: 04/02/24 2010     Glucose 78 mg/dL      BUN 66 mg/dL      Creatinine 8.85 mg/dL      Sodium 145 mmol/L      Potassium 4.5 mmol/L      Chloride 108 mmol/L      CO2 20.0 mmol/L      Calcium 8.6 mg/dL      Total Protein 6.5 g/dL      Albumin 3.6 g/dL      ALT (SGPT) 9 U/L      AST (SGOT) 16 U/L      Alkaline Phosphatase 88 U/L      Total Bilirubin 0.2 mg/dL      Globulin 2.9 gm/dL      Comment: Calculated Result        A/G Ratio 1.2 g/dL      BUN/Creatinine Ratio 7.5     Anion Gap 17.0 mmol/L      eGFR 6.4 mL/min/1.73      Comment: <15 Indicative of kidney failure       Narrative:      GFR Normal >60  Chronic Kidney Disease <60  Kidney Failure <15      Magnesium [[id number]]  (Normal) Collected: 04/02/24 1918    Specimen: Blood Updated: 04/02/24 2010     Magnesium 2.5 mg/dL     CBC & Differential [[id number]]  (Abnormal) Collected: 04/02/24 1918    Specimen: Blood Updated: 04/02/24 1951    Narrative:      The following orders were created for panel order CBC & Differential.  Procedure                               Abnormality         Status                     ---------                               -----------         ------                     CBC Auto Differential[[id number]]        Abnormal            Final result                 Please view results for these tests on the individual orders.    CBC Auto  Differential [[id number]]  (Abnormal) Collected: 04/02/24 1918    Specimen: Blood Updated: 04/02/24 1951     WBC 10.11 10*3/mm3      RBC 3.06 10*6/mm3      Hemoglobin 8.8 g/dL      Hematocrit 27.7 %      MCV 90.5 fL      MCH 28.8 pg      MCHC 31.8 g/dL      RDW 14.6 %      RDW-SD 48.5 fl      MPV 10.5 fL      Platelets 329 10*3/mm3      Neutrophil % 71.8 %      Lymphocyte % 15.5 %      Monocyte % 9.5 %      Eosinophil % 2.2 %      Basophil % 0.7 %      Immature Grans % 0.3 %      Neutrophils, Absolute 7.26 10*3/mm3      Lymphocytes, Absolute 1.57 10*3/mm3      Monocytes, Absolute 0.96 10*3/mm3      Eosinophils, Absolute 0.22 10*3/mm3      Basophils, Absolute 0.07 10*3/mm3      Immature Grans, Absolute 0.03 10*3/mm3      nRBC 0.0 /100 WBC           Imaging Results (Last 24 Hours)       Procedure Component Value Units Date/Time    XR Chest 1 View [[id number]] Collected: 04/02/24 2310     Updated: 04/02/24 2318    Narrative:      XR CHEST 1 VW    Date of Exam: 4/2/2024 10:56 PM EDT    Indication: Dyspnea, FVO    Comparison: 8/10/2023    Findings:  There is dense and extensive airspace disease bilaterally, involving much of the left mid and lower lung, and right lower lung. There is probably mild upper lung disease as well. Airspace opacity partially silhouettes the left hemidiaphragm and right   heart shadow. There is minimal if any effusion. The heart shadow itself is partially obscured by the extensive airspace disease, but now appears mildly enlarged, increased from prior study. The pulmonary vasculature, where not obscured by pulmonary   disease, appears cephalized. No pneumothorax is seen. Bony structures appear intact.      Impression:      Impression:  Interval development of extensive bilateral airspace disease. Interval heart shadow enlargement and new pulmonary vascular congestion. Findings could all reflect volume overload with pulmonary alveolar edema, but a superimposed bilateral pneumonia should   be  considered as well.      Electronically Signed: Marcos Greenfield MD    4/2/2024 11:15 PM EDT    Workstation ID: [id number]          Orders (last 24 hrs)        Start     Ordered    04/03/24 1800  carvedilol (COREG) tablet 3.125 mg  2 Times Daily With Meals         04/03/24 1204    04/03/24 1142  POC Glucose Once  PROCEDURE ONCE        Comments: Complete no more than 45 minutes prior to patient eating      04/03/24 1139    04/03/24 0952  Hemodialysis Inpatient  Once         04/03/24 0951    04/03/24 0900  amLODIPine (NORVASC) tablet 10 mg  Daily         04/02/24 2209 04/03/24 0900  dilTIAZem CD (CARDIZEM CD) 24 hr capsule 240 mg  Daily,   Status:  Discontinued         04/02/24 2209 04/03/24 0800  IR Ins Tunneled Dialysis Catheter WO Port  1 Time Imaging,   Status:  Canceled         04/02/24 1838 04/03/24 0800  Oral Care  2 Times Daily       04/02/24 1855    04/03/24 0717  POC Glucose Once  PROCEDURE ONCE        Comments: Complete no more than 45 minutes prior to patient eating      04/03/24 0714    04/03/24 0716  IR Insert Tunneled CV Catheter Without Port 5 Plus  1 Time Imaging         04/02/24 1838    04/03/24 0714  Protime-INR  STAT         04/03/24 0713    04/03/24 0702  Inpatient Nephrology Consult  IN AM        Specialty:  Nephrology  Provider:  Suraj Victoria MD    04/02/24 1831    04/03/24 0702  Inpatient General Surgery Consult  IN AM,   Status:  Canceled        Specialty:  General Surgery  Provider:  Gume Allen MD    04/02/24 1836 04/03/24 0600  CBC Auto Differential  Morning Draw         04/02/24 1855    04/03/24 0600  Comprehensive Metabolic Panel  Morning Draw         04/02/24 1855    04/03/24 0600  Magnesium  Morning Draw         04/02/24 1855    04/03/24 0001  NPO Diet NPO Type: Strict NPO  Diet Effective Midnight         04/02/24 1836    04/02/24 2345  furosemide (LASIX) injection 40 mg  Once         04/02/24 2247    04/02/24 2340  NIPPV-IPPV / BIPAP / CPAP  At Bedtime & As Needed-RT          04/02/24 2339    04/02/24 2330  Procalcitonin  Once         04/02/24 2329    04/02/24 2300  bumetanide (BUMEX) tablet 2 mg  2 Times Daily         04/02/24 2209 04/02/24 2300  cloNIDine (CATAPRES) tablet 0.1 mg  Once         04/02/24 2209 04/02/24 2248  XR Chest 1 View  1 Time Imaging         04/02/24 2247 04/02/24 2221  PT Consult: Eval & Treat Functional Mobility Below Baseline  Once         04/02/24 2220 04/02/24 2215  atorvastatin (LIPITOR) tablet 10 mg  Nightly         04/02/24 2209 04/02/24 2200  Incentive Spirometry  Every 4 Hours While Awake       04/02/24 1855 04/02/24 2200  POC Glucose 4x Daily Before Meals & at Bedtime  4 Times Daily Before Meals & at Bedtime      Comments: Complete no more than 45 minutes prior to patient eating      04/02/24 1856 04/02/24 2115  hydrALAZINE (APRESOLINE) tablet 25 mg  3 Times Daily         04/02/24 2019 04/02/24 2100  sodium chloride 0.9 % flush 10 mL  Every 12 Hours Scheduled         04/02/24 1855 04/02/24 2100  heparin (porcine) 5000 UNIT/ML injection 5,000 Units  Every 12 Hours Scheduled         04/02/24 1855 04/02/24 2100  Insulin Lispro (humaLOG) injection 2-9 Units  4 Times Daily Before Meals & Nightly         04/02/24 1856 04/02/24 2031  POC Glucose Once  PROCEDURE ONCE        Comments: Complete no more than 45 minutes prior to patient eating      04/02/24 2028 04/02/24 2019  hydrALAZINE (APRESOLINE) injection 10 mg  Every 6 Hours PRN         04/02/24 2019    04/02/24 2000  Vital Signs  Every 4 Hours       04/02/24 1855 04/02/24 1857  Hemoglobin A1c  Once         04/02/24 1856 04/02/24 1856  dextrose (GLUTOSE) oral gel 15 g  Every 15 Minutes PRN         04/02/24 1856 04/02/24 1856  dextrose (D50W) (25 g/50 mL) IV injection 25 g  Every 15 Minutes PRN         04/02/24 1856 04/02/24 1856  glucagon (GLUCAGEN) injection 1 mg  Every 15 Minutes PRN         04/02/24 1856 04/02/24 1855  ondansetron (ZOFRAN) injection  "4 mg  Every 6 Hours PRN         04/02/24 1855    04/02/24 1854  sennosides-docusate (PERICOLACE) 8.6-50 MG per tablet 2 tablet  2 Times Daily PRN        Placed in \"And\" Linked Group    04/02/24 1855    04/02/24 1854  polyethylene glycol (MIRALAX) packet 17 g  Daily PRN        Placed in \"And\" Linked Group    04/02/24 1855    04/02/24 1854  bisacodyl (DULCOLAX) EC tablet 5 mg  Daily PRN        Placed in \"And\" Linked Group    04/02/24 1855    04/02/24 1854  bisacodyl (DULCOLAX) suppository 10 mg  Daily PRN        Placed in \"And\" Linked Group    04/02/24 1855    04/02/24 1854  acetaminophen (TYLENOL) tablet 650 mg  Every 4 Hours PRN         04/02/24 1855    04/02/24 1854  Intake & Output  Every Shift       04/02/24 1855    04/02/24 1854  Weigh Patient  Once         04/02/24 1855    04/02/24 1854  Insert Peripheral IV  Once         04/02/24 1855 04/02/24 1854  Saline Lock & Maintain IV Access  Continuous,   Status:  Canceled         04/02/24 1855    04/02/24 1854  Inpatient Admission  Once         04/02/24 1855 04/02/24 1854  Code Status and Medical Interventions:  Continuous         04/02/24 1855    04/02/24 1854  Place Sequential Compression Device  Once         04/02/24 1855    04/02/24 1854  Maintain Sequential Compression Device  Continuous         04/02/24 1855 04/02/24 1854  Continuous Cardiac Monitoring  Continuous        Comments: Follow Standing Orders As Outlined in Process Instructions (Open Order Report to View Full Instructions)    04/02/24 1855    04/02/24 1854  Maintain IV Access  Continuous         04/02/24 1855 04/02/24 1854  Telemetry - Place Orders & Notify Provider of Results When Patient Experiences Acute Chest Pain, Dysrhythmia or Respiratory Distress  Continuous        Comments: Open Order Report to View Parameters Requiring Provider Notification    04/02/24 1855    04/02/24 1854  May Be Off Telemetry for Tests  Continuous         04/02/24 1855 04/02/24 1853  nitroglycerin " (NITROSTAT) SL tablet 0.4 mg  Every 5 Minutes PRN         24 1855    24 1853  sodium chloride 0.9 % flush 10 mL  As Needed         24 18524 1853  sodium chloride 0.9 % infusion 40 mL  As Needed         24 18524 183  Diet: Renal; Low Potassium, Low Sodium (2-3g); Fluid Consistency: Thin (IDDSI 0)  Diet Effective Now,   Status:  Canceled         24 183  CBC & Differential  STAT         24 18324 183  Comprehensive Metabolic Panel  STAT         24 18324 183  Magnesium  STAT         24 18324 183  CBC Auto Differential  PROCEDURE ONCE         24 183    Unscheduled  Up in Chair  As Needed       24    Unscheduled  Follow Hypoglycemia Standing Orders For Blood Glucose <70 & Notify Provider of Treatment  As Needed      Comments: Follow Hypoglycemia Orders As Outlined in Process Instructions (Open Order Report to View Full Instructions)  Notify Provider Any Time Hypoglycemia Treatment is Administered    24    Signed and Held  albumin human 25 % IV SOLN 12.5 g  As Needed         Signed and Held                     Physician Progress Notes (last 24 hours)        Yasmine Mckinnon DO at 24 0732              [facility] Medicine Services  PROGRESS NOTE    Patient Name: Amrita Corbin See II  : 1965  MRN: [id number]    Date of Admission: 2024  Primary Care Physician: Larissa Alexandre APRN    Subjective   Subjective     CC:  Progression of CKD    HPI:  Feeling okay, short of breath.  Has outpatient follow-up with Dr. Allen next Tuesday that was previously scheduled prior to admission      Objective   Objective     Vital Signs:   Temp:  [97.5 °F (36.4 °C)-97.9 °F (36.6 °C)] 97.5 °F (36.4 °C)  Heart Rate:  [61-77] (P) 66  Resp:  [18] 18  BP: (141-195)/(67-90) (P) 159/78  Flow (L/min):  [2-7] (P) 2     Physical Exam:  Constitutional: No acute  distress, awake, alert  HENT: NCAT, mucous membranes moist  Respiratory: Respiratory effort normal   Cardiovascular: RRR on tele  Gastrointestinal: obese  Musculoskeletal: LE edema  Psychiatric: Appropriate affect, cooperative  Neurologic: Oriented x 3, speech clear  Skin: No rashes      Results Reviewed:  LAB RESULTS:      Lab 04/03/24  0751 04/03/24  0600 04/02/24 1918   WBC  --  8.12 10.11   HEMOGLOBIN  --  8.0* 8.8*   HEMATOCRIT  --  25.6* 27.7*   PLATELETS  --  305 329   NEUTROS ABS  --  6.06 7.26*   IMMATURE GRANS (ABS)  --  0.03 0.03   LYMPHS ABS  --  1.09 1.57   MONOS ABS  --  0.69 0.96*   EOS ABS  --  0.18 0.22   MCV  --  91.1 90.5   PROCALCITONIN  --   --  0.18   PROTIME 14.6*  --   --          Lab 04/03/24  0600 04/02/24 1918   SODIUM 142 145   POTASSIUM 5.0 4.5   CHLORIDE 107 108*   CO2 19.0* 20.0*   ANION GAP 16.0* 17.0*   BUN 67* 66*   CREATININE 8.71* 8.85*   EGFR 6.5* 6.4*   GLUCOSE 136* 78   CALCIUM 8.2* 8.6   MAGNESIUM 2.5 2.5   HEMOGLOBIN A1C  --  5.90*         Lab 04/03/24  0600 04/02/24 1918   TOTAL PROTEIN 5.4* 6.5   ALBUMIN 3.4* 3.6   GLOBULIN 2.0 2.9   ALT (SGPT) 9 9   AST (SGOT) 14 16   BILIRUBIN 0.2 0.2   ALK PHOS 89 88         Lab 04/03/24  0751   PROTIME 14.6*   INR 1.12                 Brief Urine Lab Results  (Last result in the past 365 days)        Color   Clarity   Blood   Leuk Est   Nitrite   Protein   CREAT   Urine HCG        08/03/23 1002             56.4                 Microbiology Results Abnormal       None            XR Chest 1 View    Result Date: 4/2/2024  XR CHEST 1 VW Date of Exam: 4/2/2024 10:56 PM EDT Indication: Dyspnea, FVO Comparison: 8/10/2023 Findings: There is dense and extensive airspace disease bilaterally, involving much of the left mid and lower lung, and right lower lung. There is probably mild upper lung disease as well. Airspace opacity partially silhouettes the left hemidiaphragm and right heart shadow. There is minimal if any effusion. The heart shadow  itself is partially obscured by the extensive airspace disease, but now appears mildly enlarged, increased from prior study. The pulmonary vasculature, where not obscured by pulmonary disease, appears cephalized. No pneumothorax is seen. Bony structures appear intact.     Impression: Impression: Interval development of extensive bilateral airspace disease. Interval heart shadow enlargement and new pulmonary vascular congestion. Findings could all reflect volume overload with pulmonary alveolar edema, but a superimposed bilateral pneumonia should  be considered as well. Electronically Signed: Marcos Greenfield MD  4/2/2024 11:15 PM EDT  Workstation ID: [id number]         Current medications:  Scheduled Meds:amLODIPine, 10 mg, Oral, Daily  atorvastatin, 10 mg, Oral, Nightly  bumetanide, 2 mg, Oral, BID  carvedilol, 3.125 mg, Oral, BID With Meals  heparin (porcine), 5,000 Units, Subcutaneous, Q12H  hydrALAZINE, 25 mg, Oral, TID  insulin lispro, 2-9 Units, Subcutaneous, 4x Daily AC & at Bedtime  sodium chloride, 10 mL, Intravenous, Q12H      Continuous Infusions:   PRN Meds:.  acetaminophen    senna-docusate sodium **AND** polyethylene glycol **AND** bisacodyl **AND** bisacodyl    dextrose    dextrose    glucagon (human recombinant)    hydrALAZINE    nitroglycerin    ondansetron    sodium chloride    sodium chloride    Assessment & Plan   Assessment & Plan     Active Hospital Problems    Diagnosis  POA    **ESRD needing dialysis [N18.6, Z99.2]  Yes      Resolved Hospital Problems   No resolved problems to display.        Brief Hospital Course to date:  Amrita Corbin See II is a 58 y.o. male with history of hypertension,, DM2 CKD sent by nephrology for uremia and need to initiate HD.    CKD with progression to ESRD  -Nephrology consulted, first HD planned after catheter placement  -IR consulted for dialysis access  -Has outpatient appointment next Tuesday, 4/9 with Dr. Allen for fistula eval  -Case management consulted for  outpatient dialysis planning      DM2  -Last A1c 5.9  -Continue SSI    Hypertension  -dc cardizem as he is also taking amlodipine and add carvedilol as replacement with hold parameters for HR    Dyslipidemia  -continue statin    MOY      Expected Discharge Location and Transportation: home  Expected Discharge   Expected Discharge Date: 4/6/2024; Expected Discharge Time:      DVT prophylaxis:  Medical and mechanical DVT prophylaxis orders are present.         AM-PAC 6 Clicks Score (PT): 21 (04/03/24 1009)    CODE STATUS:   Code Status and Medical Interventions:   Ordered at: 04/02/24 1915     Level Of Support Discussed With:    Patient     Code Status (Patient has no pulse and is not breathing):    CPR (Attempt to Resuscitate)     Medical Interventions (Patient has pulse or is breathing):    Full Support       Yasmine Mckinnon DO  04/03/24        Electronically signed by Yasmine Mckinnon DO at 04/03/24 1208       Consult Notes (last 24 hours)  Notes from 04/02/24 1246 through 04/03/24 1246   No notes of this type exist for this encounter.

## 2024-04-03 NOTE — CONSULTS
Patient Care Team:  Larissa Alexandre APRN as PCP - General (Nurse Practitioner)  Toñito Almazan MD as Cardiologist (Cardiology)    Chief complaint: CKD stage V    History of Present Illness: Mr Marcio Crowe is a 59 yo gentleman with past medical hx of poorly controlled DM, HTN, CKD stage V, Nephrotic range proteinuria. He was admitted from clinic for worsening fatigue, sob and LE edema. Patient is well known to [facility] service Started seeing us in 2023 for advance CKD thought to be due to diabetic nephropathy. Patient decided to do home hemodialysis and was suppose to see Dr Allen on April 9th for vascular access creation. He was admitted overnight to start urgent HD for solute clearance and optimization of volume status.     Review of Systems   Constitutional:  Positive for fatigue.   HENT: Negative.     Respiratory:  Positive for shortness of breath.    Cardiovascular:  Positive for leg swelling.   Gastrointestinal: Negative.    Genitourinary: Negative.    Musculoskeletal: Negative.    Skin: Negative.    Neurological: Negative.    Hematological: Negative.    Psychiatric/Behavioral: Negative.          Past Medical History:   Diagnosis Date    Bronchitis     Elevated cholesterol     Hyperlipidemia     Hypertension     Sleep apnea 10/2023    Type 2 diabetes mellitus    ,   Past Surgical History:   Procedure Laterality Date    ROTATOR CUFF REPAIR Left    ,   Family History   Problem Relation Age of Onset    Hypertension Mother     Lung disease Mother     Heart disease Mother     Hypertension Father     Diabetes Father     Kidney disease Father     Heart attack Father     Stroke Maternal Grandmother     Stroke Paternal Grandmother    ,   Social History     Socioeconomic History    Marital status:    Tobacco Use    Smoking status: Never     Passive exposure: Past    Smokeless tobacco: Never   Vaping Use    Vaping status: Never Used   Substance and Sexual Activity    Alcohol use: Yes     Comment: RARE    Drug use: No     Sexual activity: Defer     E-cigarette/Vaping    E-cigarette/Vaping Use Never User     Passive Exposure No      E-cigarette/Vaping Substances    Nicotine No     THC No     CBD No     Flavoring No      E-cigarette/Vaping Devices    Disposable No     Pre-filled or Refillable Cartridge No     Refillable Tank No     Pre-filled Pod No          ,   Medications Prior to Admission   Medication Sig Dispense Refill Last Dose    amLODIPine (NORVASC) 10 MG tablet Take 1 tablet by mouth Daily.   4/2/2024 at 0900    atorvastatin (LIPITOR) 10 MG tablet Take 1 tablet by mouth Daily.   4/2/2024 at 0900    Azelastine HCl 137 MCG/SPRAY solution 1 spray by Each Nare route Daily.   Patient Taking Differently at 0900    bumetanide (BUMEX) 2 MG tablet Take 1 tablet by mouth 2 (Two) Times a Day.   Patient Taking Differently at 0900    calcitriol (ROCALTROL) 0.25 MCG capsule Take 1 capsule by mouth Daily.   4/2/2024 at 0900    dilTIAZem CD (Cardizem CD) 240 MG 24 hr capsule Take 1 capsule by mouth Daily. 90 capsule 3 4/2/2024 at 0900    hydrALAZINE (APRESOLINE) 25 MG tablet Take 1 tablet by mouth 3 (Three) Times a Day.   Patient Taking Differently    Insulin Lispro, 1 Unit Dial, (HUMALOG) 100 UNIT/ML solution pen-injector    4/2/2024    isosorbide mononitrate (IMDUR) 60 MG 24 hr tablet Take 1 tablet by mouth.   4/1/2024 at 2100    Lantus SoloStar 100 UNIT/ML injection pen    4/2/2024    levocetirizine (XYZAL) 5 MG tablet Take 1 tablet by mouth Every Evening.   4/1/2024 at 2100    sodium bicarbonate 650 MG tablet 2 (two) tablet by mouth two times daily   4/2/2024 at 0900    sodium zirconium cyclosilicate (Lokelma) 10 g pack Take 10 g by mouth.   4/1/2024 at 0900    vitamin D (ERGOCALCIFEROL) 1.25 MG (90667 UT) capsule capsule    Past Week at 0900    albuterol sulfate  (90 Base) MCG/ACT inhaler Inhale 2 puffs Every 4 (Four) Hours As Needed for Wheezing. 6.7 g 0 More than a month    BD Pen Needle Vidhya 2nd Gen 32G X 4 MM misc USE ONCE  DAILY WITH VICTOZA 50 each 0     Continuous Blood Gluc Sensor (FreeStyle Margo 2 Sensor) misc        guaiFENesin-codeine (GUAIFENESIN AC) 100-10 MG/5ML liquid Take 5 mL by mouth 3 (Three) Times a Day As Needed for Cough. 118 mL 0 More than a month    ipratropium (ATROVENT) 0.03 % nasal spray 2 sprays into the nostril(s) as directed by provider Every 12 (Twelve) Hours. 1 each 11     OneTouch Verio test strip USE TO TEST THREE TIMES DAILY AS DIRECTED       Semaglutide,0.25 or 0.5MG/DOS, (OZEMPIC) 2 MG/3ML solution pen-injector Inject 0.25 mg under the skin into the appropriate area as directed. (Patient not taking: Reported on 3/19/2024)      , and Scheduled Meds:  amLODIPine, 10 mg, Oral, Daily  atorvastatin, 10 mg, Oral, Nightly  bumetanide, 2 mg, Oral, BID  carvedilol, 3.125 mg, Oral, BID With Meals  heparin (porcine), 5,000 Units, Subcutaneous, Q12H  hydrALAZINE, 25 mg, Oral, TID  insulin lispro, 2-9 Units, Subcutaneous, 4x Daily AC & at Bedtime  sodium chloride, 10 mL, Intravenous, Q12H       Objective     Vital Signs  Temp:  [97.5 °F (36.4 °C)-97.9 °F (36.6 °C)] 97.5 °F (36.4 °C)  Heart Rate:  [61-78] 64  Resp:  [15-22] 15  BP: (141-195)/(59-90) 149/59    No intake/output data recorded.  I/O last 3 completed shifts:  In: 240 [P.O.:240]  Out: 875 [Urine:875]    Physical Exam  Constitutional:       General: He is not in acute distress.     Appearance: Normal appearance. He is not ill-appearing.   HENT:      Head: Normocephalic.      Nose: Nose normal.      Mouth/Throat:      Mouth: Mucous membranes are moist.   Eyes:      Pupils: Pupils are equal, round, and reactive to light.   Cardiovascular:      Rate and Rhythm: Normal rate and regular rhythm.   Pulmonary:      Effort: Pulmonary effort is normal.      Breath sounds: Normal breath sounds.   Abdominal:      General: Abdomen is flat.   Musculoskeletal:      Cervical back: Normal range of motion.      Right lower leg: Edema present.      Left lower leg: Edema  "present.   Skin:     General: Skin is warm.   Neurological:      General: No focal deficit present.      Mental Status: He is alert and oriented to person, place, and time. Mental status is at baseline.         Results Review:    I reviewed the patient's new clinical results.    WBC WBC   Date Value Ref Range Status   04/03/2024 8.12 3.40 - 10.80 10*3/mm3 Final   04/02/2024 10.11 3.40 - 10.80 10*3/mm3 Final      HGB Hemoglobin   Date Value Ref Range Status   04/03/2024 8.0 (L) 13.0 - 17.7 g/dL Final   04/02/2024 8.8 (L) 13.0 - 17.7 g/dL Final      HCT Hematocrit   Date Value Ref Range Status   04/03/2024 25.6 (L) 37.5 - 51.0 % Final   04/02/2024 27.7 (L) 37.5 - 51.0 % Final      Platlets No results found for: \"LABPLAT\"   MCV MCV   Date Value Ref Range Status   04/03/2024 91.1 79.0 - 97.0 fL Final   04/02/2024 90.5 79.0 - 97.0 fL Final          Sodium Sodium   Date Value Ref Range Status   04/03/2024 142 136 - 145 mmol/L Final   04/02/2024 145 136 - 145 mmol/L Final      Potassium Potassium   Date Value Ref Range Status   04/03/2024 5.0 3.5 - 5.2 mmol/L Final   04/02/2024 4.5 3.5 - 5.2 mmol/L Final      Chloride Chloride   Date Value Ref Range Status   04/03/2024 107 98 - 107 mmol/L Final   04/02/2024 108 (H) 98 - 107 mmol/L Final      CO2 CO2   Date Value Ref Range Status   04/03/2024 19.0 (L) 22.0 - 29.0 mmol/L Final   04/02/2024 20.0 (L) 22.0 - 29.0 mmol/L Final      BUN BUN   Date Value Ref Range Status   04/03/2024 67 (H) 6 - 20 mg/dL Final   04/02/2024 66 (H) 6 - 20 mg/dL Final      Creatinine Creatinine   Date Value Ref Range Status   04/03/2024 8.71 (H) 0.76 - 1.27 mg/dL Final   04/02/2024 8.85 (H) 0.76 - 1.27 mg/dL Final      Calcium Calcium   Date Value Ref Range Status   04/03/2024 8.2 (L) 8.6 - 10.5 mg/dL Final   04/02/2024 8.6 8.6 - 10.5 mg/dL Final      PO4 No results found for: \"CAPO4\"   Albumin Albumin   Date Value Ref Range Status   04/03/2024 3.4 (L) 3.5 - 5.2 g/dL Final   04/02/2024 3.6 3.5 - 5.2 " "g/dL Final      Magnesium Magnesium   Date Value Ref Range Status   04/03/2024 2.5 1.6 - 2.6 mg/dL Final   04/02/2024 2.5 1.6 - 2.6 mg/dL Final      Uric Acid No results found for: \"URICACID\"         Assessment & Plan       ESRD needing dialysis      Assessment & Plan    CKD stage V: Etiology diabetic nephropathy. Started on iHD for optimization of volume status on this admission 4/3/24 through [facility].     Volume status: 2+ edema b/l LE. Optimization with HD. Continue with diuretics    Met acidosis: Due to advance CKD     Anemia: ACD due to CKD    DM: Hx of poorly controlled DM and diabetic nephropathy with proteinuria.     Recs  Seen on HD tolerating first treatment well so far. 2 hr treatment UF 1.5 liter. Plan for another treatment tomorrow 2.5 hr.   DARNELL on HD days.  Continue with diuretics.   Continue with antiHTN meds.     I discussed the patients findings and my recommendations with patient    Suraj Victoria MD  04/03/24  16:04 EDT            "

## 2024-04-03 NOTE — INTERVAL H&P NOTE
H&P reviewed. The patient was examined and there are no changes to the H&P.      Tunnel dialysis catheter placement.

## 2024-04-03 NOTE — PLAN OF CARE
Goal Outcome Evaluation:  Plan of Care Reviewed With: patient           Outcome Evaluation: PT initial Eval completed.  Pt presents with generalized weakness, balance deficits, decreased functional endurance, and decreased indep with functional mobility compared to baseline level of function.  Ambulated 40ft with CGA and no AD.  O2 sats decreased to 88% on RA following ambulation; recovered to >90% w/in ~30 sec with seated rest and focus on PLB.  Pt will benefit from skilled IP PT to improve indep with mobility and promote return to PLOF.  Rec HWA and OP PT upon d/c.      Anticipated Discharge Disposition (PT): home with assist, home with outpatient therapy services

## 2024-04-03 NOTE — PLAN OF CARE
Pt hypertensive in 190s SBP at beginning of shift. PRN and scheduled BP meds reduced SBPs into the 160s. Respiratory and provider notified dt pt O2 demand increasing to 6L NC. Pt home CPAP w/O2 applied and pt is now 97% while sleeping. Pt appears to be resting comfortably in chair.

## 2024-04-03 NOTE — PLAN OF CARE
Hemodialysis treatments initiated.  Problem: Device-Related Complication Risk (Hemodialysis)  Goal: Safe, Effective Therapy Delivery  Outcome: Ongoing, Progressing       Problem: Device-Related Complication Risk (Hemodialysis)  Goal: Safe, Effective Therapy Delivery  Outcome: Ongoing, Progressing   Goal Outcome Evaluation:

## 2024-04-03 NOTE — CASE MANAGEMENT/SOCIAL WORK
Discharge Planning Assessment  [facility]     Patient Name: Amrita Corbin See II  MRN: [id number]  Today's Date: 4/3/2024    Admit Date: 4/2/2024    Plan: IDP, d/c goal is home   Discharge Needs Assessment       Row Name 04/03/24 1129       Living Environment    People in Home spouse    Current Living Arrangements home    Potentially Unsafe Housing Conditions none    Primary Care Provided by self;spouse/significant other    Provides Primary Care For no one    Family Caregiver if Needed spouse    Family Caregiver Names Bitzi spouse    Quality of Family Relationships helpful;involved;supportive    Living Arrangement Comments Lives in [place] w/spouse in a one level home, 3 steps to enter       Transition Planning    Patient/Family Anticipates Transition to home with family    Patient/Family Anticipated Services at Transition     Transportation Anticipated family or friend will provide       Discharge Needs Assessment    Equipment Currently Used at Home cpap  Margo                   Discharge Plan       Row Name 04/03/24 1131       Plan    Plan IDP, d/c goal is home    Patient/Family in Agreement with Plan yes    Plan Comments I met w/Mr. See and spouse in room to initiate IDP, d/c plan. Insurance of [employer] confirmed. Fills scripts @ [facility] in [place] w/no issues obtaining medications. Has Cpap and Margo @ home. Denies other DME, HH home O2. Per patient, plan is to have dialysis access placed and initiate HD here, then have outpatient HD. He stated that he has been discussing this w/his nephrologist and [facility] on [address]. PT rec home w/outpatient PT today. CM will continue to follow.    Final Discharge Disposition Code 01 - home or self-care                  Continued Care and Services - Admitted Since 4/2/2024    No active coordination exists for this encounter.       Expected Discharge Date and Time       Expected Discharge Date Expected Discharge Time    Apr 8, 2024             Demographic Summary       Row Name 04/03/24 1127       General Information    Arrived From physician office - external    Referral Source emergency department    Preferred Language English    General Information Comments PCP is Rosa M Alexandre. No POA/LW paperwork on file,                   Functional Status       Row Name 04/03/24 1129       Functional Status    Usual Activity Tolerance good    Current Activity Tolerance moderate       Functional Status, IADL    Medications independent    Meal Preparation independent    Housekeeping independent    Laundry independent    Shopping independent       Employment/    Employment Status employed full-time                   Psychosocial    No documentation.                  Abuse/Neglect    No documentation.                  Legal    No documentation.                  Substance Abuse    No documentation.                  Patient Forms    No documentation.                     Donovan Sims RN

## 2024-04-03 NOTE — H&P (VIEW-ONLY)
[facility] Medicine Services  PROGRESS NOTE    Patient Name: Amrita Corbin See II  : 1965  MRN: [id number]    Date of Admission: 2024  Primary Care Physician: Larissa Alexandre APRN    Subjective   Subjective     CC:  Progression of CKD    HPI:  Feeling okay, short of breath.  Has outpatient follow-up with Dr. Allen next Tuesday that was previously scheduled prior to admission      Objective   Objective     Vital Signs:   Temp:  [97.5 °F (36.4 °C)-97.9 °F (36.6 °C)] 97.5 °F (36.4 °C)  Heart Rate:  [61-77] (P) 66  Resp:  [18] 18  BP: (141-195)/(67-90) (P) 159/78  Flow (L/min):  [2-7] (P) 2     Physical Exam:  Constitutional: No acute distress, awake, alert  HENT: NCAT, mucous membranes moist  Respiratory: Respiratory effort normal   Cardiovascular: RRR on tele  Gastrointestinal: obese  Musculoskeletal: LE edema  Psychiatric: Appropriate affect, cooperative  Neurologic: Oriented x 3, speech clear  Skin: No rashes      Results Reviewed:  LAB RESULTS:      Lab 24  0751 24  0600 24  191   WBC  --  8.12 10.11   HEMOGLOBIN  --  8.0* 8.8*   HEMATOCRIT  --  25.6* 27.7*   PLATELETS  --  305 329   NEUTROS ABS  --  6.06 7.26*   IMMATURE GRANS (ABS)  --  0.03 0.03   LYMPHS ABS  --  1.09 1.57   MONOS ABS  --  0.69 0.96*   EOS ABS  --  0.18 0.22   MCV  --  91.1 90.5   PROCALCITONIN  --   --  0.18   PROTIME 14.6*  --   --          Lab 24  0600 24   SODIUM 142 145   POTASSIUM 5.0 4.5   CHLORIDE 107 108*   CO2 19.0* 20.0*   ANION GAP 16.0* 17.0*   BUN 67* 66*   CREATININE 8.71* 8.85*   EGFR 6.5* 6.4*   GLUCOSE 136* 78   CALCIUM 8.2* 8.6   MAGNESIUM 2.5 2.5   HEMOGLOBIN A1C  --  5.90*         Lab 24  0600 24  1918   TOTAL PROTEIN 5.4* 6.5   ALBUMIN 3.4* 3.6   GLOBULIN 2.0 2.9   ALT (SGPT) 9 9   AST (SGOT) 14 16   BILIRUBIN 0.2 0.2   ALK PHOS 89 88         Lab 24  0751   PROTIME 14.6*   INR 1.12                 Brief Urine Lab Results  (Last result  in the past 365 days)        Color   Clarity   Blood   Leuk Est   Nitrite   Protein   CREAT   Urine HCG        08/03/23 1002             56.4                 Microbiology Results Abnormal       None            XR Chest 1 View    Result Date: 4/2/2024  XR CHEST 1 VW Date of Exam: 4/2/2024 10:56 PM EDT Indication: Dyspnea, FVO Comparison: 8/10/2023 Findings: There is dense and extensive airspace disease bilaterally, involving much of the left mid and lower lung, and right lower lung. There is probably mild upper lung disease as well. Airspace opacity partially silhouettes the left hemidiaphragm and right heart shadow. There is minimal if any effusion. The heart shadow itself is partially obscured by the extensive airspace disease, but now appears mildly enlarged, increased from prior study. The pulmonary vasculature, where not obscured by pulmonary disease, appears cephalized. No pneumothorax is seen. Bony structures appear intact.     Impression: Impression: Interval development of extensive bilateral airspace disease. Interval heart shadow enlargement and new pulmonary vascular congestion. Findings could all reflect volume overload with pulmonary alveolar edema, but a superimposed bilateral pneumonia should  be considered as well. Electronically Signed: Marcos Greenfield MD  4/2/2024 11:15 PM EDT  Workstation ID: [id number]         Current medications:  Scheduled Meds:amLODIPine, 10 mg, Oral, Daily  atorvastatin, 10 mg, Oral, Nightly  bumetanide, 2 mg, Oral, BID  carvedilol, 3.125 mg, Oral, BID With Meals  heparin (porcine), 5,000 Units, Subcutaneous, Q12H  hydrALAZINE, 25 mg, Oral, TID  insulin lispro, 2-9 Units, Subcutaneous, 4x Daily AC & at Bedtime  sodium chloride, 10 mL, Intravenous, Q12H      Continuous Infusions:   PRN Meds:.  acetaminophen    senna-docusate sodium **AND** polyethylene glycol **AND** bisacodyl **AND** bisacodyl    dextrose    dextrose    glucagon (human recombinant)    hydrALAZINE    nitroglycerin     ondansetron    sodium chloride    sodium chloride    Assessment & Plan   Assessment & Plan     Active Hospital Problems    Diagnosis  POA    **ESRD needing dialysis [N18.6, Z99.2]  Yes      Resolved Hospital Problems   No resolved problems to display.        Brief Hospital Course to date:  Amrita Buckley II is a 58 y.o. male with history of hypertension,, DM2 CKD sent by nephrology for uremia and need to initiate HD.    CKD with progression to ESRD  -Nephrology consulted, first HD planned after catheter placement  -IR consulted for dialysis access  -Has outpatient appointment next Tuesday, 4/9 with Dr. Allen for fistula eval  -Case management consulted for outpatient dialysis planning      DM2  -Last A1c 5.9  -Continue SSI    Hypertension  -dc cardizem as he is also taking amlodipine and add carvedilol as replacement with hold parameters for HR    Dyslipidemia  -continue statin    MOY      Expected Discharge Location and Transportation: home  Expected Discharge   Expected Discharge Date: 4/6/2024; Expected Discharge Time:      DVT prophylaxis:  Medical and mechanical DVT prophylaxis orders are present.         AM-PAC 6 Clicks Score (PT): 21 (04/03/24 1009)    CODE STATUS:   Code Status and Medical Interventions:   Ordered at: 04/02/24 5911     Level Of Support Discussed With:    Patient     Code Status (Patient has no pulse and is not breathing):    CPR (Attempt to Resuscitate)     Medical Interventions (Patient has pulse or is breathing):    Full Support       Yasmine Mckinnon, DO  04/03/24

## 2024-04-03 NOTE — NURSING NOTE
15.5 Kiswahili Image guided tunneled dialysis catheter placed to right internal jugular by Dr. Vickie MD. Patient tolerated well. Sedation time of 13 minutes. Patient was given 50 mcg Fentanyl & 1 mg Versed. Report called to SANTANA Enciso and given to dialysis nurse SANTANA Amezquita.

## 2024-04-03 NOTE — PROGRESS NOTES
[facility] Medicine Services  PROGRESS NOTE    Patient Name: Amrita Corbin See II  : 1965  MRN: [id number]    Date of Admission: 2024  Primary Care Physician: Larissa Alexandre APRN    Subjective   Subjective     CC:  Progression of CKD    HPI:  Feeling okay, short of breath.  Has outpatient follow-up with Dr. Allen next Tuesday that was previously scheduled prior to admission      Objective   Objective     Vital Signs:   Temp:  [97.5 °F (36.4 °C)-97.9 °F (36.6 °C)] 97.5 °F (36.4 °C)  Heart Rate:  [61-77] (P) 66  Resp:  [18] 18  BP: (141-195)/(67-90) (P) 159/78  Flow (L/min):  [2-7] (P) 2     Physical Exam:  Constitutional: No acute distress, awake, alert  HENT: NCAT, mucous membranes moist  Respiratory: Respiratory effort normal   Cardiovascular: RRR on tele  Gastrointestinal: obese  Musculoskeletal: LE edema  Psychiatric: Appropriate affect, cooperative  Neurologic: Oriented x 3, speech clear  Skin: No rashes      Results Reviewed:  LAB RESULTS:      Lab 24  0751 24  0600 24  191   WBC  --  8.12 10.11   HEMOGLOBIN  --  8.0* 8.8*   HEMATOCRIT  --  25.6* 27.7*   PLATELETS  --  305 329   NEUTROS ABS  --  6.06 7.26*   IMMATURE GRANS (ABS)  --  0.03 0.03   LYMPHS ABS  --  1.09 1.57   MONOS ABS  --  0.69 0.96*   EOS ABS  --  0.18 0.22   MCV  --  91.1 90.5   PROCALCITONIN  --   --  0.18   PROTIME 14.6*  --   --          Lab 24  0600 24   SODIUM 142 145   POTASSIUM 5.0 4.5   CHLORIDE 107 108*   CO2 19.0* 20.0*   ANION GAP 16.0* 17.0*   BUN 67* 66*   CREATININE 8.71* 8.85*   EGFR 6.5* 6.4*   GLUCOSE 136* 78   CALCIUM 8.2* 8.6   MAGNESIUM 2.5 2.5   HEMOGLOBIN A1C  --  5.90*         Lab 24  0600 24  1918   TOTAL PROTEIN 5.4* 6.5   ALBUMIN 3.4* 3.6   GLOBULIN 2.0 2.9   ALT (SGPT) 9 9   AST (SGOT) 14 16   BILIRUBIN 0.2 0.2   ALK PHOS 89 88         Lab 24  0751   PROTIME 14.6*   INR 1.12                 Brief Urine Lab Results  (Last result  in the past 365 days)        Color   Clarity   Blood   Leuk Est   Nitrite   Protein   CREAT   Urine HCG        08/03/23 1002             56.4                 Microbiology Results Abnormal       None            XR Chest 1 View    Result Date: 4/2/2024  XR CHEST 1 VW Date of Exam: 4/2/2024 10:56 PM EDT Indication: Dyspnea, FVO Comparison: 8/10/2023 Findings: There is dense and extensive airspace disease bilaterally, involving much of the left mid and lower lung, and right lower lung. There is probably mild upper lung disease as well. Airspace opacity partially silhouettes the left hemidiaphragm and right heart shadow. There is minimal if any effusion. The heart shadow itself is partially obscured by the extensive airspace disease, but now appears mildly enlarged, increased from prior study. The pulmonary vasculature, where not obscured by pulmonary disease, appears cephalized. No pneumothorax is seen. Bony structures appear intact.     Impression: Impression: Interval development of extensive bilateral airspace disease. Interval heart shadow enlargement and new pulmonary vascular congestion. Findings could all reflect volume overload with pulmonary alveolar edema, but a superimposed bilateral pneumonia should  be considered as well. Electronically Signed: Marcos Greenfield MD  4/2/2024 11:15 PM EDT  Workstation ID: [id number]         Current medications:  Scheduled Meds:amLODIPine, 10 mg, Oral, Daily  atorvastatin, 10 mg, Oral, Nightly  bumetanide, 2 mg, Oral, BID  carvedilol, 3.125 mg, Oral, BID With Meals  heparin (porcine), 5,000 Units, Subcutaneous, Q12H  hydrALAZINE, 25 mg, Oral, TID  insulin lispro, 2-9 Units, Subcutaneous, 4x Daily AC & at Bedtime  sodium chloride, 10 mL, Intravenous, Q12H      Continuous Infusions:   PRN Meds:.  acetaminophen    senna-docusate sodium **AND** polyethylene glycol **AND** bisacodyl **AND** bisacodyl    dextrose    dextrose    glucagon (human recombinant)    hydrALAZINE    nitroglycerin     ondansetron    sodium chloride    sodium chloride    Assessment & Plan   Assessment & Plan     Active Hospital Problems    Diagnosis  POA    **ESRD needing dialysis [N18.6, Z99.2]  Yes      Resolved Hospital Problems   No resolved problems to display.        Brief Hospital Course to date:  Amrita Buckley II is a 58 y.o. male with history of hypertension,, DM2 CKD sent by nephrology for uremia and need to initiate HD.    CKD with progression to ESRD  -Nephrology consulted, first HD planned after catheter placement  -IR consulted for dialysis access  -Has outpatient appointment next Tuesday, 4/9 with Dr. Allen for fistula eval  -Case management consulted for outpatient dialysis planning      DM2  -Last A1c 5.9  -Continue SSI    Hypertension  -dc cardizem as he is also taking amlodipine and add carvedilol as replacement with hold parameters for HR    Dyslipidemia  -continue statin    MOY      Expected Discharge Location and Transportation: home  Expected Discharge   Expected Discharge Date: 4/6/2024; Expected Discharge Time:      DVT prophylaxis:  Medical and mechanical DVT prophylaxis orders are present.         AM-PAC 6 Clicks Score (PT): 21 (04/03/24 1009)    CODE STATUS:   Code Status and Medical Interventions:   Ordered at: 04/02/24 7372     Level Of Support Discussed With:    Patient     Code Status (Patient has no pulse and is not breathing):    CPR (Attempt to Resuscitate)     Medical Interventions (Patient has pulse or is breathing):    Full Support       Yasmine Mckinnon, DO  04/03/24

## 2024-04-03 NOTE — THERAPY EVALUATION
Patient Name: Amrita Corbin See II  : 1965    MRN: [id number]                              Today's Date: 4/3/2024       Admit Date: 2024    Visit Dx: No diagnosis found.  Patient Active Problem List   Diagnosis    HTN (hypertension)    Hyperlipidemia    Noncompliance    Stage 3 chronic kidney disease    Uncontrolled type 2 diabetes mellitus with hyperglycemia    Hypertensive heart disease with chronic diastolic congestive heart failure    Multiple pulmonary nodules (Incidental LLL)    Mediastinal lymphadenopathy (10mm incidental)    Non-smoker    Obesity (BMI 30-39.9)    Iron deficiency anemia, unspecified    ESRD needing dialysis     Past Medical History:   Diagnosis Date    Bronchitis     Elevated cholesterol     Hyperlipidemia     Hypertension     Sleep apnea 10/2023    Type 2 diabetes mellitus      Past Surgical History:   Procedure Laterality Date    ROTATOR CUFF REPAIR Left       General Information       Row Name 24 0944          Physical Therapy Time and Intention    Document Type evaluation  -     Mode of Treatment physical therapy  -       Row Name 24 0944          General Information    Patient Profile Reviewed yes  -BA     Prior Level of Function independent:;all household mobility;community mobility;gait;transfer;bed mobility;ADL's;using stairs;driving;work  Reported he did not use an AD for ambulation. Fxl decline in the past ~month w/ ambulation difficulty; has been using an electric scooter at the grocery for longer distance mobility. No falls. No O2 at home. Hasn't worked since having COVID mid 2024.  -BA     Existing Precautions/Restrictions fall;oxygen therapy device and L/min;other (see comments)  BLE edema; monitor vitals  -BA     Barriers to Rehab medically complex  -BA       Row Name 24 0944          Living Environment    People in Home spouse  -BA       Row Name 24 0944          Home Main Entrance    Number of Stairs, Main Entrance four  -BA     Stair  Railings, Main Entrance none  -       Row Name 04/03/24 0944          Stairs Within Home, Primary    Number of Stairs, Within Home, Primary none  -BA       Row Name 04/03/24 0944          Cognition    Orientation Status (Cognition) oriented x 3  -BA       Row Name 04/03/24 0944          Safety Issues, Functional Mobility    Safety Issues Affecting Function (Mobility) awareness of need for assistance;insight into deficits/self-awareness;safety precaution awareness;safety precautions follow-through/compliance  -     Impairments Affecting Function (Mobility) endurance/activity tolerance;shortness of breath;strength;balance;range of motion (ROM)  -               User Key  (r) = Recorded By, (t) = Taken By, (c) = Cosigned By      Initials Name Provider Type     Magda Manley, PT Physical Therapist                   Mobility       Row Name 04/03/24 0954          Bed Mobility    Comment, (Bed Mobility) Received [facility] upon arrival and returned to chair.  -       Row Name 04/03/24 0954          Sit-Stand Transfer    Sit-Stand [place] (Transfers) standby assist  -       Row Name 04/03/24 0954          Gait/Stairs (Locomotion)    [place] Level (Gait) contact guard;verbal cues  -     Distance in Feet (Gait) 40  -BA     Deviations/Abnormal Patterns (Gait) bilateral deviations;stefani decreased;gait speed decreased;stride length decreased;base of support, wide  -     Bilateral Gait Deviations heel strike decreased;forward flexed posture  -     Comment, (Gait/Stairs) Demo'd step through gait pattern.  Mild DE LEON/SOA near end of ambulation distance.  O2 sats decreased to 88% on RA following ambulation; recovered to >90% w/in ~30 sec with seated rest and focus on PLB.  VCs for improved stride length, improved heel strike upon IC, and PLB.  Gait distance limited by fatigue and DE LEON/SOA.  -               User Key  (r) = Recorded By, (t) = Taken By, (c) = Cosigned By      Initials Name Provider Type    ELI  Magda Manley, PT Physical Therapist                   Obj/Interventions       Row Name 04/03/24 0959          Range of Motion Comprehensive    General Range of Motion lower extremity range of motion deficits identified;bilateral lower extremity ROM WFL  -     Comment, General Range of Motion AROM B hip and knee WFL.  Mildly decreased AROM B ankle DF to ~neutral d/t increased BLE edema.  -       Row Name 04/03/24 0959          Strength Comprehensive (MMT)    General Manual Muscle Testing (MMT) Assessment lower extremity strength deficits identified  -     Comment, General Manual Muscle Testing (MMT) Assessment B hip grossly 4-/5, B knee grossly 4/5, B ankle grossly 4-/5.  -       Row Name 04/03/24 0959          Motor Skills    Motor Skills functional endurance  -     Functional Endurance Decreased functional endurance.  Easily fatigues with activity with DE LEON/SOA and decreased O2 sats on RA.  Able to recover with seated rest and PLB.  -       Row Name 04/03/24 0959          Balance    Balance Assessment sitting static balance;sitting dynamic balance;sit to stand dynamic balance;standing static balance;standing dynamic balance  -     Static Sitting Balance independent  -     Dynamic Sitting Balance supervision  -     Position, Sitting Balance unsupported;sitting in chair  -     Sit to Stand Dynamic Balance standby assist  -     Static Standing Balance standby assist  -     Dynamic Standing Balance contact guard  -     Position/Device Used, Standing Balance unsupported  -     Comment, Balance Fairly steady gait overall with intermittent mild instability with no AD; no overt LOB noted.  -       Row Name 04/03/24 0959          Sensory Assessment (Somatosensory)    Sensory Assessment (Somatosensory) LE sensation intact  -               User Key  (r) = Recorded By, (t) = Taken By, (c) = Cosigned By      Initials Name Provider Type     Magda Manley, PT Physical Therapist                    Goals/Plan       Row Name 04/03/24 1008          Bed Mobility Goal 1 (PT)    Activity/Assistive Device (Bed Mobility Goal 1, PT) sit to supine/supine to sit  -BA     [place] Level/Cues Needed (Bed Mobility Goal 1, PT) independent  -BA     Time Frame (Bed Mobility Goal 1, PT) long term goal (LTG);10 days  -BA       Row Name 04/03/24 1008          Transfer Goal 1 (PT)    Activity/Assistive Device (Transfer Goal 1, PT) sit-to-stand/stand-to-sit;bed-to-chair/chair-to-bed  -BA     [place] Level/Cues Needed (Transfer Goal 1, PT) independent  -BA     Time Frame (Transfer Goal 1, PT) long term goal (LTG);10 days  -       Row Name 04/03/24 1008          Gait Training Goal 1 (PT)    Activity/Assistive Device (Gait Training Goal 1, PT) gait (walking locomotion)  -BA     [place] Level (Gait Training Goal 1, PT) supervision required  -BA     Distance (Gait Training Goal 1, PT) 350  -BA     Time Frame (Gait Training Goal 1, PT) long term goal (LTG);10 days  -BA       Row Name 04/03/24 1008          Stairs Goal 1 (PT)    Activity/Assistive Device (Stairs Goal 1, PT) ascending stairs;descending stairs  -BA     [place] Level/Cues Needed (Stairs Goal 1, PT) supervision required  -BA     Number of Stairs (Stairs Goal 1, PT) 4  -BA     Time Frame (Stairs Goal 1, PT) long term goal (LTG);10 days  -       Row Name 04/03/24 1008          Therapy Assessment/Plan (PT)    Planned Therapy Interventions (PT) balance training;bed mobility training;gait training;home exercise program;patient/family education;postural re-education;ROM (range of motion);stair training;strengthening;stretching;transfer training  -BA               User Key  (r) = Recorded By, (t) = Taken By, (c) = Cosigned By      Initials Name Provider Type    Magda Patten, PT Physical Therapist                   Clinical Impression       Row Name 04/03/24 1004          Pain    Pretreatment Pain Rating 0/10 - no pain  -BA     Posttreatment  Pain Rating 0/10 - no pain  -BA       Row Name 04/03/24 1004          Plan of Care Review    Plan of Care Reviewed With patient  -BA     Outcome Evaluation PT initial Eval completed.  Pt presents with generalized weakness, balance deficits, decreased functional endurance, and decreased indep with functional mobility compared to baseline level of function.  Ambulated 40ft with CGA and no AD.  O2 sats decreased to 88% on RA following ambulation; recovered to >90% w/in ~30 sec with seated rest and focus on PLB.  Pt will benefit from skilled IP PT to improve indep with mobility and promote return to PLOF.  Rec HWA and OP PT upon d/c.  -       Row Name 04/03/24 1004          Therapy Assessment/Plan (PT)    Rehab Potential (PT) good, to achieve stated therapy goals  -     Criteria for Skilled Interventions Met (PT) yes;meets criteria;skilled treatment is necessary  -     Therapy Frequency (PT) daily  -       Row Name 04/03/24 1004          Vital Signs    Pre Systolic BP Rehab 156  -BA     Pre Treatment Diastolic BP 76  -BA     Post Systolic BP Rehab 170  -BA     Post Treatment Diastolic BP 76  -BA     Pretreatment Heart Rate (beats/min) 62  -BA     Posttreatment Heart Rate (beats/min) 67  -BA     Pre SpO2 (%) 97  -BA     O2 Delivery Pre Treatment nasal cannula  2L  -BA     Intra SpO2 (%) 88  -BA     O2 Delivery Intra Treatment room air  -BA     Post SpO2 (%) 96  -BA     O2 Delivery Post Treatment nasal cannula  2L  -BA     Pre Patient Position Sitting  -BA     Intra Patient Position Standing  -BA     Post Patient Position Sitting  -BA       Row Name 04/03/24 1004          Positioning and Restraints    Pre-Treatment Position sitting in chair/recliner  -BA     Post Treatment Position chair  -BA     In Chair notified nsg;reclined;sitting;call light within reach;encouraged to call for assist;waffle cushion;legs elevated  RN deferred chair alarm.  -BA               User Key  (r) = Recorded By, (t) = Taken By, (c) =  Cosigned By      Initials Name Provider Type    Magda Patten, PT Physical Therapist                   Outcome Measures       Row Name 04/03/24 1009 04/03/24 0800       How much help from another person do you currently need...    Turning from your back to your side while in flat bed without using bedrails? 4  -BA 4 (P)   -RR    Moving from lying on back to sitting on the side of a flat bed without bedrails? 4  -BA 4 (P)   -RR    Moving to and from a bed to a chair (including a wheelchair)? 3  -BA 4 (P)   -RR    Standing up from a chair using your arms (e.g., wheelchair, bedside chair)? 4  -BA 4 (P)   -RR    Climbing 3-5 steps with a railing? 3  -BA 3 (P)   -RR    To walk in hospital room? 3  -BA 4 (P)   -RR    AM-PAC 6 Clicks Score (PT) 21  -BA 23 (P)   -RR    Highest Level of Mobility Goal 6 --> Walk 10 steps or more  -BA 7 --> Walk 25 feet or more (P)   -RR      Row Name 04/03/24 1009          Functional Assessment    Outcome Measure Options AM-PAC 6 Clicks Basic Mobility (PT)  -BA               User Key  (r) = Recorded By, (t) = Taken By, (c) = Cosigned By      Initials Name Provider Type    Magda Patten, PT Physical Therapist    Haritha March RN Extern Registered Nurse                                 Physical Therapy Education       Title: PT OT SLP Therapies (Done)       Topic: Physical Therapy (Done)       Point: Mobility training (Done)       Learning Progress Summary             Patient Acceptance, E,D, VU,NR by ELI at 4/3/2024 1010                         Point: Home exercise program (Done)       Learning Progress Summary             Patient Acceptance, E,D, VU,NR by ELI at 4/3/2024 1010                         Point: Body mechanics (Done)       Learning Progress Summary             Patient Acceptance, E,D, VU,NR by ELI at 4/3/2024 1010                         Point: Precautions (Done)       Learning Progress Summary             Patient Acceptance, E,D, VU,NR by ELI at 4/3/2024 1010                                          User Key       Initials Effective Dates Name Provider Type Discipline     09/21/21 -  Magda Manley, PT Physical Therapist PT                  PT Recommendation and Plan  Planned Therapy Interventions (PT): balance training, bed mobility training, gait training, home exercise program, patient/family education, postural re-education, ROM (range of motion), stair training, strengthening, stretching, transfer training  Plan of Care Reviewed With: patient  Outcome Evaluation: PT initial Eval completed.  Pt presents with generalized weakness, balance deficits, decreased functional endurance, and decreased indep with functional mobility compared to baseline level of function.  Ambulated 40ft with CGA and no AD.  O2 sats decreased to 88% on RA following ambulation; recovered to >90% w/in ~30 sec with seated rest and focus on PLB.  Pt will benefit from skilled IP PT to improve indep with mobility and promote return to PLOF.  Rec HWA and OP PT upon d/c.     Time Calculation:   PT Evaluation Complexity  History, PT Evaluation Complexity: 3 or more personal factors and/or comorbidities  Examination of Body Systems (PT Eval Complexity): total of 3 or more elements  Clinical Presentation (PT Evaluation Complexity): evolving  Clinical Decision Making (PT Evaluation Complexity): moderate complexity  Overall Complexity (PT Evaluation Complexity): moderate complexity     PT Charges       Row Name 04/03/24 1011             Time Calculation    Start Time 0815  -BA      PT Received On 04/03/24  -BA      PT Goal Re-Cert Due Date 04/13/24  -BA         Untimed Charges    PT Eval/Re-eval Minutes 58  -BA         Total Minutes    Untimed Charges Total Minutes 58  -BA       Total Minutes 58  -BA                User Key  (r) = Recorded By, (t) = Taken By, (c) = Cosigned By      Initials Name Provider Type    BA Magda Manley, PT Physical Therapist                  Therapy Charges for Today        Code Description Service Date Service Provider Modifiers Qty    13462499958 HC PT EVAL MOD COMPLEXITY 4 4/3/2024 Magda Manley, PT GP 1            PT G-Codes  Outcome Measure Options: AM-PAC 6 Clicks Basic Mobility (PT)  AM-PAC 6 Clicks Score (PT): 21  PT Discharge Summary  Anticipated Discharge Disposition (PT): home with assist, home with outpatient therapy services    Magda Manley, PT  4/3/2024

## 2024-04-04 ENCOUNTER — APPOINTMENT (OUTPATIENT)
Dept: NEPHROLOGY | Facility: HOSPITAL | Age: 59
End: 2024-04-04
Payer: COMMERCIAL

## 2024-04-04 LAB
GLUCOSE BLDC GLUCOMTR-MCNC: 118 MG/DL (ref 70–130)
GLUCOSE BLDC GLUCOMTR-MCNC: 153 MG/DL (ref 70–130)
GLUCOSE BLDC GLUCOMTR-MCNC: 158 MG/DL (ref 70–130)
GLUCOSE BLDC GLUCOMTR-MCNC: 161 MG/DL (ref 70–130)
GLUCOSE BLDC GLUCOMTR-MCNC: 190 MG/DL (ref 70–130)

## 2024-04-04 PROCEDURE — 99232 SBSQ HOSP IP/OBS MODERATE 35: CPT | Performed by: INTERNAL MEDICINE

## 2024-04-04 PROCEDURE — 63710000001 INSULIN LISPRO (HUMAN) PER 5 UNITS: Performed by: FAMILY MEDICINE

## 2024-04-04 PROCEDURE — 82948 REAGENT STRIP/BLOOD GLUCOSE: CPT

## 2024-04-04 PROCEDURE — 25010000002 HEPARIN (PORCINE) PER 1000 UNITS: Performed by: FAMILY MEDICINE

## 2024-04-04 PROCEDURE — 25010000002 HEPARIN (PORCINE) PER 1000 UNITS: Performed by: INTERNAL MEDICINE

## 2024-04-04 RX ORDER — BENZONATATE 100 MG/1
200 CAPSULE ORAL 3 TIMES DAILY PRN
Status: DISCONTINUED | OUTPATIENT
Start: 2024-04-04 | End: 2024-04-05 | Stop reason: HOSPADM

## 2024-04-04 RX ADMIN — ATORVASTATIN CALCIUM 10 MG: 10 TABLET, FILM COATED ORAL at 20:16

## 2024-04-04 RX ADMIN — BENZONATATE 200 MG: 100 CAPSULE ORAL at 20:41

## 2024-04-04 RX ADMIN — Medication 10 ML: at 20:17

## 2024-04-04 RX ADMIN — BENZONATATE 200 MG: 100 CAPSULE ORAL at 02:49

## 2024-04-04 RX ADMIN — AMLODIPINE BESYLATE 10 MG: 10 TABLET ORAL at 10:20

## 2024-04-04 RX ADMIN — HEPARIN SODIUM 1000 UNITS: 1000 INJECTION INTRAVENOUS; SUBCUTANEOUS at 09:03

## 2024-04-04 RX ADMIN — BENZONATATE 200 MG: 100 CAPSULE ORAL at 09:02

## 2024-04-04 RX ADMIN — HEPARIN SODIUM 5000 UNITS: 5000 INJECTION INTRAVENOUS; SUBCUTANEOUS at 20:12

## 2024-04-04 RX ADMIN — HYDRALAZINE HYDROCHLORIDE 25 MG: 25 TABLET ORAL at 20:12

## 2024-04-04 RX ADMIN — HEPARIN SODIUM 5000 UNITS: 5000 INJECTION INTRAVENOUS; SUBCUTANEOUS at 10:20

## 2024-04-04 RX ADMIN — CARVEDILOL 3.12 MG: 3.12 TABLET, FILM COATED ORAL at 17:03

## 2024-04-04 RX ADMIN — Medication 10 ML: at 10:21

## 2024-04-04 RX ADMIN — CARVEDILOL 3.12 MG: 3.12 TABLET, FILM COATED ORAL at 10:19

## 2024-04-04 RX ADMIN — HYDRALAZINE HYDROCHLORIDE 25 MG: 25 TABLET ORAL at 10:20

## 2024-04-04 RX ADMIN — INSULIN LISPRO 2 UNITS: 100 INJECTION, SOLUTION INTRAVENOUS; SUBCUTANEOUS at 11:28

## 2024-04-04 RX ADMIN — HYDRALAZINE HYDROCHLORIDE 25 MG: 25 TABLET ORAL at 16:10

## 2024-04-04 RX ADMIN — BUMETANIDE 2 MG: 1 TABLET ORAL at 10:20

## 2024-04-04 RX ADMIN — BUMETANIDE 2 MG: 1 TABLET ORAL at 20:16

## 2024-04-04 RX ADMIN — INSULIN LISPRO 2 UNITS: 100 INJECTION, SOLUTION INTRAVENOUS; SUBCUTANEOUS at 20:12

## 2024-04-04 NOTE — PROGRESS NOTES
LOS: 2 days   Patient Care Team:  Larissa Alexandre APRN as PCP - General (Nurse Practitioner)  Toñito Almazan MD as Cardiologist (Cardiology)    Chief Complaint: CKD stage V now ESRD    Subjective         Subjective:  Symptoms:  Stable.  No shortness of breath or chest pain.    Diet:  Adequate intake.        History taken from: patient    Objective     Vital Sign Min/Max for last 24 hours  Temp  Min: 97.5 °F (36.4 °C)  Max: 98.8 °F (37.1 °C)   BP  Min: 105/69  Max: 187/86   Pulse  Min: 63  Max: 78   Resp  Min: 15  Max: 22   SpO2  Min: 90 %  Max: 99 %   Flow (L/min)  Min: 2  Max: 2   No data recorded     Flowsheet Rows      Flowsheet Row First Filed Value   Admission Height --   Admission Weight 141 kg (310 lb 13.6 oz) Documented at 04/02/2024 1854            No intake/output data recorded.  I/O last 3 completed shifts:  In: 590 [P.O.:590]  Out: 5175 [Urine:3675]    Objective:  General Appearance:  Comfortable.    Vital signs: (most recent): Blood pressure (!) 187/86, pulse 73, temperature 97.6 °F (36.4 °C), temperature source Oral, resp. rate 16, weight (!) 141 kg (310 lb 13.6 oz), SpO2 94%.  Vital signs are normal.    Output: Producing urine.    HEENT: Normal HEENT exam.    Lungs:  Normal effort and normal respiratory rate.  Breath sounds clear to auscultation.  He is not in respiratory distress.  No decreased breath sounds or wheezes.    Heart: Normal rate.  Regular rhythm.  S1 normal and S2 normal.  No murmur or gallop.   Abdomen: Abdomen is soft.    Extremities: There is local swelling.  There is no dependent edema.    Pulses: Distal pulses are intact.    Neurological: Patient is alert and oriented to person, place and time.  Normal strength.    Pupils:  Pupils are equal, round, and reactive to light.                Results Review:     I reviewed the patient's new clinical results.    WBC WBC   Date Value Ref Range Status   04/03/2024 8.12 3.40 - 10.80 10*3/mm3 Final   04/02/2024 10.11 3.40 - 10.80 10*3/mm3  "Final      HGB Hemoglobin   Date Value Ref Range Status   04/03/2024 8.0 (L) 13.0 - 17.7 g/dL Final   04/02/2024 8.8 (L) 13.0 - 17.7 g/dL Final      HCT Hematocrit   Date Value Ref Range Status   04/03/2024 25.6 (L) 37.5 - 51.0 % Final   04/02/2024 27.7 (L) 37.5 - 51.0 % Final      Platlets No results found for: \"LABPLAT\"   MCV MCV   Date Value Ref Range Status   04/03/2024 91.1 79.0 - 97.0 fL Final   04/02/2024 90.5 79.0 - 97.0 fL Final          Sodium Sodium   Date Value Ref Range Status   04/03/2024 142 136 - 145 mmol/L Final   04/02/2024 145 136 - 145 mmol/L Final      Potassium Potassium   Date Value Ref Range Status   04/03/2024 5.0 3.5 - 5.2 mmol/L Final   04/02/2024 4.5 3.5 - 5.2 mmol/L Final      Chloride Chloride   Date Value Ref Range Status   04/03/2024 107 98 - 107 mmol/L Final   04/02/2024 108 (H) 98 - 107 mmol/L Final      CO2 CO2   Date Value Ref Range Status   04/03/2024 19.0 (L) 22.0 - 29.0 mmol/L Final   04/02/2024 20.0 (L) 22.0 - 29.0 mmol/L Final      BUN BUN   Date Value Ref Range Status   04/03/2024 67 (H) 6 - 20 mg/dL Final   04/02/2024 66 (H) 6 - 20 mg/dL Final      Creatinine Creatinine   Date Value Ref Range Status   04/03/2024 8.71 (H) 0.76 - 1.27 mg/dL Final   04/02/2024 8.85 (H) 0.76 - 1.27 mg/dL Final      Calcium Calcium   Date Value Ref Range Status   04/03/2024 8.2 (L) 8.6 - 10.5 mg/dL Final   04/02/2024 8.6 8.6 - 10.5 mg/dL Final      PO4 No results found for: \"CAPO4\"   Albumin Albumin   Date Value Ref Range Status   04/03/2024 3.4 (L) 3.5 - 5.2 g/dL Final   04/02/2024 3.6 3.5 - 5.2 g/dL Final      Magnesium Magnesium   Date Value Ref Range Status   04/03/2024 2.5 1.6 - 2.6 mg/dL Final   04/02/2024 2.5 1.6 - 2.6 mg/dL Final      Uric Acid No results found for: \"URICACID\"     Medication Review: Yes    Assessment & Plan       ESRD needing dialysis      Assessment & Plan    CKD stage V: Etiology diabetic nephropathy. Started on iHD for optimization of volume status on this admission " 4/3/24 through [facility].      Volume status: 2+ edema b/l LE. Optimization with HD. Continue with diuretics     Met acidosis: Due to advance CKD      Anemia: ACD due to CKD. Check iron panel      DM: Hx of poorly controlled DM and diabetic nephropathy with proteinuria.      Recs  3rd HD treatment tomorrow. Ok for discharge afterwards.   SW consult to establish care with [facility] (Home program). Patient will be doing intermittent HD in transitional unit alongside home hemo dialysis training.   DARNELL on HD days.  Continue with diuretics.   Continue with antiHTN meds.   Appointment with [facility] on April 9th for AV access placement.     I discussed the patients findings and my recommendations with patient       Suraj Victoria MD  04/04/24  09:20 EDT

## 2024-04-04 NOTE — PLAN OF CARE
Pt A&Ox4 and VSS. Pt resting well up in chair on home CPAP w/O2.

## 2024-04-04 NOTE — PROGRESS NOTES
[facility] Medicine Services  PROGRESS NOTE    Patient Name: Amrita Corbin See II  : 1965  MRN: [id number]    Date of Admission: 2024  Primary Care Physician: Larissa Alexandre APRN    Subjective   Subjective     CC:  ESRD    HPI:  Seen after HD, feels ok, no complaints other than some tenderness around skin at catheter insertion      Objective   Objective     Vital Signs:   Temp:  [97.6 °F (36.4 °C)-98.8 °F (37.1 °C)] 97.6 °F (36.4 °C)  Heart Rate:  [63-78] (P) 77  Resp:  [15-22] 16  BP: (105-187)/(59-94) (P) 152/77  Flow (L/min):  [2] 2     Physical Exam:  Constitutional: No acute distress, awake, alert  HENT: NCAT, mucous membranes moist  Respiratory:Respiratory effort normal, tunneled HD catheter in R chest  Cardiovascular: RRR, no murmurs  Gastrointestinal: soft, obese  Musculoskeletal: LE edema  Psychiatric: Appropriate affect, cooperative  Neurologic: Oriented x 3, speech clear  Skin: No rashes      Results Reviewed:  LAB RESULTS:      Lab 24  0751 24  0624   WBC  --  8.12 10.11   HEMOGLOBIN  --  8.0* 8.8*   HEMATOCRIT  --  25.6* 27.7*   PLATELETS  --  305 329   NEUTROS ABS  --  6.06 7.26*   IMMATURE GRANS (ABS)  --  0.03 0.03   LYMPHS ABS  --  1.09 1.57   MONOS ABS  --  0.69 0.96*   EOS ABS  --  0.18 0.22   MCV  --  91.1 90.5   PROCALCITONIN  --   --  0.18   PROTIME 14.6*  --   --          Lab 24  0624   SODIUM 142 145   POTASSIUM 5.0 4.5   CHLORIDE 107 108*   CO2 19.0* 20.0*   ANION GAP 16.0* 17.0*   BUN 67* 66*   CREATININE 8.71* 8.85*   EGFR 6.5* 6.4*   GLUCOSE 136* 78   CALCIUM 8.2* 8.6   MAGNESIUM 2.5 2.5   HEMOGLOBIN A1C  --  5.90*         Lab 24  0600 24  1918   TOTAL PROTEIN 5.4* 6.5   ALBUMIN 3.4* 3.6   GLOBULIN 2.0 2.9   ALT (SGPT) 9 9   AST (SGOT) 14 16   BILIRUBIN 0.2 0.2   ALK PHOS 89 88         Lab 24  0751   PROTIME 14.6*   INR 1.12                 Brief Urine Lab Results  (Last result in the past  365 days)        Color   Clarity   Blood   Leuk Est   Nitrite   Protein   CREAT   Urine HCG        08/03/23 1002             56.4                 Microbiology Results Abnormal       None            XR Chest 1 View    Result Date: 4/2/2024  XR CHEST 1 VW Date of Exam: 4/2/2024 10:56 PM EDT Indication: Dyspnea, FVO Comparison: 8/10/2023 Findings: There is dense and extensive airspace disease bilaterally, involving much of the left mid and lower lung, and right lower lung. There is probably mild upper lung disease as well. Airspace opacity partially silhouettes the left hemidiaphragm and right heart shadow. There is minimal if any effusion. The heart shadow itself is partially obscured by the extensive airspace disease, but now appears mildly enlarged, increased from prior study. The pulmonary vasculature, where not obscured by pulmonary disease, appears cephalized. No pneumothorax is seen. Bony structures appear intact.     Impression: Impression: Interval development of extensive bilateral airspace disease. Interval heart shadow enlargement and new pulmonary vascular congestion. Findings could all reflect volume overload with pulmonary alveolar edema, but a superimposed bilateral pneumonia should  be considered as well. Electronically Signed: Marcos Greenfield MD  4/2/2024 11:15 PM EDT  Workstation ID: [id number]         Current medications:  Scheduled Meds:amLODIPine, 10 mg, Oral, Daily  atorvastatin, 10 mg, Oral, Nightly  bumetanide, 2 mg, Oral, BID  carvedilol, 3.125 mg, Oral, BID With Meals  heparin (porcine), 5,000 Units, Subcutaneous, Q12H  hydrALAZINE, 25 mg, Oral, TID  insulin lispro, 2-9 Units, Subcutaneous, 4x Daily AC & at Bedtime  sodium chloride, 10 mL, Intravenous, Q12H      Continuous Infusions:   PRN Meds:.  acetaminophen    albumin human    benzonatate    senna-docusate sodium **AND** polyethylene glycol **AND** bisacodyl **AND** bisacodyl    dextrose    dextrose    fentaNYL citrate (PF)    glucagon (human  recombinant)    heparin (porcine)    hydrALAZINE    midazolam    nitroglycerin    ondansetron    sodium chloride    sodium chloride    Assessment & Plan   Assessment & Plan     Active Hospital Problems    Diagnosis  POA    **ESRD needing dialysis [N18.6, Z99.2]  Yes      Resolved Hospital Problems   No resolved problems to display.        Brief Hospital Course to date:  Amrita Buckley II is a 58 y.o. male with history of hypertension, DM2 CKD sent by nephrology for uremia and need to initiate HD.     CKD with progression to ESRD  -Nephrology consulted, first HD 4/3  -IR consulted for dialysis access, placed 4/3  -Has outpatient appointment next Tuesday, 4/9 with Dr. Allen for fistula eval  -Case management consulted for outpatient dialysis planning        DM2  -Last A1c 5.9  -Continue SSI     Hypertension  -improved, titrate as needed     Dyslipidemia  -continue statin     MOY  Obesity      Expected Discharge Location and Transportation: home  Expected Discharge   Expected Discharge Date: 4/5/2024; Expected Discharge Time:      DVT prophylaxis:  Medical and mechanical DVT prophylaxis orders are present.         AM-PAC 6 Clicks Score (PT): (P) 21 (04/04/24 1019)    CODE STATUS:   Code Status and Medical Interventions:   Ordered at: 04/02/24 8345     Level Of Support Discussed With:    Patient     Code Status (Patient has no pulse and is not breathing):    CPR (Attempt to Resuscitate)     Medical Interventions (Patient has pulse or is breathing):    Full Support       Yasmine Mckinnon, DO  04/04/24

## 2024-04-04 NOTE — CASE MANAGEMENT/SOCIAL WORK
Continued Stay Note  [facility]     Patient Name: Amrita Buckley II  MRN: [id number]  Today's Date: 4/4/2024    Admit Date: 4/2/2024    Plan: Home   Discharge Plan       Row Name 04/04/24 1829       Plan    Plan Home    Patient/Family in Agreement with Plan yes    Plan Comments I have met with Mr. Buckley earlier today to discuss the discharge plan.  He is agreeable to submission of [facility] referral to [facility].  I have spoken to Maura at [facility] who states that she will assign a coordinator and they will call us tomorrow.  They request info be faxed to [phone].  I have also faxed requested info to  Home program.  CM will f/u with  [facility] in the am and fax requested info per their checklist.  Per Provider Mr. Buckley will likely be ready for discharge soon and anticipates HD early next week in clinic. CM will cont to follow plan of care and assist with discharge planning as appropriate.                   Discharge Codes    No documentation.                 Expected Discharge Date and Time       Expected Discharge Date Expected Discharge Time    Apr 5, 2024               Sasha Pastor RN

## 2024-04-04 NOTE — DISCHARGE PLACEMENT REQUEST
"Case Management  [phone]    Nicole Buckley II (58 y.o. Male)       Date of Birth   1965    Social Security Number       Address   [address] [place] [address]    Home Phone   [phone]    MRN   [id number]       [facility]    Marital Status                               Admission Date   4/2/24    Admission Type   Urgent    Admitting Provider   Yasmine Mckinnon DO    Attending Provider   Yasmine Mckinnon DO    Department, Room/Bed   [address], S445/1       Discharge Date       Discharge Disposition       Discharge Destination                                 Attending Provider: Yasmine Mckinnon DO    Allergies: No Known Allergies    Isolation: None   Infection: None   Code Status: CPR    Ht: 180.3 cm (71\")   Wt: 141 kg (310 lb 13.6 oz)    Admission Cmt: None   Principal Problem: ESRD needing dialysis [N18.6,Z99.2]                   Active Insurance as of 4/2/2024       Primary Coverage       Payor Plan Insurance Group Employer/Plan Group    MISC COMMERCIAL MISC COMMERCIAL NGN       Coverage Address Coverage Phone Number Coverage Fax Number Effective Dates    [address] [phone]  11/1/2023 - None Entered    [place] [address]         Subscriber Name Subscriber Birth Date Member ID       NICOLE BUCKLEY II 1965 [id number]                     Emergency Contacts        (Rel.) Home Phone Work Phone Mobile Phone    lyssa buckley (Spouse) [phone] -- --                 History & Physical        Claudia Gil APRN at 04/03/24 1328       Attestation signed by Jorgito Johnston III, MD at 04/04/24 0804    I have reviewed note and agree                  H&P reviewed. The patient was examined and there are no changes to the H&P.      Tunnel dialysis catheter placement.        Electronically signed by Jorgito Johnston III, MD at 04/04/24 0804   Source Note: H&P (View-Only)              [facility] " [facility] Medicine Services  PROGRESS NOTE    Patient Name: Amrita Corbin See II  : 1965  MRN: [id number]    Date of Admission: 2024  Primary Care Physician: Larissa Alexandre APRN    Subjective  Subjective     CC:  Progression of CKD    HPI:  Feeling okay, short of breath.  Has outpatient follow-up with Dr. Allen next Tuesday that was previously scheduled prior to admission      Objective  Objective     Vital Signs:   Temp:  [97.5 °F (36.4 °C)-97.9 °F (36.6 °C)] 97.5 °F (36.4 °C)  Heart Rate:  [61-77] (P) 66  Resp:  [18] 18  BP: (141-195)/(67-90) (P) 159/78  Flow (L/min):  [2-7] (P) 2     Physical Exam:  Constitutional: No acute distress, awake, alert  HENT: NCAT, mucous membranes moist  Respiratory: Respiratory effort normal   Cardiovascular: RRR on tele  Gastrointestinal: obese  Musculoskeletal: LE edema  Psychiatric: Appropriate affect, cooperative  Neurologic: Oriented x 3, speech clear  Skin: No rashes      Results Reviewed:  LAB RESULTS:      Lab 24  0751 24  0624   WBC  --  8.12 10.11   HEMOGLOBIN  --  8.0* 8.8*   HEMATOCRIT  --  25.6* 27.7*   PLATELETS  --  305 329   NEUTROS ABS  --  6.06 7.26*   IMMATURE GRANS (ABS)  --  0.03 0.03   LYMPHS ABS  --  1.09 1.57   MONOS ABS  --  0.69 0.96*   EOS ABS  --  0.18 0.22   MCV  --  91.1 90.5   PROCALCITONIN  --   --  0.18   PROTIME 14.6*  --   --          Lab 24  0624   SODIUM 142 145   POTASSIUM 5.0 4.5   CHLORIDE 107 108*   CO2 19.0* 20.0*   ANION GAP 16.0* 17.0*   BUN 67* 66*   CREATININE 8.71* 8.85*   EGFR 6.5* 6.4*   GLUCOSE 136* 78   CALCIUM 8.2* 8.6   MAGNESIUM 2.5 2.5   HEMOGLOBIN A1C  --  5.90*         Lab 24  0624  1918   TOTAL PROTEIN 5.4* 6.5   ALBUMIN 3.4* 3.6   GLOBULIN 2.0 2.9   ALT (SGPT) 9 9   AST (SGOT) 14 16   BILIRUBIN 0.2 0.2   ALK PHOS 89 88         Lab 24  0751   PROTIME 14.6*   INR 1.12                 Brief Urine Lab Results  (Last result in the past 365  days)        Color   Clarity   Blood   Leuk Est   Nitrite   Protein   CREAT   Urine HCG        08/03/23 1002             56.4                 Microbiology Results Abnormal       None            XR Chest 1 View    Result Date: 4/2/2024  XR CHEST 1 VW Date of Exam: 4/2/2024 10:56 PM EDT Indication: Dyspnea, FVO Comparison: 8/10/2023 Findings: There is dense and extensive airspace disease bilaterally, involving much of the left mid and lower lung, and right lower lung. There is probably mild upper lung disease as well. Airspace opacity partially silhouettes the left hemidiaphragm and right heart shadow. There is minimal if any effusion. The heart shadow itself is partially obscured by the extensive airspace disease, but now appears mildly enlarged, increased from prior study. The pulmonary vasculature, where not obscured by pulmonary disease, appears cephalized. No pneumothorax is seen. Bony structures appear intact.     Impression: Impression: Interval development of extensive bilateral airspace disease. Interval heart shadow enlargement and new pulmonary vascular congestion. Findings could all reflect volume overload with pulmonary alveolar edema, but a superimposed bilateral pneumonia should  be considered as well. Electronically Signed: Marcos Greenfield MD  4/2/2024 11:15 PM EDT  Workstation ID: [id number]         Current medications:  Scheduled Meds:amLODIPine, 10 mg, Oral, Daily  atorvastatin, 10 mg, Oral, Nightly  bumetanide, 2 mg, Oral, BID  carvedilol, 3.125 mg, Oral, BID With Meals  heparin (porcine), 5,000 Units, Subcutaneous, Q12H  hydrALAZINE, 25 mg, Oral, TID  insulin lispro, 2-9 Units, Subcutaneous, 4x Daily AC & at Bedtime  sodium chloride, 10 mL, Intravenous, Q12H      Continuous Infusions:   PRN Meds:.  acetaminophen    senna-docusate sodium **AND** polyethylene glycol **AND** bisacodyl **AND** bisacodyl    dextrose    dextrose    glucagon (human recombinant)    hydrALAZINE    nitroglycerin    ondansetron     sodium chloride    sodium chloride    Assessment & Plan  Assessment & Plan     Active Hospital Problems    Diagnosis  POA    **ESRD needing dialysis [N18.6, Z99.2]  Yes      Resolved Hospital Problems   No resolved problems to display.        Brief Hospital Course to date:  Amrita Corbin See II is a 58 y.o. male with history of hypertension,, DM2 CKD sent by nephrology for uremia and need to initiate HD.    CKD with progression to ESRD  -Nephrology consulted, first HD planned after catheter placement  -IR consulted for dialysis access  -Has outpatient appointment next Tuesday,  with Dr. Allen for fistula eval  -Case management consulted for outpatient dialysis planning      DM2  -Last A1c 5.9  -Continue SSI    Hypertension  -dc cardizem as he is also taking amlodipine and add carvedilol as replacement with hold parameters for HR    Dyslipidemia  -continue statin    MOY      Expected Discharge Location and Transportation: home  Expected Discharge   Expected Discharge Date: 2024; Expected Discharge Time:      DVT prophylaxis:  Medical and mechanical DVT prophylaxis orders are present.         AM-PAC 6 Clicks Score (PT): 21 (24 1009)    CODE STATUS:   Code Status and Medical Interventions:   Ordered at: 24 1855     Level Of Support Discussed With:    Patient     Code Status (Patient has no pulse and is not breathing):    CPR (Attempt to Resuscitate)     Medical Interventions (Patient has pulse or is breathing):    Full Support       Yasmine Mckinnon DO  24        Electronically signed by Yasmine Mckinnon DO at 24 1208                 Yasmine Mckinnon DO at 24 0732              [facility] Medicine Services  PROGRESS NOTE    Patient Name: Amrita Buckley II  : 1965  MRN: [id number]    Date of Admission: 2024  Primary Care Physician: Larissa Alexandre APRN    Subjective  Subjective     CC:  Progression of CKD    HPI:  Feeling okay,  short of breath.  Has outpatient follow-up with Dr. Allen next Tuesday that was previously scheduled prior to admission      Objective  Objective     Vital Signs:   Temp:  [97.5 °F (36.4 °C)-97.9 °F (36.6 °C)] 97.5 °F (36.4 °C)  Heart Rate:  [61-77] (P) 66  Resp:  [18] 18  BP: (141-195)/(67-90) (P) 159/78  Flow (L/min):  [2-7] (P) 2     Physical Exam:  Constitutional: No acute distress, awake, alert  HENT: NCAT, mucous membranes moist  Respiratory: Respiratory effort normal   Cardiovascular: RRR on tele  Gastrointestinal: obese  Musculoskeletal: LE edema  Psychiatric: Appropriate affect, cooperative  Neurologic: Oriented x 3, speech clear  Skin: No rashes      Results Reviewed:  LAB RESULTS:      Lab 04/03/24 0751 04/03/24 0600 04/02/24 1918   WBC  --  8.12 10.11   HEMOGLOBIN  --  8.0* 8.8*   HEMATOCRIT  --  25.6* 27.7*   PLATELETS  --  305 329   NEUTROS ABS  --  6.06 7.26*   IMMATURE GRANS (ABS)  --  0.03 0.03   LYMPHS ABS  --  1.09 1.57   MONOS ABS  --  0.69 0.96*   EOS ABS  --  0.18 0.22   MCV  --  91.1 90.5   PROCALCITONIN  --   --  0.18   PROTIME 14.6*  --   --          Lab 04/03/24 0600 04/02/24 1918   SODIUM 142 145   POTASSIUM 5.0 4.5   CHLORIDE 107 108*   CO2 19.0* 20.0*   ANION GAP 16.0* 17.0*   BUN 67* 66*   CREATININE 8.71* 8.85*   EGFR 6.5* 6.4*   GLUCOSE 136* 78   CALCIUM 8.2* 8.6   MAGNESIUM 2.5 2.5   HEMOGLOBIN A1C  --  5.90*         Lab 04/03/24 0600 04/02/24 1918   TOTAL PROTEIN 5.4* 6.5   ALBUMIN 3.4* 3.6   GLOBULIN 2.0 2.9   ALT (SGPT) 9 9   AST (SGOT) 14 16   BILIRUBIN 0.2 0.2   ALK PHOS 89 88         Lab 04/03/24  0751   PROTIME 14.6*   INR 1.12                 Brief Urine Lab Results  (Last result in the past 365 days)        Color   Clarity   Blood   Leuk Est   Nitrite   Protein   CREAT   Urine HCG        08/03/23 1002             56.4                 Microbiology Results Abnormal       None            XR Chest 1 View    Result Date: 4/2/2024  XR CHEST 1 VW Date of Exam: 4/2/2024  10:56 PM EDT Indication: Dyspnea, FVO Comparison: 8/10/2023 Findings: There is dense and extensive airspace disease bilaterally, involving much of the left mid and lower lung, and right lower lung. There is probably mild upper lung disease as well. Airspace opacity partially silhouettes the left hemidiaphragm and right heart shadow. There is minimal if any effusion. The heart shadow itself is partially obscured by the extensive airspace disease, but now appears mildly enlarged, increased from prior study. The pulmonary vasculature, where not obscured by pulmonary disease, appears cephalized. No pneumothorax is seen. Bony structures appear intact.     Impression: Impression: Interval development of extensive bilateral airspace disease. Interval heart shadow enlargement and new pulmonary vascular congestion. Findings could all reflect volume overload with pulmonary alveolar edema, but a superimposed bilateral pneumonia should  be considered as well. Electronically Signed: Marcos Greenfield MD  4/2/2024 11:15 PM EDT  Workstation ID: [id number]         Current medications:  Scheduled Meds:amLODIPine, 10 mg, Oral, Daily  atorvastatin, 10 mg, Oral, Nightly  bumetanide, 2 mg, Oral, BID  carvedilol, 3.125 mg, Oral, BID With Meals  heparin (porcine), 5,000 Units, Subcutaneous, Q12H  hydrALAZINE, 25 mg, Oral, TID  insulin lispro, 2-9 Units, Subcutaneous, 4x Daily AC & at Bedtime  sodium chloride, 10 mL, Intravenous, Q12H      Continuous Infusions:   PRN Meds:.  acetaminophen    senna-docusate sodium **AND** polyethylene glycol **AND** bisacodyl **AND** bisacodyl    dextrose    dextrose    glucagon (human recombinant)    hydrALAZINE    nitroglycerin    ondansetron    sodium chloride    sodium chloride    Assessment & Plan  Assessment & Plan     Active Hospital Problems    Diagnosis  POA    **ESRD needing dialysis [N18.6, Z99.2]  Yes      Resolved Hospital Problems   No resolved problems to display.        Brief Hospital Course to  date:  Amrita Buckley II is a 58 y.o. male with history of hypertension,, DM2 CKD sent by nephrology for uremia and need to initiate HD.    CKD with progression to ESRD  -Nephrology consulted, first HD planned after catheter placement  -IR consulted for dialysis access  -Has outpatient appointment next Tuesday,  with Dr. Allen for fistula eval  -Case management consulted for outpatient dialysis planning      DM2  -Last A1c 5.9  -Continue SSI    Hypertension  -dc cardizem as he is also taking amlodipine and add carvedilol as replacement with hold parameters for HR    Dyslipidemia  -continue statin    MOY      Expected Discharge Location and Transportation: home  Expected Discharge   Expected Discharge Date: 2024; Expected Discharge Time:      DVT prophylaxis:  Medical and mechanical DVT prophylaxis orders are present.         AM-PAC 6 Clicks Score (PT): 21 (24 1009)    CODE STATUS:   Code Status and Medical Interventions:   Ordered at: 24 1855     Level Of Support Discussed With:    Patient     Code Status (Patient has no pulse and is not breathing):    CPR (Attempt to Resuscitate)     Medical Interventions (Patient has pulse or is breathing):    Full Support       Yasmine Mckinnon DO  24        Electronically signed by Yasmine Mckinnon DO at 24 1208       DON Jon DO at 24 1857              [facility] Medicine Services  HISTORY AND PHYSICAL    Patient Name: Amrita Buckley II  : 1965  MRN: [id number]  Primary Care Physician: Larissa Alexandre APRN  Date of admission: 2024      Subjective  Subjective     Chief Complaint:  ESRD, Abnormal Labs     HPI:  Amrita Buckley II is a 58 y.o. male seen and examined by me this evening following direct admission from nephrology clinic with Dr. Soliman earlier today. Patient has history of CKD with worsening renal function and now CKD stage V. Patient admitted to initiate HD with plans for  permHD cath placement and to start HD in the AM. Also plans for consultation and evaluation by Dr. Davenport with  for possible AV fistula in the near future. Patient denies any current complaints at this time other than generalized swelling, patient's wife updated at bedside as well.       Personal History     Past Medical History:   Diagnosis Date    Bronchitis     Elevated cholesterol     Hyperlipidemia     Hypertension     Sleep apnea 10/2023    Type 2 diabetes mellitus            Past Surgical History:   Procedure Laterality Date    ROTATOR CUFF REPAIR Left        Family History: family history includes Diabetes in his father; Heart attack in his father; Heart disease in his mother; Hypertension in his father and mother; Kidney disease in his father; Lung disease in his mother; Stroke in his maternal grandmother and paternal grandmother.     Social History:  reports that he has never smoked. He has been exposed to tobacco smoke. He has never used smokeless tobacco. He reports current alcohol use. He reports that he does not use drugs.  Social History     Social History Narrative        Has worked as an     Lifelong non-smoker       Medications:  Available home medication information reviewed.  Azelastine HCl, FreeStyle Margo 2 Sensor, Insulin Glargine, Insulin Lispro (1 Unit Dial), Insulin Pen Needle, Semaglutide(0.25 or 0.5MG/DOS), albuterol sulfate HFA, amLODIPine, atorvastatin, bumetanide, calcitriol, dilTIAZem CD, glucose blood, guaiFENesin-codeine, hydrALAZINE, ipratropium, isosorbide mononitrate, levocetirizine, sodium bicarbonate, sodium zirconium cyclosilicate, and vitamin D    No Known Allergies    Objective  Objective     Vital Signs:   Temp:  [97.9 °F (36.6 °C)] 97.9 °F (36.6 °C)  Heart Rate:  [74-77] 74  Resp:  [18] 18  BP: (189-193)/(83-90) 189/89  Flow (L/min):  [2.5-4] 4       Physical Exam   Constitutional: No acute distress, awake, alert, on RA   HENT: NCAT, nares patent,  mucous membranes moist  Respiratory: Decreased BS bilaterally, no rhonchi or wheezing, respiratory effort normal   Cardiovascular: RRR, no murmurs, rubs, or gallops  Gastrointestinal: Positive bowel sounds, soft, nontender, nondistended  Musculoskeletal: 2-3+ bilateral ankle edema, no clubbing or cyanosis   Psychiatric: Appropriate affect, cooperative  Neurologic: Oriented x 3, strength symmetric in all extremities, Cranial Nerves grossly intact to confrontation, speech clear  Skin: No rashes      Result Review:  I have personally reviewed the results from the time of this admission to 4/2/2024 22:02 EDT and agree with these findings:  [x]  Laboratory list / accordion  []  Microbiology  []  Radiology  []  EKG/Telemetry   []  Cardiology/Vascular   []  Pathology  [x]  Old records  []  Other:  Most notable findings include:   Cr 8.85   Hgb 8.8   A1C 5.9       LAB RESULTS:      Lab 04/02/24 1918   WBC 10.11   HEMOGLOBIN 8.8*   HEMATOCRIT 27.7*   PLATELETS 329   NEUTROS ABS 7.26*   IMMATURE GRANS (ABS) 0.03   LYMPHS ABS 1.57   MONOS ABS 0.96*   EOS ABS 0.22   MCV 90.5         Lab 04/02/24 1918   SODIUM 145   POTASSIUM 4.5   CHLORIDE 108*   CO2 20.0*   ANION GAP 17.0*   BUN 66*   CREATININE 8.85*   EGFR 6.4*   GLUCOSE 78   CALCIUM 8.6   MAGNESIUM 2.5   HEMOGLOBIN A1C 5.90*         Lab 04/02/24 1918   TOTAL PROTEIN 6.5   ALBUMIN 3.6   GLOBULIN 2.9   ALT (SGPT) 9   AST (SGOT) 16   BILIRUBIN 0.2   ALK PHOS 88                     UA          5/28/2023    16:56 5/29/2023    16:47 6/27/2023    09:19   Urinalysis   Squamous Epithelial Cells, UA 0-5     0-5     0-5       Specific Gravity, UA 1.023     1.025     1.017       Blood, UA Small     Small     Trace       Leukocytes, UA Negative     Negative     Negative       RBC, UA 4-10     1     4-10       Bacteria, UA Negative     Negative     Negative          Details          This result is from an external source.               Microbiology Results (last 10 days)       ** No  results found for the last 240 hours. **            No radiology results from the last 24 hrs        Assessment & Plan  Assessment & Plan       ESRD needing dialysis        Amrita Buckley is a 59 yo M that was a direct admission from Dr. Soliman office today with worsening renal failure and to initiate HD. Patient has a history of CKD secondary to T2DM and has had continued decline of his kidneys. He also has a PMH of HTN, HLD, MOY with use of CPAP at night. Patient resting comfortably in bed and wife updated at bedside. Pending further evaluation by Nephrology, GS, and IR in the AM, NPO after MN     ESRD needing HD   - Admitted from Dr. Soliman office, plans to initiate HD  - Consult to Nephrology, GS, and IR for HD cath placement and possible AV fistula formation   - Labs reviewed, plans for likely HD in the AM   - Likely will need outpatient HD arranged     T2DM   - FSBS c SSI coverage   - A1C 5.9     HTN  HLD   - Continue regular home amlodipine, bumex, cardizem, and Hydralazine   - Added PRN hydralazine and Clonidine x 1 ordered this PM while trying to get IV access     MOY  - Continued regular use of home CPAP       DVT prophylaxis:  Medical and mechanical DVT prophylaxis orders are present.          CODE STATUS:    Code Status and Medical Interventions:   Ordered at: 04/02/24 1855     Level Of Support Discussed With:    Patient     Code Status (Patient has no pulse and is not breathing):    CPR (Attempt to Resuscitate)     Medical Interventions (Patient has pulse or is breathing):    Full Support       Expected Discharge   Expected discharge date/ time has not been documented.     AHLEY Jon DO  04/02/24      Electronically signed by DON Jon DO at 04/02/24 2220          Physician Progress Notes (most recent note)        Suraj Victoria MD at 04/04/24 0920           LOS: 2 days   Patient Care Team:  Larissa Alexandre APRN as PCP - General (Nurse Practitioner)  Toñito Almazan MD as Cardiologist  (Cardiology)    Chief Complaint: CKD stage V now ESRD    Subjective         Subjective:  Symptoms:  Stable.  No shortness of breath or chest pain.    Diet:  Adequate intake.        History taken from: patient    Objective     Vital Sign Min/Max for last 24 hours  Temp  Min: 97.5 °F (36.4 °C)  Max: 98.8 °F (37.1 °C)   BP  Min: 105/69  Max: 187/86   Pulse  Min: 63  Max: 78   Resp  Min: 15  Max: 22   SpO2  Min: 90 %  Max: 99 %   Flow (L/min)  Min: 2  Max: 2   No data recorded     Flowsheet Rows      Flowsheet Row First Filed Value   Admission Height --   Admission Weight 141 kg (310 lb 13.6 oz) Documented at 04/02/2024 1854            No intake/output data recorded.  I/O last 3 completed shifts:  In: 590 [P.O.:590]  Out: 5175 [Urine:3675]    Objective:  General Appearance:  Comfortable.    Vital signs: (most recent): Blood pressure (!) 187/86, pulse 73, temperature 97.6 °F (36.4 °C), temperature source Oral, resp. rate 16, weight (!) 141 kg (310 lb 13.6 oz), SpO2 94%.  Vital signs are normal.    Output: Producing urine.    HEENT: Normal HEENT exam.    Lungs:  Normal effort and normal respiratory rate.  Breath sounds clear to auscultation.  He is not in respiratory distress.  No decreased breath sounds or wheezes.    Heart: Normal rate.  Regular rhythm.  S1 normal and S2 normal.  No murmur or gallop.   Abdomen: Abdomen is soft.    Extremities: There is local swelling.  There is no dependent edema.    Pulses: Distal pulses are intact.    Neurological: Patient is alert and oriented to person, place and time.  Normal strength.    Pupils:  Pupils are equal, round, and reactive to light.                Results Review:     I reviewed the patient's new clinical results.    WBC WBC   Date Value Ref Range Status   04/03/2024 8.12 3.40 - 10.80 10*3/mm3 Final   04/02/2024 10.11 3.40 - 10.80 10*3/mm3 Final      HGB Hemoglobin   Date Value Ref Range Status   04/03/2024 8.0 (L) 13.0 - 17.7 g/dL Final   04/02/2024 8.8 (L) 13.0 - 17.7  "g/dL Final      HCT Hematocrit   Date Value Ref Range Status   04/03/2024 25.6 (L) 37.5 - 51.0 % Final   04/02/2024 27.7 (L) 37.5 - 51.0 % Final      Platlets No results found for: \"LABPLAT\"   MCV MCV   Date Value Ref Range Status   04/03/2024 91.1 79.0 - 97.0 fL Final   04/02/2024 90.5 79.0 - 97.0 fL Final          Sodium Sodium   Date Value Ref Range Status   04/03/2024 142 136 - 145 mmol/L Final   04/02/2024 145 136 - 145 mmol/L Final      Potassium Potassium   Date Value Ref Range Status   04/03/2024 5.0 3.5 - 5.2 mmol/L Final   04/02/2024 4.5 3.5 - 5.2 mmol/L Final      Chloride Chloride   Date Value Ref Range Status   04/03/2024 107 98 - 107 mmol/L Final   04/02/2024 108 (H) 98 - 107 mmol/L Final      CO2 CO2   Date Value Ref Range Status   04/03/2024 19.0 (L) 22.0 - 29.0 mmol/L Final   04/02/2024 20.0 (L) 22.0 - 29.0 mmol/L Final      BUN BUN   Date Value Ref Range Status   04/03/2024 67 (H) 6 - 20 mg/dL Final   04/02/2024 66 (H) 6 - 20 mg/dL Final      Creatinine Creatinine   Date Value Ref Range Status   04/03/2024 8.71 (H) 0.76 - 1.27 mg/dL Final   04/02/2024 8.85 (H) 0.76 - 1.27 mg/dL Final      Calcium Calcium   Date Value Ref Range Status   04/03/2024 8.2 (L) 8.6 - 10.5 mg/dL Final   04/02/2024 8.6 8.6 - 10.5 mg/dL Final      PO4 No results found for: \"CAPO4\"   Albumin Albumin   Date Value Ref Range Status   04/03/2024 3.4 (L) 3.5 - 5.2 g/dL Final   04/02/2024 3.6 3.5 - 5.2 g/dL Final      Magnesium Magnesium   Date Value Ref Range Status   04/03/2024 2.5 1.6 - 2.6 mg/dL Final   04/02/2024 2.5 1.6 - 2.6 mg/dL Final      Uric Acid No results found for: \"URICACID\"     Medication Review: Yes    Assessment & Plan       ESRD needing dialysis      Assessment & Plan    CKD stage V: Etiology diabetic nephropathy. Started on iHD for optimization of volume status on this admission 4/3/24 through [facility].      Volume status: 2+ edema b/l LE. Optimization with HD. Continue with diuretics     Met acidosis: Due to " advance CKD      Anemia: ACD due to CKD. Check iron panel      DM: Hx of poorly controlled DM and diabetic nephropathy with proteinuria.      Recs  3rd HD treatment tomorrow. Ok for discharge afterwards.   SW consult to establish care with [facility] (Home program). Patient will be doing intermittent HD in transitional unit alongside home hemo dialysis training.   DARNELL on HD days.  Continue with diuretics.   Continue with antiHTN meds.   Appointment with Gen Surgery on April 9th for AV access placement.     I discussed the patients findings and my recommendations with patient       Suraj Victoria MD  04/04/24  09:20 EDT              Electronically signed by Suraj Victoria MD at 04/04/24 0924        Hepatitis Panel, Acute [[id number]] (Order [id number])  Order  Date: 4/3/2024 Department: [address] Released By: David Burgos RN (auto-released) Authorizing: Yasmine Mckinnon DO     Reprint Order Requisition    Hepatitis Panel, Acute (Order #[id number]) on 4/3/24        Hepatitis Panel, Acute  Order: [id number]  Status: Final result       Visible to patient: Yes (seen)       Next appt: 04/22/2024 at 02:30 PM in Oncology ( [facility])    Specimen Information: Blood   0 Result Notes       1  Topic       Component  Ref Range & Units 1 d ago 2 yr ago   Hepatitis B Surface Ag  Non-Reactive Non-Reactive    Hep A IgM  Non-Reactive Non-Reactive    Hep B C IgM  Non-Reactive Non-Reactive    Hepatitis C Ab  Non-Reactive Non-Reactive Non-Reactive   Resulting Agency  [facility]              Narrative  Performed by:  [facility]  Results may be falsely decreased if patient taking Biotin.      Specimen Collected: 04/03/24 16:12 EDT Last Resulted: 04/03/24 17:51 EDT        Lab Flowsheet        Order Details        View Encounter        Lab and Collection Details        Routing        Result History     View All Conversations on this Encounter           Result Care Coordination      Patient  Communication     Released  Seen Back to Top        Satisfied Health Maintenance Topics    Back to Top  HEPATITIS C SCREENING  Completed           Order Questions    Question Answer   Release to patient Routine Release       Provider Comment To Patient     Released  Seen     Routing History    Priority Sent On From To Message Type    4/3/2024  1:50 AM Lab, Background User DON Jon,  Results     Result Read / Acknowledged    Acknowledge result  No acknowledgement history exists for this order.     Lab Component SmartPhrase Guide    Hepatitis Panel, Acute (Order #[id number]) on 4/3/24            Other Results from 4/2/2024     POC Glucose Once Final result 4/4/2024    POC Glucose Once Final result 4/4/2024    POC Glucose Once Final result 4/4/2024    POC Glucose Once Final result 4/3/2024    POC Glucose Once Final result 4/3/2024    POC Glucose Once Final result 4/3/2024    Protime-INR Final result 4/3/2024    POC Glucose Once Final result 4/3/2024    CBC Auto Differential Final result 4/3/2024    Comprehensive Metabolic Panel Final result 4/3/2024    Magnesium Final result 4/3/2024    POC Glucose Once Final result 4/2/2024    Comprehensive Metabolic Panel Final result 4/2/2024    Magnesium Final result 4/2/2024    CBC Auto Differential Final result 4/2/2024    Hemoglobin A1c Final result 4/2/2024    Procalcitonin Final result 4/2/2024    important suggestion  Warning: Additional results from 4/2/2024 are available but are not displayed in this report.        Results  IR Insert Tunneled CV Catheter Without Port 5 Plus (Accession [id number]) (Order [id number])  Imaging Information    Exam Information    Performed Procedure Study Status Begin Time End Time   IR Insert Tunneled CV Catheter Without Port 5 Plus Exam Ended Wed Apr 3, 2024  2:15 PM Wed Apr 3, 2024  2:48 PM     Staff Information    Technologist  Assigned Physician(s) Assigned Pool(s)   Debi Zacarias N/A N/A [facility]  POOL     Verification Information    Signed By Signed On   N/A N/A     Questionnaire        Question Answer Comment   1. Reason for Exam: CKD stage V, Need for HD cath    2. Does this patient have a diabetic monitoring/medication delivering device on?     3. Release to patient Routine Release [[id number]]          Begin Exam       RIS IR BEGIN EXAM QNR    Question Answer Comment   1. How was the patient identified? Name date of birth and wristband    2. Patient education done? Yes    3. Was a timeout performed? Yes         End Exam       RIS IR END EXAM HARD STOP QNR    Question Answer Comment   1. Fluoro time in minutes and seconds (mm:ss) 0:42    2. Number of Fluro Images     3. Kerma-area product (Pka) or Dose area     4. Peak skin dose (PSD)     5. Reference air kerma (Ka,r) 7    6. Skin dose mapping     7. Was conscious sedation used for this exam?           [facility] PPE QNR    Question Answer Comment   1. Was PPE worn?     2. What PPE was used?     3. What staff wore PPE?     4. Was patient wearing a mask?          Charges     Quantity   HI MOD SED SAME PHYS/QHP INITIAL 15 MINS 5/> YRS 1      [address]  [address] [facility] [address]  Phone:  [phone]  Fax:  [phone]        Patient:     Amrita Corbin See II MRN:  [id number]   [address] [place] [address] :  1965  SSN:    Phone: [phone] Sex:  M      INSURANCE PA[employer]AN [employer] # SUBSCRIBER ID   Primary:    [employer] [id number] N [id number]   Admitting Diagnosis: Dialysis patient [Z99.2]  Order Date:  2024        Hemodialysis Inpatient       (Order ID: [id number])     Diagnosis:         Priority:  Routine Expected Date:   Expiration Date:        Interval:  Once Count:    Duration of Treatment: 2.5 Hours  Access Site: Tunneled Dialysis Catheter  Dialyzer: Revaclear  DFR: 500 mL/min  Dialysate Temperature (C): 36  BFR-As tolerated to a maximum of: 300 mL/min  Dialysate  Solution Bath: K+ = 3 mEq, CA = 2.5mg  Bicarb: 30 meq  Na+: 137 meq  Fluid Removal: 2     Specimen Type:   Specimen Source:   Specimen Taken Date:   Specimen Taken Time:                  Authorizing Provider:Suraj Victoria MD  Authorizing Provider's NPI: [id number]  Order Entered By: Suraj Victoria MD 4/4/2024  6:57 AM     Electronically signed by: Suraj Victoria MD 4/4/2024  6:57 AM

## 2024-04-05 ENCOUNTER — APPOINTMENT (OUTPATIENT)
Dept: NEPHROLOGY | Facility: HOSPITAL | Age: 59
End: 2024-04-05
Payer: COMMERCIAL

## 2024-04-05 ENCOUNTER — READMISSION MANAGEMENT (OUTPATIENT)
Dept: CALL CENTER | Facility: HOSPITAL | Age: 59
End: 2024-04-05
Payer: COMMERCIAL

## 2024-04-05 VITALS
DIASTOLIC BLOOD PRESSURE: 68 MMHG | HEART RATE: 71 BPM | OXYGEN SATURATION: 95 % | SYSTOLIC BLOOD PRESSURE: 151 MMHG | WEIGHT: 310.85 LBS | BODY MASS INDEX: 43.35 KG/M2 | TEMPERATURE: 98.7 F | RESPIRATION RATE: 18 BRPM

## 2024-04-05 LAB
GLUCOSE BLDC GLUCOMTR-MCNC: 140 MG/DL (ref 70–130)
GLUCOSE BLDC GLUCOMTR-MCNC: 147 MG/DL (ref 70–130)
GLUCOSE BLDC GLUCOMTR-MCNC: 155 MG/DL (ref 70–130)
IRON 24H UR-MRATE: 46 MCG/DL (ref 59–158)
IRON SATN MFR SERPL: 18 % (ref 20–50)
TIBC SERPL-MCNC: 262 MCG/DL (ref 298–536)
TRANSFERRIN SERPL-MCNC: 176 MG/DL (ref 200–360)

## 2024-04-05 PROCEDURE — 25010000002 HEPARIN (PORCINE) PER 1000 UNITS: Performed by: FAMILY MEDICINE

## 2024-04-05 PROCEDURE — 25010000002 HEPARIN (PORCINE) PER 1000 UNITS: Performed by: INTERNAL MEDICINE

## 2024-04-05 PROCEDURE — 83540 ASSAY OF IRON: CPT | Performed by: INTERNAL MEDICINE

## 2024-04-05 PROCEDURE — 25010000002 NA FERRIC GLUC CPLX PER 12.5 MG: Performed by: INTERNAL MEDICINE

## 2024-04-05 PROCEDURE — 84466 ASSAY OF TRANSFERRIN: CPT | Performed by: INTERNAL MEDICINE

## 2024-04-05 PROCEDURE — 82948 REAGENT STRIP/BLOOD GLUCOSE: CPT

## 2024-04-05 PROCEDURE — 99239 HOSP IP/OBS DSCHRG MGMT >30: CPT | Performed by: INTERNAL MEDICINE

## 2024-04-05 RX ORDER — CARVEDILOL 3.12 MG/1
3.12 TABLET ORAL 2 TIMES DAILY WITH MEALS
Qty: 60 TABLET | Refills: 0 | Status: SHIPPED | OUTPATIENT
Start: 2024-04-05 | End: 2024-05-05

## 2024-04-05 RX ORDER — CARVEDILOL 6.25 MG/1
6.25 TABLET ORAL 2 TIMES DAILY WITH MEALS
Status: DISCONTINUED | OUTPATIENT
Start: 2024-04-05 | End: 2024-04-05 | Stop reason: HOSPADM

## 2024-04-05 RX ORDER — HYDRALAZINE HYDROCHLORIDE 25 MG/1
25 TABLET, FILM COATED ORAL 3 TIMES DAILY
Qty: 90 TABLET | Refills: 0 | Status: SHIPPED | OUTPATIENT
Start: 2024-04-05 | End: 2024-04-05 | Stop reason: HOSPADM

## 2024-04-05 RX ORDER — HYDRALAZINE HYDROCHLORIDE 50 MG/1
50 TABLET, FILM COATED ORAL 3 TIMES DAILY
Qty: 90 TABLET | Refills: 0 | Status: SHIPPED | OUTPATIENT
Start: 2024-04-05 | End: 2024-05-05

## 2024-04-05 RX ORDER — ALBUMIN (HUMAN) 12.5 G/50ML
12.5 SOLUTION INTRAVENOUS AS NEEDED
Status: DISCONTINUED | OUTPATIENT
Start: 2024-04-05 | End: 2024-04-05 | Stop reason: HOSPADM

## 2024-04-05 RX ORDER — HYDRALAZINE HYDROCHLORIDE 50 MG/1
50 TABLET, FILM COATED ORAL 3 TIMES DAILY
Status: DISCONTINUED | OUTPATIENT
Start: 2024-04-05 | End: 2024-04-05 | Stop reason: HOSPADM

## 2024-04-05 RX ORDER — AMLODIPINE BESYLATE 10 MG/1
10 TABLET ORAL DAILY
Qty: 30 TABLET | Refills: 0 | Status: SHIPPED | OUTPATIENT
Start: 2024-04-05 | End: 2024-05-05

## 2024-04-05 RX ORDER — ALBUMIN (HUMAN) 12.5 G/50ML
12.5 SOLUTION INTRAVENOUS AS NEEDED
Status: DISCONTINUED | OUTPATIENT
Start: 2024-04-05 | End: 2024-04-05 | Stop reason: SDUPTHER

## 2024-04-05 RX ADMIN — CARVEDILOL 3.12 MG: 3.12 TABLET, FILM COATED ORAL at 12:17

## 2024-04-05 RX ADMIN — HEPARIN SODIUM 5000 UNITS: 5000 INJECTION INTRAVENOUS; SUBCUTANEOUS at 12:19

## 2024-04-05 RX ADMIN — BENZONATATE 200 MG: 100 CAPSULE ORAL at 08:13

## 2024-04-05 RX ADMIN — BUMETANIDE 2 MG: 1 TABLET ORAL at 12:09

## 2024-04-05 RX ADMIN — HEPARIN SODIUM 1000 UNITS: 1000 INJECTION INTRAVENOUS; SUBCUTANEOUS at 11:41

## 2024-04-05 RX ADMIN — AMLODIPINE BESYLATE 10 MG: 10 TABLET ORAL at 12:09

## 2024-04-05 RX ADMIN — SODIUM CHLORIDE 125 MG: 9 INJECTION, SOLUTION INTRAVENOUS at 13:12

## 2024-04-05 RX ADMIN — Medication 10 ML: at 12:13

## 2024-04-05 RX ADMIN — HYDRALAZINE HYDROCHLORIDE 25 MG: 25 TABLET ORAL at 12:09

## 2024-04-05 NOTE — PROGRESS NOTES
LOS: 3 days   Patient Care Team:  Larissa Alexandre APRN as PCP - General (Nurse Practitioner)  Toñito Almazan MD as Cardiologist (Cardiology)    Chief Complaint: CKD stage V now ESRD    Subjective     3rd Hd session today. Tolerating well. UF planned 3.5liter as tolerated.     Subjective:  Symptoms:  Stable.  No shortness of breath or chest pain.    Diet:  Adequate intake.        History taken from: patient    Objective     Vital Sign Min/Max for last 24 hours  Temp  Min: 97.6 °F (36.4 °C)  Max: 98.8 °F (37.1 °C)   BP  Min: 143/100  Max: 183/100   Pulse  Min: 70  Max: 83   Resp  Min: 16  Max: 22   SpO2  Min: 89 %  Max: 97 %   No data recorded   No data recorded     Flowsheet Rows      Flowsheet Row First Filed Value   Admission Height --   Admission Weight 141 kg (310 lb 13.6 oz) Documented at 04/02/2024 1854            No intake/output data recorded.  I/O last 3 completed shifts:  In: 590 [P.O.:590]  Out: 5570 [Urine:3550]    Objective:  General Appearance:  Comfortable.    Vital signs: (most recent): Blood pressure 169/80, pulse 75, temperature 98.7 °F (37.1 °C), temperature source Axillary, resp. rate 22, weight (!) 141 kg (310 lb 13.6 oz), SpO2 94%.  Vital signs are normal.    Output: Producing urine.    HEENT: Normal HEENT exam.    Lungs:  Normal effort and normal respiratory rate.  Breath sounds clear to auscultation.  He is not in respiratory distress.  No decreased breath sounds or wheezes.    Heart: Normal rate.  Regular rhythm.  S1 normal and S2 normal.  No murmur or gallop.   Abdomen: Abdomen is soft.    Extremities: There is dependent edema and local swelling.    Pulses: Distal pulses are intact.    Neurological: Patient is alert and oriented to person, place and time.  Normal strength.    Pupils:  Pupils are equal, round, and reactive to light.                Results Review:     I reviewed the patient's new clinical results.    WBC WBC   Date Value Ref Range Status   04/03/2024 8.12 3.40 - 10.80  "10*3/mm3 Final   04/02/2024 10.11 3.40 - 10.80 10*3/mm3 Final      HGB Hemoglobin   Date Value Ref Range Status   04/03/2024 8.0 (L) 13.0 - 17.7 g/dL Final   04/02/2024 8.8 (L) 13.0 - 17.7 g/dL Final      HCT Hematocrit   Date Value Ref Range Status   04/03/2024 25.6 (L) 37.5 - 51.0 % Final   04/02/2024 27.7 (L) 37.5 - 51.0 % Final      Platlets No results found for: \"LABPLAT\"   MCV MCV   Date Value Ref Range Status   04/03/2024 91.1 79.0 - 97.0 fL Final   04/02/2024 90.5 79.0 - 97.0 fL Final          Sodium Sodium   Date Value Ref Range Status   04/03/2024 142 136 - 145 mmol/L Final   04/02/2024 145 136 - 145 mmol/L Final      Potassium Potassium   Date Value Ref Range Status   04/03/2024 5.0 3.5 - 5.2 mmol/L Final   04/02/2024 4.5 3.5 - 5.2 mmol/L Final      Chloride Chloride   Date Value Ref Range Status   04/03/2024 107 98 - 107 mmol/L Final   04/02/2024 108 (H) 98 - 107 mmol/L Final      CO2 CO2   Date Value Ref Range Status   04/03/2024 19.0 (L) 22.0 - 29.0 mmol/L Final   04/02/2024 20.0 (L) 22.0 - 29.0 mmol/L Final      BUN BUN   Date Value Ref Range Status   04/03/2024 67 (H) 6 - 20 mg/dL Final   04/02/2024 66 (H) 6 - 20 mg/dL Final      Creatinine Creatinine   Date Value Ref Range Status   04/03/2024 8.71 (H) 0.76 - 1.27 mg/dL Final   04/02/2024 8.85 (H) 0.76 - 1.27 mg/dL Final      Calcium Calcium   Date Value Ref Range Status   04/03/2024 8.2 (L) 8.6 - 10.5 mg/dL Final   04/02/2024 8.6 8.6 - 10.5 mg/dL Final      PO4 No results found for: \"CAPO4\"   Albumin Albumin   Date Value Ref Range Status   04/03/2024 3.4 (L) 3.5 - 5.2 g/dL Final   04/02/2024 3.6 3.5 - 5.2 g/dL Final      Magnesium Magnesium   Date Value Ref Range Status   04/03/2024 2.5 1.6 - 2.6 mg/dL Final   04/02/2024 2.5 1.6 - 2.6 mg/dL Final      Uric Acid No results found for: \"URICACID\"     Medication Review: Yes    Assessment & Plan       ESRD needing dialysis      Assessment & Plan    CKD stage V ( Now ESRD) Etiology diabetic nephropathy. " Started on iHD for optimization of volume status on this admission 4/3/24 through [facility].      Volume status: 2+ edema b/l LE. Optimization with HD. Continue with diuretics     Met acidosis: Due to advance CKD. Expect improvement with HD.     Anemia: ACD due to CKD. Will need DARNELL and Iron per protocol in dialysis clinic      DM: Hx of poorly controlled DM and diabetic nephropathy with proteinuria.      Recs  Tolerated three HD sessions on this admission Stable for discharge  SW consult to establish care with [facility] ([facility] program). Patient will be doing intermittent HD in transitional unit alongside home hemo dialysis training starting Tue 4/9/24  DARNELL on HD days.  Continue with diuretics.   Continue with antiHTN meds.   Appointment with [facility] Surgery on April 9th for AV access placement.  Need to watch fluid intake after discharge 1.5liter/day     I discussed the patients findings and my recommendations with patient       Suraj Victoria MD  04/05/24  10:40 EDT

## 2024-04-05 NOTE — DISCHARGE INSTR - OTHER ORDERS
Please go to [facility]  [address] [place] @ 0900 on Tuesday 4/9/24 for HD and to be taught home HD.    Subjective   Patient ID: Cindy Chirinos is a 34 y.o. female who presents for Annual Exam.  HPI  Cindy presents for annual visit.  She was seen by cardiology within last month for near syncope.  She is wearing holter monitor.  Was advised to increase fluid intake which has helped symptoms.    Cindy still suffers from seasonal affective disorder, coinciding with her most busy and stressful time of year.  Review of Systems   Constitutional:  Negative for fatigue and unexpected weight change.   Respiratory:  Negative for shortness of breath.    Cardiovascular:  Negative for chest pain and palpitations.   Genitourinary:  Negative for menstrual problem.   Psychiatric/Behavioral:  Positive for dysphoric mood (seasonal affective disorder).        Objective   Vitals:    12/15/23 0820   BP: 116/73   Pulse: 81   SpO2: 100%   Weight: 67.5 kg (148 lb 12.8 oz)      Physical Exam  Constitutional:       Appearance: Normal appearance.   HENT:      Head: Normocephalic and atraumatic.   Eyes:      Extraocular Movements: Extraocular movements intact.      Pupils: Pupils are equal, round, and reactive to light.   Cardiovascular:      Rate and Rhythm: Normal rate and regular rhythm.   Pulmonary:      Effort: Pulmonary effort is normal.      Breath sounds: Normal breath sounds.   Abdominal:      General: There is no distension.      Tenderness: There is no abdominal tenderness.   Musculoskeletal:      Cervical back: Normal range of motion and neck supple.   Neurological:      General: No focal deficit present.      Mental Status: She is alert and oriented to person, place, and time.   Psychiatric:         Mood and Affect: Mood normal.         Behavior: Behavior normal.         Assessment/Plan   There are no diagnoses linked to this encounter.    Problem List Items Addressed This Visit             ICD-10-CM    Near syncope R55     Will await results of holter monitor.  If cost of echocardiogram is over $1800, I do not feel necessary to  complete at this time, especially if increasing fluid intake is helping.         Seasonal affective disorder (CMS/[facility]) F33.8     Discussed using light therapy, if not successful to let me know.          Other Visit Diagnoses         Codes    Well woman exam without gynecological exam    -  Primary Z00.00    Will update influenza immunization.  Overall is in excellent shape.     Relevant Orders    Follow Up In Primary Care - Health Maintenance    Need for immunization against influenza     Z23    Relevant Orders    Flu vaccine (IIV4) age 6 months and greater, preservative free

## 2024-04-05 NOTE — CASE MANAGEMENT/SOCIAL WORK
Continued Stay Note  [facility]     Patient Name: Amrita Corbin See II  MRN: [id number]  Today's Date: 4/5/2024    Admit Date: 4/2/2024    Plan: HOme   Discharge Plan       Row Name 04/05/24 0826       Plan    Plan HOme    Patient/Family in Agreement with Plan other (see comments)    Plan Comments I called [facility], a coordinator has not been appointed @ this time, awaiting contact from coordinator. I will fax required information to [facility] for inpatient dialysis HD chair time, none has been assigned @ this time.    Final Discharge Disposition Code 01 - home or self-care                   Discharge Codes    No documentation.                 Expected Discharge Date and Time       Expected Discharge Date Expected Discharge Time    Apr 5, 2024               Donovan Sims RN

## 2024-04-05 NOTE — PAYOR COMM NOTE
"Nicole Buckley II (58 y.o. Male)       Date of Birth   1965    Social Security Number       Address   [address] [place] [address]    Home Phone   [phone]    MRN   [id number]       [facility]    Marital Status                               Admission Date   4/2/24    Admission Type   Urgent    Admitting Provider   Yasmine Mckinnon DO    Attending Provider   Yasmine Mckinnon DO    Department, Room/Bed   [facility] 4G, S445/1       Discharge Date       Discharge Disposition       Discharge Destination                                 Attending Provider: Yasmine Mckinnon DO    Allergies: No Known Allergies    Isolation: None   Infection: None   Code Status: CPR    Ht: 180.3 cm (71\")   Wt: 141 kg (310 lb 13.6 oz)    Admission Cmt: None   Principal Problem: ESRD needing dialysis [N18.6,Z99.2]                   Active Insurance as of 4/2/2024       Primary Coverage       Payor Plan Insurance Group Employer/Plan Group    MISC COMMERCIAL MISC COMMERCIAL [id number]       Coverage Address Coverage Phone Number Coverage Fax Number Effective Dates    [address] [phone]  11/1/2023 - None Entered    [place] [id number]         Subscriber Name Subscriber Birth Date Member ID       NICOLE BUCKLEY II 1965 [id number]                     Emergency Contacts        (Rel.) Home Phone Work Phone Mobile Phone    lyssa buckley (Spouse) [phone] -- --              [place]: NPI [id number] Tax ID [id number]  Insurance Information                  MISC COMMERCIAL/MISC COMMERCIAL Phone: [phone]    Subscriber: Nicole Buckley II Subscriber#: [id number]    Group#: [id number] Precert#: [id number]          Current Facility-Administered Medications   Medication Dose Route Frequency Provider Last Rate Last Admin    acetaminophen (TYLENOL) tablet 650 mg  650 mg Oral Q4H PRN DON Jon DO        albumin human 25 % IV SOLN 12.5 g  12.5 g Intravenous PRN " Suraj Victoria MD        amLODIPine (NORVASC) tablet 10 mg  10 mg Oral Daily DON Jon DO   10 mg at 04/05/24 1209    atorvastatin (LIPITOR) tablet 10 mg  10 mg Oral Nightly DON Jon DO   10 mg at 04/04/24 2016    benzonatate (TESSALON) capsule 200 mg  200 mg Oral TID PRN Lesia Cummings, DO   200 mg at 04/05/24 0813    sennosides-docusate (PERICOLACE) 8.6-50 MG per tablet 2 tablet  2 tablet Oral BID PRN DON Jon DO        And    polyethylene glycol (MIRALAX) packet 17 g  17 g Oral Daily PRN DON Jon DO        And    bisacodyl (DULCOLAX) EC tablet 5 mg  5 mg Oral Daily PRN DON Jon DO        And    bisacodyl (DULCOLAX) suppository 10 mg  10 mg Rectal Daily PRN DON Jon DO        bumetanide (BUMEX) tablet 2 mg  2 mg Oral BID DON Jon DO   2 mg at 04/05/24 1209    carvedilol (COREG) tablet 6.25 mg  6.25 mg Oral BID With Meals Yasmine Mckinnon,         dextrose (D50W) (25 g/50 mL) IV injection 25 g  25 g Intravenous Q15 Min PRN DON Jon DO        dextrose (GLUTOSE) oral gel 15 g  15 g Oral Q15 Min PRN DON Jon DO        fentaNYL citrate (PF) (SUBLIMAZE) injection   Intravenous PRN Jorgito Johnston III, MD   50 mcg at 04/03/24 1449    glucagon (GLUCAGEN) injection 1 mg  1 mg Intramuscular Q15 Min PRN DON Jon DO        heparin (porcine) 5000 UNIT/ML injection 5,000 Units  5,000 Units Subcutaneous Q12H DON Jon DO   5,000 Units at 04/05/24 1219    heparin (porcine) injection 1,000 Units  1,000 Units Intracatheter PRN Suraj Victoria MD   1,000 Units at 04/05/24 1141    hydrALAZINE (APRESOLINE) injection 10 mg  10 mg Intravenous Q6H PRN DON Jon DO   10 mg at 04/02/24 2331    hydrALAZINE (APRESOLINE) tablet 50 mg  50 mg Oral TID Yasmine Mckinnon,         Insulin Lispro (humaLOG) injection 2-9 Units  2-9 Units Subcutaneous 4x Daily AC & at Bedtime DON Jon, DO   2 Units at 04/04/24 2012    midazolam  (VERSED) injection   Intravenous PRN Jorgito Johnston III, MD   1 mg at 04/03/24 1449    nitroglycerin (NITROSTAT) SL tablet 0.4 mg  0.4 mg Sublingual Q5 Min PRN DON Jon, DO        ondansetron (ZOFRAN) injection 4 mg  4 mg Intravenous Q6H PRN DON Jon, DO        sodium chloride 0.9 % flush 10 mL  10 mL Intravenous Q12H DON Jon, DO   10 mL at 04/05/24 1213    sodium chloride 0.9 % flush 10 mL  10 mL Intravenous PRN DON Jon, DO        sodium chloride 0.9 % infusion 40 mL  40 mL Intravenous PRN DON Jon, DO         Lab Results (last 24 hours)       Procedure Component Value Units Date/Time    Iron Profile [[id number]]  (Abnormal) Collected: 04/05/24 1159    Specimen: Blood Updated: 04/05/24 1228     Iron 46 mcg/dL      Iron Saturation (TSAT) 18 %      Transferrin 176 mg/dL      TIBC 262 mcg/dL     POC Glucose Once [[id number]]  (Abnormal) Collected: 04/05/24 1207    Specimen: Blood Updated: 04/05/24 1209     Glucose 147 mg/dL     POC Glucose Once [[id number]]  (Abnormal) Collected: 04/05/24 0651    Specimen: Blood Updated: 04/05/24 0652     Glucose 140 mg/dL     POC Glucose Once [[id number]]  (Abnormal) Collected: 04/04/24 2005    Specimen: Blood Updated: 04/04/24 2006     Glucose 190 mg/dL     POC Glucose Once [[id number]]  (Normal) Collected: 04/04/24 1551    Specimen: Blood Updated: 04/04/24 1552     Glucose 118 mg/dL           Imaging Results (Last 24 Hours)       Procedure Component Value Units Date/Time    IR Insert Tunneled CV Catheter Without Port 5 Plus [[id number]] Collected: 04/05/24 0756     Updated: 04/05/24 0802    Narrative:        IR INS TUNNELED CV CATHETER WO PORT 5 PLUS    Date of Exam: 4/3/2024 2:15 PM EDT    Indication: CKD stage V, Need for HD cath.    Comparison: None available.    Technique: The procedure, risks and options were discussed with the patient and informed written consent was obtained.  The patient was placed in the supine position in the  angiography suite. The right neck and anterior chest wall were prepped and draped. Ultrasound was performed of the right neck and a site was chosen over the internal jugular vein. The skin was   anesthetized with 1% lidocaine. A micropuncture was performed. Following placement of an 035 wire, serial dilatation was performed and a 16 French introducer sheath was placed. A site was then chosen over the right anterior chest wall and the skin was   anesthetized with 1% Xylocaine. The catheter was tunneled to the right IJ site and subsequently deployed with the tip in the right atrium. The catheter was flushed with heparin solution and sterile dressings applied. A single spot radiograph was obtained   documenting placement. IV conscious sedation was administered consisting of Versed and fentanyl. The patient was monitored by the IR nurse during the entire procedure. Total physician monitored sedation time was 13 minutes.    Fluoroscopic Time: 42 seconds      Impression:      Impression:  Successful placement of a right-sided tunneled dialysis catheter with the tip in the right atrium under ultrasound and fluoroscopy.      Electronically Signed: Jorgito Johnston MD    4/5/2024 7:59 AM EDT    Workstation ID: [id number]          Orders (last 24 hrs)        Start     Ordered    04/05/24 1800  carvedilol (COREG) tablet 6.25 mg  2 Times Daily With Meals         04/05/24 1355    04/05/24 1600  hydrALAZINE (APRESOLINE) tablet 50 mg  3 Times Daily         04/05/24 1355    04/05/24 1345  ferric gluconate (FERRLECIT)125 MG in sodium chloride 0.9 % 100 mL IVPB  Once         04/05/24 1254    04/05/24 1210  POC Glucose Once  PROCEDURE ONCE        Comments: Complete no more than 45 minutes prior to patient eating      04/05/24 1207    04/05/24 0815  Iron Profile  Once         04/05/24 0814    04/05/24 0807  albumin human 25 % IV SOLN 12.5 g  As Needed         04/05/24 0807    04/05/24 0807  albumin human 25 % IV SOLN 12.5 g  As Needed,    Status:  Discontinued         04/05/24 0807    04/05/24 0653  POC Glucose Once  PROCEDURE ONCE        Comments: Complete no more than 45 minutes prior to patient eating      04/05/24 0651    04/05/24 0000  Hemodialysis Inpatient  Once         04/04/24 1629    04/05/24 0000  amLODIPine (NORVASC) 10 MG tablet  Daily         04/05/24 1352    04/05/24 0000  hydrALAZINE (APRESOLINE) 25 MG tablet  3 Times Daily,   Status:  Discontinued         04/05/24 1352    04/05/24 0000  carvedilol (COREG) 3.125 MG tablet  2 Times Daily With Meals         04/05/24 1352    04/05/24 0000  hydrALAZINE (APRESOLINE) 50 MG tablet  3 Times Daily         04/05/24 1357    04/04/24 2007  POC Glucose Once  PROCEDURE ONCE        Comments: Complete no more than 45 minutes prior to patient eating      04/04/24 2005    04/04/24 1553  POC Glucose Once  PROCEDURE ONCE        Comments: Complete no more than 45 minutes prior to patient eating      04/04/24 1551    04/04/24 0241  benzonatate (TESSALON) capsule 200 mg  3 Times Daily PRN         04/04/24 0242    04/03/24 1800  carvedilol (COREG) tablet 3.125 mg  2 Times Daily With Meals,   Status:  Discontinued         04/03/24 1204    04/03/24 1733  heparin (porcine) injection 1,000 Units  As Needed         04/03/24 1733    04/03/24 1623  albumin human 25 % IV SOLN 12.5 g  As Needed         04/03/24 1623    04/03/24 1449  fentaNYL citrate (PF) (SUBLIMAZE) injection  As Needed         04/03/24 1450    04/03/24 1449  midazolam (VERSED) injection  As Needed         04/03/24 1450    04/03/24 0900  amLODIPine (NORVASC) tablet 10 mg  Daily         04/02/24 2209 04/03/24 0800  Oral Care  2 Times Daily       04/02/24 1855    04/02/24 2300  bumetanide (BUMEX) tablet 2 mg  2 Times Daily         04/02/24 2209 04/02/24 2215  atorvastatin (LIPITOR) tablet 10 mg  Nightly         04/02/24 2209 04/02/24 2200  Incentive Spirometry  Every 4 Hours While Awake       04/02/24 1855    04/02/24 2200  POC Glucose 4x  "Daily Before Meals & at Bedtime  4 Times Daily Before Meals & at Bedtime      Comments: Complete no more than 45 minutes prior to patient eating      04/02/24 1856 04/02/24 2115  hydrALAZINE (APRESOLINE) tablet 25 mg  3 Times Daily,   Status:  Discontinued         04/02/24 2019 04/02/24 2100  sodium chloride 0.9 % flush 10 mL  Every 12 Hours Scheduled         04/02/24 1855 04/02/24 2100  heparin (porcine) 5000 UNIT/ML injection 5,000 Units  Every 12 Hours Scheduled         04/02/24 1855 04/02/24 2100  Insulin Lispro (humaLOG) injection 2-9 Units  4 Times Daily Before Meals & Nightly         04/02/24 1856 04/02/24 2019  hydrALAZINE (APRESOLINE) injection 10 mg  Every 6 Hours PRN         04/02/24 2019 04/02/24 2000  Vital Signs  Every 4 Hours       04/02/24 1855 04/02/24 1856  dextrose (GLUTOSE) oral gel 15 g  Every 15 Minutes PRN         04/02/24 1856 04/02/24 1856  dextrose (D50W) (25 g/50 mL) IV injection 25 g  Every 15 Minutes PRN         04/02/24 1856 04/02/24 1856  glucagon (GLUCAGEN) injection 1 mg  Every 15 Minutes PRN         04/02/24 1856 04/02/24 1855  ondansetron (ZOFRAN) injection 4 mg  Every 6 Hours PRN         04/02/24 1855 04/02/24 1854  sennosides-docusate (PERICOLACE) 8.6-50 MG per tablet 2 tablet  2 Times Daily PRN        Placed in \"And\" Linked Group    04/02/24 1855 04/02/24 1854  polyethylene glycol (MIRALAX) packet 17 g  Daily PRN        Placed in \"And\" Linked Group    04/02/24 1855 04/02/24 1854  bisacodyl (DULCOLAX) EC tablet 5 mg  Daily PRN        Placed in \"And\" Linked Group    04/02/24 1855 04/02/24 1854  bisacodyl (DULCOLAX) suppository 10 mg  Daily PRN        Placed in \"And\" Linked Group    04/02/24 1855 04/02/24 1854  acetaminophen (TYLENOL) tablet 650 mg  Every 4 Hours PRN         04/02/24 1855    04/02/24 1854  Intake & Output  Every Shift       04/02/24 1855 04/02/24 1853  nitroglycerin (NITROSTAT) SL tablet 0.4 mg  Every 5 Minutes PRN    "      04/02/24 1855 04/02/24 1853  sodium chloride 0.9 % flush 10 mL  As Needed         04/02/24 1855 04/02/24 1853  sodium chloride 0.9 % infusion 40 mL  As Needed         04/02/24 1855    Unscheduled  Up in Chair  As Needed       04/02/24 1855    Unscheduled  Follow Hypoglycemia Standing Orders For Blood Glucose <70 & Notify Provider of Treatment  As Needed      Comments: Follow Hypoglycemia Orders As Outlined in Process Instructions (Open Order Report to View Full Instructions)  Notify Provider Any Time Hypoglycemia Treatment is Administered    04/02/24 1856                     Physician Progress Notes (last 24 hours)        Suraj Victoria MD at 04/05/24 1040           LOS: 3 days   Patient Care Team:  Larissa Alexandre APRN as PCP - General (Nurse Practitioner)  Toñito Almazan MD as Cardiologist (Cardiology)    Chief Complaint: CKD stage V now ESRD    Subjective     3rd Hd session today. Tolerating well. UF planned 3.5liter as tolerated.     Subjective:  Symptoms:  Stable.  No shortness of breath or chest pain.    Diet:  Adequate intake.        History taken from: patient    Objective     Vital Sign Min/Max for last 24 hours  Temp  Min: 97.6 °F (36.4 °C)  Max: 98.8 °F (37.1 °C)   BP  Min: 143/100  Max: 183/100   Pulse  Min: 70  Max: 83   Resp  Min: 16  Max: 22   SpO2  Min: 89 %  Max: 97 %   No data recorded   No data recorded     Flowsheet Rows      Flowsheet Row First Filed Value   Admission Height --   Admission Weight 141 kg (310 lb 13.6 oz) Documented at 04/02/2024 1854            No intake/output data recorded.  I/O last 3 completed shifts:  In: 590 [P.O.:590]  Out: 5570 [Urine:3550]    Objective:  General Appearance:  Comfortable.    Vital signs: (most recent): Blood pressure 169/80, pulse 75, temperature 98.7 °F (37.1 °C), temperature source Axillary, resp. rate 22, weight (!) 141 kg (310 lb 13.6 oz), SpO2 94%.  Vital signs are normal.    Output: Producing urine.    HEENT: Normal HEENT exam.    Lungs:   "Normal effort and normal respiratory rate.  Breath sounds clear to auscultation.  He is not in respiratory distress.  No decreased breath sounds or wheezes.    Heart: Normal rate.  Regular rhythm.  S1 normal and S2 normal.  No murmur or gallop.   Abdomen: Abdomen is soft.    Extremities: There is dependent edema and local swelling.    Pulses: Distal pulses are intact.    Neurological: Patient is alert and oriented to person, place and time.  Normal strength.    Pupils:  Pupils are equal, round, and reactive to light.                Results Review:     I reviewed the patient's new clinical results.    WBC WBC   Date Value Ref Range Status   04/03/2024 8.12 3.40 - 10.80 10*3/mm3 Final   04/02/2024 10.11 3.40 - 10.80 10*3/mm3 Final      HGB Hemoglobin   Date Value Ref Range Status   04/03/2024 8.0 (L) 13.0 - 17.7 g/dL Final   04/02/2024 8.8 (L) 13.0 - 17.7 g/dL Final      HCT Hematocrit   Date Value Ref Range Status   04/03/2024 25.6 (L) 37.5 - 51.0 % Final   04/02/2024 27.7 (L) 37.5 - 51.0 % Final      Platlets No results found for: \"LABPLAT\"   MCV MCV   Date Value Ref Range Status   04/03/2024 91.1 79.0 - 97.0 fL Final   04/02/2024 90.5 79.0 - 97.0 fL Final          Sodium Sodium   Date Value Ref Range Status   04/03/2024 142 136 - 145 mmol/L Final   04/02/2024 145 136 - 145 mmol/L Final      Potassium Potassium   Date Value Ref Range Status   04/03/2024 5.0 3.5 - 5.2 mmol/L Final   04/02/2024 4.5 3.5 - 5.2 mmol/L Final      Chloride Chloride   Date Value Ref Range Status   04/03/2024 107 98 - 107 mmol/L Final   04/02/2024 108 (H) 98 - 107 mmol/L Final      CO2 CO2   Date Value Ref Range Status   04/03/2024 19.0 (L) 22.0 - 29.0 mmol/L Final   04/02/2024 20.0 (L) 22.0 - 29.0 mmol/L Final      BUN BUN   Date Value Ref Range Status   04/03/2024 67 (H) 6 - 20 mg/dL Final   04/02/2024 66 (H) 6 - 20 mg/dL Final      Creatinine Creatinine   Date Value Ref Range Status   04/03/2024 8.71 (H) 0.76 - 1.27 mg/dL Final " "  04/02/2024 8.85 (H) 0.76 - 1.27 mg/dL Final      Calcium Calcium   Date Value Ref Range Status   04/03/2024 8.2 (L) 8.6 - 10.5 mg/dL Final   04/02/2024 8.6 8.6 - 10.5 mg/dL Final      PO4 No results found for: \"CAPO4\"   Albumin Albumin   Date Value Ref Range Status   04/03/2024 3.4 (L) 3.5 - 5.2 g/dL Final   04/02/2024 3.6 3.5 - 5.2 g/dL Final      Magnesium Magnesium   Date Value Ref Range Status   04/03/2024 2.5 1.6 - 2.6 mg/dL Final   04/02/2024 2.5 1.6 - 2.6 mg/dL Final      Uric Acid No results found for: \"URICACID\"     Medication Review: Yes    Assessment & Plan       ESRD needing dialysis      Assessment & Plan    CKD stage V ( Now ESRD) Etiology diabetic nephropathy. Started on iHD for optimization of volume status on this admission 4/3/24 through [facility].      Volume status: 2+ edema b/l LE. Optimization with HD. Continue with diuretics     Met acidosis: Due to advance CKD. Expect improvement with HD.     Anemia: ACD due to CKD. Will need DARNELL and Iron per protocol in dialysis clinic      DM: Hx of poorly controlled DM and diabetic nephropathy with proteinuria.      Recs  Tolerated three HD sessions on this admission Stable for discharge  [facility] Patient will be doing intermittent HD in transitional unit alongside home hemo dialysis training starting Tue 4/9/24  DARNELL on HD days.  Continue with diuretics.   Continue with antiHTN meds.   Appointment with [facility] on April 9th for AV access placement.  Need to watch fluid intake after discharge 1.5liter/day     I discussed the patients findings and my recommendations with patient       Suraj Victoria MD  04/05/24  10:40 EDT              Electronically signed by Suraj Victoria MD at 04/05/24 1041       Consult Notes (last 24 hours)  Notes from 04/04/24 1427 through 04/05/24 1427   No notes of this type exist for this encounter.     DC planning notes:  04/04/24  Started HD referral, [facility]  Fax " [phone], [facility]  to call tomorrow. See fax machine for checklist.  I sent some stuff to [facility] as they requested. I didn't want to send stuff until it was confirmed they ([facility])accept  4/3/2024  Lives in [facility] w/spouse.   Ind.   Has Cpap, Margo @ home. Denies other DME, Home O2, HH.  Employed but on medical leave @ this time.  Plan for HD access and start HD here, has not had in past, according to patient has been discussing this w/nephrologist.   D/C plan is to return home w/spouse transport.  Email from Crystal Piedra from [facility] to notify Marii @ [facility] if becomes inpatient from OBS Number [phone]. I chat messaged and forwarded email to Alicja Rajan. .Donovan Sims RN

## 2024-04-05 NOTE — DISCHARGE SUMMARY
[facility] Medicine Services  DISCHARGE SUMMARY    Patient Name: Amrita Buckley II  : 1965  MRN: [id number]    Date of Admission: 2024  6:09 PM  Date of Discharge: 2024  Primary Care Physician: Larissa Alexandre APRN    Consults       Date and Time Order Name Status Description    2024  6:31 PM Inpatient Nephrology Consult              Hospital Course     Presenting Problem: ESRD    Active Hospital Problems    Diagnosis  POA   • **ESRD needing dialysis [N18.6, Z99.2]  Yes      Resolved Hospital Problems   No resolved problems to display.          Hospital Course:  Amrita Buckley II is a 58 y.o. male with history of hypertension, DM2 CKD sent by nephrology for uremia and need to initiate HD.  He underwent 3 treatment sessions as first start with followup for possible transition to home hemo.     CKD with progression to ESRD  -Nephrology consulted, first HD 4/3  -S/p tunneled catheter on 4/3 by IR  -Has outpatient appointment next Tuesday,  with Dr. Allen for fistula eval  -Waiting to hear back from [employer] regarding outpatient chair time, follow-up prior to DC     DM2  -Last A1c 5.9     Hypertension  -Continue current    Dyslipidemia  -continue statin     MOY  Obesity      Discharge Follow Up Recommendations for outpatient labs/diagnostics:  PCP in 1 week  Dr. Allen on   Outpatient dialysis clinic    Day of Discharge     HPI:   Seen on HD, tolerating, no complaints    Review of Systems  Gen- No fevers, chills  CV- No chest pain, palpitations  Resp- No cough, dyspnea  GI- No N/V/D, abd pain      Vital Signs:   Temp:  [97.6 °F (36.4 °C)-98.9 °F (37.2 °C)] 98.4 °F (36.9 °C)  Heart Rate:  [70-83] 75  Resp:  [16-23] 20  BP: (149-183)/() 173/73      Physical Exam:  Constitutional: No acute distress, awake, alert  HENT: NCAT, mucous membranes moist  Respiratory: Respiratory effort normal   Cardiovascular: RRR, no murmurs, rubs, or gallops  Musculoskeletal: No  bilateral ankle edema  Psychiatric: Appropriate affect, cooperative  Neurologic: Oriented x 3, speech clear  Skin: No rashes      Pertinent  and/or Most Recent Results     LAB RESULTS:      Lab 04/03/24  0751 04/03/24  0600 04/02/24 1918   WBC  --  8.12 10.11   HEMOGLOBIN  --  8.0* 8.8*   HEMATOCRIT  --  25.6* 27.7*   PLATELETS  --  305 329   NEUTROS ABS  --  6.06 7.26*   IMMATURE GRANS (ABS)  --  0.03 0.03   LYMPHS ABS  --  1.09 1.57   MONOS ABS  --  0.69 0.96*   EOS ABS  --  0.18 0.22   MCV  --  91.1 90.5   PROCALCITONIN  --   --  0.18   PROTIME 14.6*  --   --          Lab 04/03/24  0600 04/02/24 1918   SODIUM 142 145   POTASSIUM 5.0 4.5   CHLORIDE 107 108*   CO2 19.0* 20.0*   ANION GAP 16.0* 17.0*   BUN 67* 66*   CREATININE 8.71* 8.85*   EGFR 6.5* 6.4*   GLUCOSE 136* 78   CALCIUM 8.2* 8.6   MAGNESIUM 2.5 2.5   HEMOGLOBIN A1C  --  5.90*         Lab 04/03/24  0600 04/02/24 1918   TOTAL PROTEIN 5.4* 6.5   ALBUMIN 3.4* 3.6   GLOBULIN 2.0 2.9   ALT (SGPT) 9 9   AST (SGOT) 14 16   BILIRUBIN 0.2 0.2   ALK PHOS 89 88         Lab 04/03/24  0751   PROTIME 14.6*   INR 1.12             Lab 04/05/24  1159   IRON 46*   IRON SATURATION (TSAT) 18*   TIBC 262*   TRANSFERRIN 176*         Brief Urine Lab Results  (Last result in the past 365 days)        Color   Clarity   Blood   Leuk Est   Nitrite   Protein   CREAT   Urine HCG        08/03/23 1002             56.4               Microbiology Results (last 10 days)       ** No results found for the last 240 hours. **            IR Insert Tunneled CV Catheter Without Port 5 Plus    Result Date: 4/5/2024  IR INS TUNNELED CV CATHETER WO PORT 5 PLUS Date of Exam: 4/3/2024 2:15 PM EDT Indication: CKD stage V, Need for HD cath. Comparison: None available. Technique: The procedure, risks and options were discussed with the patient and informed written consent was obtained. The patient was placed in the supine position in the angiography suite. The right neck and anterior chest wall were  prepped and draped. Ultrasound was performed of the right neck and a site was chosen over the internal jugular vein. The skin was anesthetized with 1% lidocaine. A micropuncture was performed. Following placement of an 035 wire, serial dilatation was performed and a 16 Cameroonian introducer sheath was placed. A site was then chosen over the right anterior chest wall and the skin was anesthetized with 1% Xylocaine. The catheter was tunneled to the right IJ site and subsequently deployed with the tip in the right atrium. The catheter was flushed with heparin solution and sterile dressings applied. A single spot radiograph was obtained  documenting placement. IV conscious sedation was administered consisting of Versed and fentanyl. The patient was monitored by the IR nurse during the entire procedure. Total physician monitored sedation time was 13 minutes. Fluoroscopic Time: 42 seconds     Impression: Successful placement of a right-sided tunneled dialysis catheter with the tip in the right atrium under ultrasound and fluoroscopy. Electronically Signed: Jorgito Johnston MD  4/5/2024 7:59 AM EDT  Workstation ID: [id number]    XR Chest 1 View    Result Date: 4/2/2024  XR CHEST 1 VW Date of Exam: 4/2/2024 10:56 PM EDT Indication: Dyspnea, FVO Comparison: 8/10/2023 Findings: There is dense and extensive airspace disease bilaterally, involving much of the left mid and lower lung, and right lower lung. There is probably mild upper lung disease as well. Airspace opacity partially silhouettes the left hemidiaphragm and right heart shadow. There is minimal if any effusion. The heart shadow itself is partially obscured by the extensive airspace disease, but now appears mildly enlarged, increased from prior study. The pulmonary vasculature, where not obscured by pulmonary disease, appears cephalized. No pneumothorax is seen. Bony structures appear intact.     Impression: Interval development of extensive bilateral airspace disease.  Interval heart shadow enlargement and new pulmonary vascular congestion. Findings could all reflect volume overload with pulmonary alveolar edema, but a superimposed bilateral pneumonia should  be considered as well. Electronically Signed: Marcos Greenfield MD  4/2/2024 11:15 PM EDT  Workstation ID: [id number]                 Plan for Follow-up of Pending Labs/Results:     Discharge Details        Discharge Medications        New Medications        Instructions Start Date   carvedilol 3.125 MG tablet  Commonly known as: COREG   3.125 mg, Oral, 2 Times Daily With Meals             Changes to Medications        Instructions Start Date   hydrALAZINE 50 MG tablet  Commonly known as: APRESOLINE  What changed:   medication strength  how much to take   50 mg, Oral, 3 Times Daily             Continue These Medications        Instructions Start Date   albuterol sulfate  (90 Base) MCG/ACT inhaler  Commonly known as: PROVENTIL HFA;VENTOLIN HFA;PROAIR HFA   2 puffs, Inhalation, Every 4 Hours PRN      amLODIPine 10 MG tablet  Commonly known as: NORVASC   10 mg, Oral, Daily      atorvastatin 10 MG tablet  Commonly known as: LIPITOR   10 mg, Oral, Daily      BD Pen Needle Vidhya 2nd Gen 32G X 4 MM misc  Generic drug: Insulin Pen Needle   USE ONCE DAILY WITH VICTOZA      bumetanide 2 MG tablet  Commonly known as: BUMEX   Take 1 tablet by mouth 2 (Two) Times a Day.      calcitriol 0.25 MCG capsule  Commonly known as: ROCALTROL   1 capsule, Oral, Daily      FreeStyle Margo 2 Sensor misc       ipratropium 0.03 % nasal spray  Commonly known as: ATROVENT   2 sprays, Nasal, Every 12 Hours      Lokelma 10 g packet  Generic drug: sodium zirconium cyclosilicate   10 g, Oral      sodium bicarbonate 650 MG tablet   2 (two) tablet by mouth two times daily      vitamin D 1.25 MG (47250 UT) capsule capsule  Commonly known as: ERGOCALCIFEROL              Stop These Medications      Azelastine HCl 137 MCG/SPRAY solution     dilTIAZem  MG 24 hr  capsule  Commonly known as: Cardizem CD     guaiFENesin-codeine 100-10 MG/5ML liquid  Commonly known as: GUAIFENESIN AC     Insulin Lispro (1 Unit Dial) 100 UNIT/ML solution pen-injector  Commonly known as: HUMALOG     isosorbide mononitrate 60 MG 24 hr tablet  Commonly known as: IMDUR     Lantus SoloStar 100 UNIT/ML injection pen  Generic drug: Insulin Glargine     levocetirizine 5 MG tablet  Commonly known as: XYZAL     OneTouch Verio test strip  Generic drug: glucose blood     Semaglutide(0.25 or 0.5MG/DOS) 2 MG/3ML solution pen-injector  Commonly known as: OZEMPIC              No Known Allergies      Discharge Disposition:      Diet:  Hospital:  Diet Order   Procedures   • Diet: Cardiac, Renal; Healthy Heart (2-3 Na+); Low Phosphorus, Low Potassium; Fluid Consistency: Thin (IDDSI 0)            Activity:      Restrictions or Other Recommendations:  1.5 L fluid restriction       CODE STATUS:    Code Status and Medical Interventions:   Ordered at: 04/02/24 1855     Level Of Support Discussed With:    Patient     Code Status (Patient has no pulse and is not breathing):    CPR (Attempt to Resuscitate)     Medical Interventions (Patient has pulse or is breathing):    Full Support       Future Appointments   Date Time Provider Department Center   4/22/2024  2:30 PM  [facility]  [facility]   4/25/2024  2:30 PM  [facility] 2  [facility]   5/9/2024 11:00 AM Toñito Almazan MD [facility]   3/19/2025 11:15 AM Jose Thakkar APRN [facility]  [facility]                 Yasmine Mckinnon DO  04/05/24      Time Spent on Discharge:  I spent  35  minutes on this discharge activity which included: face-to-face encounter with the patient, reviewing the data in the system, coordination of the care with the nursing staff as well as consultants, documentation, and entering orders.

## 2024-04-05 NOTE — PLAN OF CARE
Problem: Adult Inpatient Plan of Care  Goal: Absence of Hospital-Acquired Illness or Injury  Intervention: Identify and Manage Fall Risk  Recent Flowsheet Documentation  Taken 4/5/2024 0800 by Missy Goncalves RN  Safety Promotion/Fall Prevention: safety round/check completed     Problem: Device-Related Complication Risk (Hemodialysis)  Goal: Safe, Effective Therapy Delivery  Outcome: Ongoing, Progressing     Problem: Hemodynamic Instability (Hemodialysis)  Goal: Effective Tissue Perfusion  Outcome: Ongoing, Progressing     Problem: Infection (Hemodialysis)  Goal: Absence of Infection Signs and Symptoms  Outcome: Ongoing, Progressing   Goal Outcome Evaluation:  HD today, goal UF 2.5-3 liters

## 2024-04-05 NOTE — CASE MANAGEMENT/SOCIAL WORK
Continued Stay Note  [facility]     Patient Name: Amrita Corbin See II  MRN: [id number]  Today's Date: 4/5/2024    Admit Date: 4/2/2024    Plan: Home   Discharge Plan       Row Name 04/05/24 1039       Plan    Plan Home    Patient/Family in Agreement with Plan other (see comments)    Plan Comments REceived phone call from Yary palumbo/Dawson asking  me to fax information to her @[phone]. I attempted to call Marietta salcido w/[facility], however per VM she is in meetings and training this week and advised to call [facility] admissions.    Final Discharge Disposition Code 01 - home or self-care      Row Name 04/05/24 0892       Plan    Plan HOme    Patient/Family in Agreement with Plan other (see comments)    Plan Comments I called [facility] Julianna, a coordinator has not been appointed @ this time, awaiting contact from coordinator. I will fax required information to [facility] for inpatient dialysis HD chair time, none has been assigned @ this time.    Final Discharge Disposition Code 01 - home or self-care                   Discharge Codes    No documentation.                 Expected Discharge Date and Time       Expected Discharge Date Expected Discharge Time    Apr 5, 2024               Donovan Sims RN

## 2024-04-05 NOTE — PLAN OF CARE
Goal Outcome Evaluation:      Pt had many questions regarding discharge medications when RN was going over discharge teaching with pt and wife. RN called MD and MD answered all pt and pt wife questions. Pt and Pt wife state they understand now and do not have any further questions.

## 2024-04-05 NOTE — CASE MANAGEMENT/SOCIAL WORK
Case Management Discharge Note      Final Note: Mr. Buckley is to be discharged today had 3 HD treatments while in hospital. He will be discharging w/TDC in place and scheduled to go to [facility] ([facility]) home HD program on Tuesday following appt w/. I spoke w/Rosa M @ [facility], and provided  w/an instruction sheet from them of what to bring. He will come on Tuesday have HD while being taught home HD. Once AV fistula placed, he will be retaught how to access. I met w/patient and spouse in room, they have been discussing this w/[facility] and he is to go there this week T-Friday @ noon, no other chair time has be scheduled. I added address onto AVS in patient instructions. Spouse @ bedside transporting home. No other d/c needs. I updated Dr. Mckinnon and SANTANA Clifford via chat message.         Selected Continued Care - Admitted Since 4/2/2024       Destination    No services have been selected for the patient.                Durable Medical Equipment    No services have been selected for the patient.                Dialysis/Infusion    No services have been selected for the patient.                Home Medical Care    No services have been selected for the patient.                Therapy    No services have been selected for the patient.                Community Resources    No services have been selected for the patient.                Community & DME    No services have been selected for the patient.                         Final Discharge Disposition Code: 01 - home or self-care

## 2024-04-09 ENCOUNTER — READMISSION MANAGEMENT (OUTPATIENT)
Dept: CALL CENTER | Facility: HOSPITAL | Age: 59
End: 2024-04-09
Payer: COMMERCIAL

## 2024-04-09 NOTE — OUTREACH NOTE
Medical Week 1 Survey      Flowsheet Row Responses   [facility] patient discharged from? [facility]   Does the patient have one of the following disease processes/diagnoses(primary or secondary)? Other   Week 1 attempt successful? Yes   Call start time 1528   Revoke Decline to participate   Call end time 1529   Person spoke with today (if not patient) and relationship Patient   Call end time 1529            SHIELA JOHNSON - Registered Nurse

## 2024-04-19 ENCOUNTER — HOSPITAL ENCOUNTER (OUTPATIENT)
Dept: CARDIOLOGY | Facility: HOSPITAL | Age: 59
Discharge: HOME | End: 2024-04-19
Payer: COMMERCIAL

## 2024-04-19 VITALS — WEIGHT: 310.85 LBS | HEIGHT: 71 IN | BODY MASS INDEX: 43.52 KG/M2

## 2024-04-19 DIAGNOSIS — Z01.818 PREOP EXAMINATION: ICD-10-CM

## 2024-04-19 LAB
BH CV UPPER VENOUS LEFT AXILLARY AUGMENT: NORMAL
BH CV UPPER VENOUS LEFT AXILLARY COMPRESS: NORMAL
BH CV UPPER VENOUS LEFT AXILLARY PHASIC: NORMAL
BH CV UPPER VENOUS LEFT AXILLARY SPONT: NORMAL
BH CV UPPER VENOUS LEFT BASILIC FOREARM COMPRESS: NORMAL
BH CV UPPER VENOUS LEFT BASILIC UPPER COMPRESS: NORMAL
BH CV UPPER VENOUS LEFT BRACHIAL COMPRESS: NORMAL
BH CV UPPER VENOUS LEFT CEPHALIC FOREARM COLOR: 1
BH CV UPPER VENOUS LEFT CEPHALIC FOREARM COMPRESS: NORMAL
BH CV UPPER VENOUS LEFT CEPHALIC FOREARM THROMBUS: NORMAL
BH CV UPPER VENOUS LEFT CEPHALIC UPPER COMPRESS: NORMAL
BH CV UPPER VENOUS LEFT INTERNAL JUGULAR AUGMENT: NORMAL
BH CV UPPER VENOUS LEFT INTERNAL JUGULAR COMPRESS: NORMAL
BH CV UPPER VENOUS LEFT INTERNAL JUGULAR PHASIC: NORMAL
BH CV UPPER VENOUS LEFT INTERNAL JUGULAR SPONT: NORMAL
BH CV UPPER VENOUS LEFT SUBCLAVIAN AUGMENT: NORMAL
BH CV UPPER VENOUS LEFT SUBCLAVIAN PHASIC: NORMAL
BH CV UPPER VENOUS LEFT SUBCLAVIAN SPONT: NORMAL
BH CV VAS MEAS BASILIC ANTECUBITAL FOSSA LEFT: 0.2 CM
BH CV VAS MEAS BASILIC FOREARM LEFT - MID: 0.16 CM
BH CV VAS MEAS BASILIC FOREARM LEFT - PROX: 0.16 CM
BH CV VAS MEAS BASILIC UPPER ARM LEFT - DIST: 0.34 CM
BH CV VAS MEAS BASILIC UPPER ARM LEFT - MID: 0.55 CM
BH CV VAS MEAS BASILIC UPPER ARM LEFT - PROX: 0.46 CM
BH CV VAS MEAS CEPHALIC ANTECUBITAL FOSSA LEFT: 0.3 CM
BH CV VAS MEAS CEPHALIC UPPER ARM LEFT - DIST: 0.26 CM
BH CV VAS MEAS CEPHALIC UPPER ARM LEFT - MID: 0.21 CM
BH CV VAS MEAS CEPHALIC UPPER ARM LEFT - PROX: 0.27 CM
BH CV VAS MEAS RADIAL UPPER ARM LEFT - DIST: 0.23 CM
UPPER ARTERIAL LEFT ARM BRACHIAL LENGTH: 0.55 CM

## 2024-04-19 PROCEDURE — 93986 DUP-SCAN HEMO COMPL UNI STD: CPT

## 2024-05-08 ENCOUNTER — TRANSCRIBE ORDERS (OUTPATIENT)
Dept: GENERAL RADIOLOGY | Facility: HOSPITAL | Age: 59
End: 2024-05-08
Payer: COMMERCIAL

## 2024-05-08 ENCOUNTER — HOSPITAL ENCOUNTER (OUTPATIENT)
Dept: GENERAL RADIOLOGY | Facility: HOSPITAL | Age: 59
Discharge: HOME | End: 2024-05-08
Admitting: INTERNAL MEDICINE
Payer: COMMERCIAL

## 2024-05-08 DIAGNOSIS — J40 BRONCHITIS: ICD-10-CM

## 2024-05-08 DIAGNOSIS — J40 BRONCHITIS: Primary | ICD-10-CM

## 2024-05-08 PROCEDURE — 71046 X-RAY EXAM CHEST 2 VIEWS: CPT

## 2024-05-09 ENCOUNTER — OFFICE VISIT (OUTPATIENT)
Dept: CARDIOLOGY | Facility: CLINIC | Age: 59
End: 2024-05-09
Payer: COMMERCIAL

## 2024-05-09 VITALS
WEIGHT: 293.2 LBS | HEIGHT: 71 IN | DIASTOLIC BLOOD PRESSURE: 76 MMHG | BODY MASS INDEX: 41.05 KG/M2 | OXYGEN SATURATION: 97 % | SYSTOLIC BLOOD PRESSURE: 144 MMHG | HEART RATE: 72 BPM

## 2024-05-09 DIAGNOSIS — I10 PRIMARY HYPERTENSION: Primary | ICD-10-CM

## 2024-05-09 DIAGNOSIS — N18.6 ESRD NEEDING DIALYSIS: ICD-10-CM

## 2024-05-09 DIAGNOSIS — E78.2 MIXED HYPERLIPIDEMIA: ICD-10-CM

## 2024-05-09 DIAGNOSIS — Z99.2 ESRD NEEDING DIALYSIS: ICD-10-CM

## 2024-05-09 RX ORDER — LORATADINE 10 MG/1
1 TABLET ORAL DAILY
COMMUNITY
Start: 2024-04-19

## 2024-05-09 RX ORDER — DEXTROMETHORPHAN HYDROBROMIDE AND PROMETHAZINE HYDROCHLORIDE 15; 6.25 MG/5ML; MG/5ML
SYRUP ORAL
COMMUNITY
Start: 2024-04-11

## 2024-05-09 RX ORDER — SEVELAMER CARBONATE 800 MG/1
1 TABLET, FILM COATED ORAL 3 TIMES DAILY
COMMUNITY
Start: 2024-04-16

## 2024-05-09 RX ORDER — PANTOPRAZOLE SODIUM 40 MG/1
1 TABLET, DELAYED RELEASE ORAL
COMMUNITY
Start: 2024-05-07

## 2024-05-09 RX ORDER — MONTELUKAST SODIUM 10 MG/1
1 TABLET ORAL DAILY
COMMUNITY
Start: 2024-04-16

## 2024-05-09 RX ORDER — CARVEDILOL 3.12 MG/1
3.12 TABLET ORAL 2 TIMES DAILY WITH MEALS
Qty: 60 TABLET | Refills: 0 | Status: SHIPPED | OUTPATIENT
Start: 2024-05-09 | End: 2024-06-08

## 2024-05-09 RX ORDER — INSULIN LISPRO 100 [IU]/ML
INJECTION, SOLUTION INTRAVENOUS; SUBCUTANEOUS
COMMUNITY
Start: 2024-04-16

## 2024-05-09 RX ORDER — LEVOCETIRIZINE DIHYDROCHLORIDE 5 MG/1
1 TABLET, FILM COATED ORAL DAILY
COMMUNITY
Start: 2024-04-16

## 2024-05-09 RX ORDER — AZELASTINE HYDROCHLORIDE 137 UG/1
SPRAY, METERED NASAL
COMMUNITY
Start: 2024-04-16

## 2024-05-15 ENCOUNTER — PRE-ADMISSION TESTING (OUTPATIENT)
Dept: PREADMISSION TESTING | Facility: HOSPITAL | Age: 59
End: 2024-05-15
Payer: COMMERCIAL

## 2024-05-15 VITALS — HEIGHT: 71 IN | BODY MASS INDEX: 40.35 KG/M2 | WEIGHT: 288.25 LBS

## 2024-05-15 LAB
ALBUMIN SERPL-MCNC: 3.7 G/DL (ref 3.5–5.2)
ALBUMIN/GLOB SERPL: 1.2 G/DL
ALP SERPL-CCNC: 78 U/L (ref 39–117)
ALT SERPL W P-5'-P-CCNC: 10 U/L (ref 1–41)
ANION GAP SERPL CALCULATED.3IONS-SCNC: 8 MMOL/L (ref 5–15)
AST SERPL-CCNC: 15 U/L (ref 1–40)
BILIRUB SERPL-MCNC: 0.3 MG/DL (ref 0–1.2)
BUN SERPL-MCNC: 29 MG/DL (ref 6–20)
BUN/CREAT SERPL: 7.8 (ref 7–25)
CALCIUM SPEC-SCNC: 8.8 MG/DL (ref 8.6–10.5)
CHLORIDE SERPL-SCNC: 96 MMOL/L (ref 98–107)
CO2 SERPL-SCNC: 31 MMOL/L (ref 22–29)
CREAT SERPL-MCNC: 3.73 MG/DL (ref 0.76–1.27)
DEPRECATED RDW RBC AUTO: 48 FL (ref 37–54)
EGFRCR SERPLBLD CKD-EPI 2021: 18.1 ML/MIN/1.73
ERYTHROCYTE [DISTWIDTH] IN BLOOD BY AUTOMATED COUNT: 14.2 % (ref 12.3–15.4)
GLOBULIN UR ELPH-MCNC: 3.1 GM/DL
GLUCOSE SERPL-MCNC: 192 MG/DL (ref 65–99)
HBA1C MFR BLD: 6.5 % (ref 4.8–5.6)
HCT VFR BLD AUTO: 33 % (ref 37.5–51)
HGB BLD-MCNC: 10.4 G/DL (ref 13–17.7)
INR PPP: 1.01 (ref 0.89–1.12)
MCH RBC QN AUTO: 29.5 PG (ref 26.6–33)
MCHC RBC AUTO-ENTMCNC: 31.5 G/DL (ref 31.5–35.7)
MCV RBC AUTO: 93.5 FL (ref 79–97)
PLATELET # BLD AUTO: 273 10*3/MM3 (ref 140–450)
PMV BLD AUTO: 9.8 FL (ref 6–12)
POTASSIUM SERPL-SCNC: 4.5 MMOL/L (ref 3.5–5.2)
PROT SERPL-MCNC: 6.8 G/DL (ref 6–8.5)
PROTHROMBIN TIME: 13.4 SECONDS (ref 12.2–14.5)
RBC # BLD AUTO: 3.53 10*6/MM3 (ref 4.14–5.8)
SODIUM SERPL-SCNC: 135 MMOL/L (ref 136–145)
WBC NRBC COR # BLD AUTO: 7.48 10*3/MM3 (ref 3.4–10.8)

## 2024-05-15 PROCEDURE — 93010 ELECTROCARDIOGRAM REPORT: CPT | Performed by: INTERNAL MEDICINE

## 2024-05-15 PROCEDURE — 85027 COMPLETE CBC AUTOMATED: CPT

## 2024-05-15 PROCEDURE — 93005 ELECTROCARDIOGRAM TRACING: CPT

## 2024-05-15 PROCEDURE — 36415 COLL VENOUS BLD VENIPUNCTURE: CPT

## 2024-05-15 PROCEDURE — 83036 HEMOGLOBIN GLYCOSYLATED A1C: CPT

## 2024-05-15 PROCEDURE — 80053 COMPREHEN METABOLIC PANEL: CPT

## 2024-05-15 PROCEDURE — 85610 PROTHROMBIN TIME: CPT

## 2024-05-15 RX ORDER — AMLODIPINE BESYLATE 10 MG/1
10 TABLET ORAL DAILY
COMMUNITY
Start: 2024-03-26

## 2024-05-15 RX ORDER — DILTIAZEM HYDROCHLORIDE 240 MG/1
240 CAPSULE, COATED, EXTENDED RELEASE ORAL DAILY
Status: ON HOLD | COMMUNITY
End: 2024-05-23

## 2024-05-15 RX ORDER — INSULIN GLARGINE 100 [IU]/ML
18 INJECTION, SOLUTION SUBCUTANEOUS DAILY
COMMUNITY
Start: 2024-05-10

## 2024-05-15 NOTE — PAT
An arrival time for procedure was not provided during PAT visit. If patient had any questions or concerns about their arrival time, they were instructed to contact their surgeon/physician.  Additionally, if the patient referred to an arrival time that was acquired from their my chart account, patient was encouraged to verify that time with their surgeon/physician. Arrival times are NOT provided in Pre Admission Testing Department.    Patient to apply Chlorhexadine wipes  to surgical area (as instructed) the night before procedure and the AM of procedure. Wipes provided.    One-on-one Pre Admission Testing general education provided during PAT visit.  Copies of PAT general education handouts (Incentive Spirometry, Meds to Beds Program, Patient Belongings, Pre-op skin preparation instructions, Blood Glucose testing, Visitor policy, Surgery FAQ, Code H) distributed to patient. Encouraged patient/family to read PAT general education handouts thoroughly and notify PAT staff with any questions or concerns. Patient instructed to bring PAT pass and completed skin prep sheet (if applicable) on the day of procedure. Patient verbalized understanding of all information and priority content.

## 2024-05-16 LAB
QT INTERVAL: 426 MS
QTC INTERVAL: 479 MS

## 2024-05-23 ENCOUNTER — ANESTHESIA EVENT (OUTPATIENT)
Dept: PERIOP | Facility: HOSPITAL | Age: 59
End: 2024-05-23
Payer: COMMERCIAL

## 2024-05-23 ENCOUNTER — HOSPITAL ENCOUNTER (OUTPATIENT)
Facility: HOSPITAL | Age: 59
Setting detail: HOSPITAL OUTPATIENT SURGERY
Discharge: HOME | End: 2024-05-23
Attending: SURGERY | Admitting: SURGERY
Payer: COMMERCIAL

## 2024-05-23 ENCOUNTER — ANESTHESIA EVENT CONVERTED (OUTPATIENT)
Dept: ANESTHESIOLOGY | Facility: HOSPITAL | Age: 59
End: 2024-05-23
Payer: COMMERCIAL

## 2024-05-23 ENCOUNTER — ANESTHESIA (OUTPATIENT)
Dept: PERIOP | Facility: HOSPITAL | Age: 59
End: 2024-05-23
Payer: COMMERCIAL

## 2024-05-23 VITALS
TEMPERATURE: 97 F | RESPIRATION RATE: 14 BRPM | SYSTOLIC BLOOD PRESSURE: 186 MMHG | BODY MASS INDEX: 40.32 KG/M2 | OXYGEN SATURATION: 93 % | DIASTOLIC BLOOD PRESSURE: 92 MMHG | HEART RATE: 71 BPM | HEIGHT: 71 IN | WEIGHT: 288 LBS

## 2024-05-23 DIAGNOSIS — N18.6 ESRD NEEDING DIALYSIS: Primary | ICD-10-CM

## 2024-05-23 DIAGNOSIS — Z99.2 ESRD NEEDING DIALYSIS: Primary | ICD-10-CM

## 2024-05-23 LAB
GLUCOSE BLDC GLUCOMTR-MCNC: 198 MG/DL (ref 70–130)
GLUCOSE BLDC GLUCOMTR-MCNC: 297 MG/DL (ref 70–130)
POTASSIUM SERPL-SCNC: 4.7 MMOL/L (ref 3.5–5.2)

## 2024-05-23 PROCEDURE — 25010000002 VANCOMYCIN HCL IN NACL 2-0.9 GM/500ML-% SOLUTION: Performed by: SURGERY

## 2024-05-23 PROCEDURE — 25010000002 DEXAMETHASONE PER 1 MG: Performed by: NURSE ANESTHETIST, CERTIFIED REGISTERED

## 2024-05-23 PROCEDURE — C1768 GRAFT, VASCULAR: HCPCS | Performed by: SURGERY

## 2024-05-23 PROCEDURE — 25010000002 PROPOFOL 10 MG/ML EMULSION: Performed by: NURSE ANESTHETIST, CERTIFIED REGISTERED

## 2024-05-23 PROCEDURE — 25010000002 HEPARIN (PORCINE) PER 1000 UNITS: Performed by: SURGERY

## 2024-05-23 PROCEDURE — 84132 ASSAY OF SERUM POTASSIUM: CPT | Performed by: ANESTHESIOLOGY

## 2024-05-23 PROCEDURE — 25010000002 ROPIVACAINE PER 1 MG: Performed by: NURSE ANESTHETIST, CERTIFIED REGISTERED

## 2024-05-23 PROCEDURE — 25810000003 SODIUM CHLORIDE 0.9 % SOLUTION: Performed by: ANESTHESIOLOGY

## 2024-05-23 PROCEDURE — 25010000002 ONDANSETRON PER 1 MG: Performed by: NURSE ANESTHETIST, CERTIFIED REGISTERED

## 2024-05-23 PROCEDURE — 82948 REAGENT STRIP/BLOOD GLUCOSE: CPT

## 2024-05-23 PROCEDURE — 25010000002 FENTANYL CITRATE (PF) 50 MCG/ML SOLUTION: Performed by: NURSE ANESTHETIST, CERTIFIED REGISTERED

## 2024-05-23 PROCEDURE — 63710000001 INSULIN REGULAR HUMAN PER 5 UNITS: Performed by: ANESTHESIOLOGY

## 2024-05-23 DEVICE — LIGACLIP MCA MULTIPLE CLIP APPLIERS, 20 SMALL CLIPS
Type: IMPLANTABLE DEVICE | Site: ARM | Status: FUNCTIONAL
Brand: LIGACLIP

## 2024-05-23 DEVICE — GRFT VASC COLLGN 5MM 35CM: Type: IMPLANTABLE DEVICE | Site: ARM | Status: FUNCTIONAL

## 2024-05-23 RX ORDER — LIDOCAINE HYDROCHLORIDE 10 MG/ML
INJECTION, SOLUTION EPIDURAL; INFILTRATION; INTRACAUDAL; PERINEURAL AS NEEDED
Status: DISCONTINUED | OUTPATIENT
Start: 2024-05-23 | End: 2024-05-23 | Stop reason: SURG

## 2024-05-23 RX ORDER — PROMETHAZINE HYDROCHLORIDE 25 MG/1
25 SUPPOSITORY RECTAL ONCE AS NEEDED
Status: DISCONTINUED | OUTPATIENT
Start: 2024-05-23 | End: 2024-05-23 | Stop reason: HOSPADM

## 2024-05-23 RX ORDER — SODIUM CHLORIDE 9 MG/ML
40 INJECTION, SOLUTION INTRAVENOUS AS NEEDED
Status: DISCONTINUED | OUTPATIENT
Start: 2024-05-23 | End: 2024-05-23 | Stop reason: HOSPADM

## 2024-05-23 RX ORDER — ISOSORBIDE MONONITRATE 60 MG/1
60 TABLET, EXTENDED RELEASE ORAL DAILY
COMMUNITY

## 2024-05-23 RX ORDER — SODIUM CHLORIDE 0.9 % (FLUSH) 0.9 %
10 SYRINGE (ML) INJECTION AS NEEDED
Status: DISCONTINUED | OUTPATIENT
Start: 2024-05-23 | End: 2024-05-23

## 2024-05-23 RX ORDER — DROPERIDOL 2.5 MG/ML
0.62 INJECTION, SOLUTION INTRAMUSCULAR; INTRAVENOUS
Status: DISCONTINUED | OUTPATIENT
Start: 2024-05-23 | End: 2024-05-23 | Stop reason: HOSPADM

## 2024-05-23 RX ORDER — SODIUM CHLORIDE 0.9 % (FLUSH) 0.9 %
10 SYRINGE (ML) INJECTION EVERY 12 HOURS SCHEDULED
Status: DISCONTINUED | OUTPATIENT
Start: 2024-05-23 | End: 2024-05-23 | Stop reason: HOSPADM

## 2024-05-23 RX ORDER — SODIUM CHLORIDE 9 MG/ML
9 INJECTION, SOLUTION INTRAVENOUS CONTINUOUS PRN
Status: DISCONTINUED | OUTPATIENT
Start: 2024-05-23 | End: 2024-05-23 | Stop reason: HOSPADM

## 2024-05-23 RX ORDER — SODIUM CHLORIDE 0.9 % (FLUSH) 0.9 %
3 SYRINGE (ML) INJECTION EVERY 12 HOURS SCHEDULED
Status: DISCONTINUED | OUTPATIENT
Start: 2024-05-23 | End: 2024-05-23 | Stop reason: HOSPADM

## 2024-05-23 RX ORDER — FENTANYL CITRATE 50 UG/ML
50 INJECTION, SOLUTION INTRAMUSCULAR; INTRAVENOUS
Status: DISCONTINUED | OUTPATIENT
Start: 2024-05-23 | End: 2024-05-23 | Stop reason: HOSPADM

## 2024-05-23 RX ORDER — SODIUM CHLORIDE, SODIUM LACTATE, POTASSIUM CHLORIDE, CALCIUM CHLORIDE 600; 310; 30; 20 MG/100ML; MG/100ML; MG/100ML; MG/100ML
9 INJECTION, SOLUTION INTRAVENOUS CONTINUOUS
Status: DISCONTINUED | OUTPATIENT
Start: 2024-05-23 | End: 2024-05-23

## 2024-05-23 RX ORDER — PROMETHAZINE HYDROCHLORIDE 25 MG/1
25 TABLET ORAL ONCE AS NEEDED
Status: DISCONTINUED | OUTPATIENT
Start: 2024-05-23 | End: 2024-05-23 | Stop reason: HOSPADM

## 2024-05-23 RX ORDER — ONDANSETRON 2 MG/ML
INJECTION INTRAMUSCULAR; INTRAVENOUS AS NEEDED
Status: DISCONTINUED | OUTPATIENT
Start: 2024-05-23 | End: 2024-05-23 | Stop reason: SURG

## 2024-05-23 RX ORDER — ONDANSETRON 2 MG/ML
4 INJECTION INTRAMUSCULAR; INTRAVENOUS ONCE AS NEEDED
Status: DISCONTINUED | OUTPATIENT
Start: 2024-05-23 | End: 2024-05-23 | Stop reason: HOSPADM

## 2024-05-23 RX ORDER — LIDOCAINE HYDROCHLORIDE 10 MG/ML
0.5 INJECTION, SOLUTION EPIDURAL; INFILTRATION; INTRACAUDAL; PERINEURAL ONCE AS NEEDED
Status: COMPLETED | OUTPATIENT
Start: 2024-05-23 | End: 2024-05-23

## 2024-05-23 RX ORDER — MIDAZOLAM HYDROCHLORIDE 1 MG/ML
1 INJECTION INTRAMUSCULAR; INTRAVENOUS
Status: DISCONTINUED | OUTPATIENT
Start: 2024-05-23 | End: 2024-05-23 | Stop reason: HOSPADM

## 2024-05-23 RX ORDER — FENTANYL CITRATE 50 UG/ML
INJECTION, SOLUTION INTRAMUSCULAR; INTRAVENOUS AS NEEDED
Status: DISCONTINUED | OUTPATIENT
Start: 2024-05-23 | End: 2024-05-23 | Stop reason: SURG

## 2024-05-23 RX ORDER — DEXAMETHASONE SODIUM PHOSPHATE 4 MG/ML
INJECTION, SOLUTION INTRA-ARTICULAR; INTRALESIONAL; INTRAMUSCULAR; INTRAVENOUS; SOFT TISSUE AS NEEDED
Status: DISCONTINUED | OUTPATIENT
Start: 2024-05-23 | End: 2024-05-23 | Stop reason: SURG

## 2024-05-23 RX ORDER — HYDROMORPHONE HYDROCHLORIDE 1 MG/ML
0.5 INJECTION, SOLUTION INTRAMUSCULAR; INTRAVENOUS; SUBCUTANEOUS
Status: DISCONTINUED | OUTPATIENT
Start: 2024-05-23 | End: 2024-05-23 | Stop reason: HOSPADM

## 2024-05-23 RX ORDER — HYDROCODONE BITARTRATE AND ACETAMINOPHEN 5; 325 MG/1; MG/1
1 TABLET ORAL ONCE AS NEEDED
Status: DISCONTINUED | OUTPATIENT
Start: 2024-05-23 | End: 2024-05-23 | Stop reason: HOSPADM

## 2024-05-23 RX ORDER — FAMOTIDINE 20 MG/1
20 TABLET, FILM COATED ORAL
Status: COMPLETED | OUTPATIENT
Start: 2024-05-23 | End: 2024-05-23

## 2024-05-23 RX ORDER — SODIUM CHLORIDE 9 MG/ML
40 INJECTION, SOLUTION INTRAVENOUS AS NEEDED
Status: DISCONTINUED | OUTPATIENT
Start: 2024-05-23 | End: 2024-05-23

## 2024-05-23 RX ORDER — HYDROCODONE BITARTRATE AND ACETAMINOPHEN 5; 325 MG/1; MG/1
1 TABLET ORAL EVERY 8 HOURS PRN
Qty: 10 TABLET | Refills: 0 | Status: SHIPPED | OUTPATIENT
Start: 2024-05-23

## 2024-05-23 RX ORDER — SODIUM CHLORIDE 0.9 % (FLUSH) 0.9 %
3-10 SYRINGE (ML) INJECTION AS NEEDED
Status: DISCONTINUED | OUTPATIENT
Start: 2024-05-23 | End: 2024-05-23 | Stop reason: HOSPADM

## 2024-05-23 RX ORDER — SODIUM CHLORIDE 0.9 % (FLUSH) 0.9 %
10 SYRINGE (ML) INJECTION AS NEEDED
Status: DISCONTINUED | OUTPATIENT
Start: 2024-05-23 | End: 2024-05-23 | Stop reason: HOSPADM

## 2024-05-23 RX ORDER — LIDOCAINE HYDROCHLORIDE 10 MG/ML
0.5 INJECTION, SOLUTION EPIDURAL; INFILTRATION; INTRACAUDAL; PERINEURAL ONCE AS NEEDED
Status: DISCONTINUED | OUTPATIENT
Start: 2024-05-23 | End: 2024-05-23

## 2024-05-23 RX ORDER — VANCOMYCIN 2 GRAM/500 ML IN 0.9 % SODIUM CHLORIDE INTRAVENOUS
15 ONCE
Qty: 500 ML | Refills: 0 | Status: COMPLETED | OUTPATIENT
Start: 2024-05-23 | End: 2024-05-23

## 2024-05-23 RX ORDER — NALOXONE HCL 0.4 MG/ML
0.4 VIAL (ML) INJECTION AS NEEDED
Status: DISCONTINUED | OUTPATIENT
Start: 2024-05-23 | End: 2024-05-23 | Stop reason: HOSPADM

## 2024-05-23 RX ORDER — HYDRALAZINE HYDROCHLORIDE 20 MG/ML
5 INJECTION INTRAMUSCULAR; INTRAVENOUS
Status: DISCONTINUED | OUTPATIENT
Start: 2024-05-23 | End: 2024-05-23 | Stop reason: HOSPADM

## 2024-05-23 RX ORDER — DOCUSATE SODIUM 250 MG
250 CAPSULE ORAL DAILY
Qty: 10 CAPSULE | Refills: 0 | Status: SHIPPED | OUTPATIENT
Start: 2024-05-23

## 2024-05-23 RX ORDER — PROPOFOL 10 MG/ML
VIAL (ML) INTRAVENOUS AS NEEDED
Status: DISCONTINUED | OUTPATIENT
Start: 2024-05-23 | End: 2024-05-23 | Stop reason: SURG

## 2024-05-23 RX ORDER — IPRATROPIUM BROMIDE AND ALBUTEROL SULFATE 2.5; .5 MG/3ML; MG/3ML
3 SOLUTION RESPIRATORY (INHALATION) ONCE AS NEEDED
Status: DISCONTINUED | OUTPATIENT
Start: 2024-05-23 | End: 2024-05-23 | Stop reason: HOSPADM

## 2024-05-23 RX ORDER — FENTANYL CITRATE 50 UG/ML
INJECTION, SOLUTION INTRAMUSCULAR; INTRAVENOUS
Status: COMPLETED | OUTPATIENT
Start: 2024-05-23 | End: 2024-05-23

## 2024-05-23 RX ORDER — LABETALOL HYDROCHLORIDE 5 MG/ML
5 INJECTION, SOLUTION INTRAVENOUS
Status: DISCONTINUED | OUTPATIENT
Start: 2024-05-23 | End: 2024-05-23 | Stop reason: HOSPADM

## 2024-05-23 RX ORDER — SODIUM CHLORIDE 0.9 % (FLUSH) 0.9 %
10 SYRINGE (ML) INJECTION EVERY 12 HOURS SCHEDULED
Status: DISCONTINUED | OUTPATIENT
Start: 2024-05-23 | End: 2024-05-23

## 2024-05-23 RX ORDER — ROPIVACAINE HYDROCHLORIDE 5 MG/ML
INJECTION, SOLUTION EPIDURAL; INFILTRATION; PERINEURAL
Status: COMPLETED | OUTPATIENT
Start: 2024-05-23 | End: 2024-05-23

## 2024-05-23 RX ADMIN — PROPOFOL 200 MG: 10 INJECTION, EMULSION INTRAVENOUS at 13:23

## 2024-05-23 RX ADMIN — FENTANYL CITRATE 100 MCG: 50 INJECTION, SOLUTION INTRAMUSCULAR; INTRAVENOUS at 12:50

## 2024-05-23 RX ADMIN — FENTANYL CITRATE 100 MCG: 50 INJECTION, SOLUTION INTRAMUSCULAR; INTRAVENOUS at 13:23

## 2024-05-23 RX ADMIN — LIDOCAINE HYDROCHLORIDE 0.5 ML: 10 INJECTION, SOLUTION EPIDURAL; INFILTRATION; INTRACAUDAL; PERINEURAL at 11:58

## 2024-05-23 RX ADMIN — ONDANSETRON 4 MG: 2 INJECTION INTRAMUSCULAR; INTRAVENOUS at 13:33

## 2024-05-23 RX ADMIN — ROPIVACAINE HYDROCHLORIDE 30 ML: 5 INJECTION, SOLUTION EPIDURAL; INFILTRATION; PERINEURAL at 12:50

## 2024-05-23 RX ADMIN — FAMOTIDINE 20 MG: 20 TABLET, FILM COATED ORAL at 12:21

## 2024-05-23 RX ADMIN — LIDOCAINE HYDROCHLORIDE 100 MG: 10 INJECTION, SOLUTION EPIDURAL; INFILTRATION; INTRACAUDAL; PERINEURAL at 13:23

## 2024-05-23 RX ADMIN — Medication 2000 MG: at 11:59

## 2024-05-23 RX ADMIN — HUMAN INSULIN 5 UNITS: 100 INJECTION, SOLUTION SUBCUTANEOUS at 12:26

## 2024-05-23 RX ADMIN — DEXAMETHASONE SODIUM PHOSPHATE 4 MG: 4 INJECTION INTRA-ARTICULAR; INTRALESIONAL; INTRAMUSCULAR; INTRAVENOUS; SOFT TISSUE at 13:33

## 2024-05-23 RX ADMIN — SODIUM CHLORIDE 9 ML/HR: 9 INJECTION, SOLUTION INTRAVENOUS at 11:58

## 2024-05-23 NOTE — OP NOTE
General Surgery Operative Note    Amrita Buckley II  [id number]  1965    Date of Surgery:  5/23/2024 16:19 EDT    Pre-op Diagnosis: Chronic renal failure    Post-op Diagnosis: Chronic renal failure    Procedure: Arteriovenous fistula creation, left brachioaxillary with 5 mm Artegraft    Surgeon: Gume Allen MD    Anesthesia: General anesthetic with left regional block    Fluids: 150 mL crystalloid    Estimated Blood Loss: Less than 25 mL    Urine Voided: Not recorded     Drains: None    Implant: 5 mm Artegraft    Findings: The brachial artery was adequate.                   The axillary outflow vein was adequate.                   There was a noted thrill in the outflow vein.    Complications: None apparent.    History:   58-year-old gentleman with chronic renal failure currently on hemodialysis via a tunneled catheter requires creation of an arteriovenous fistula for long-term hemodialysis access.      I had a discussion regarding the risks and benefits of arteriovenous fistula creation, including but not limited to: bleeding, infection, injury to adjacent viscera (vessels and nerves), permanent neurovascular or neuromuscular deficits, a steal syndrome, non-maturation, chronic pain, left upper extremity swelling, need for re-intervention, distal arterial embolization, and medical issues from a cardiopulmonary and deep venous thrombosis standpoint.  All their questions were answered and they wish to proceed.     Procedure:      After informed consent the patient was taken to the operating room and placed in the supine position on the operating table.  Adequate cardiopulmonary monitoring was placed by anesthesia and general anesthesia was induced.  The patient had a preoperative left upper extremity block.  The left upper extremity was prepped and draped in the standard sterile fashion, an ioban was placed on the skin, and a time out was observed.     An incision was created over brachial artery just  proximal to the antecubital crease in a longitudinal fashion. I dissected down through the subcutaneous elements to the sheath surrounding the brachial artery.  The brachial artery was dissected free from the surrounding tissue and encircled with a vessel loop.  This was of adequate size with minimal calcification for arteriovenous fistula creation.  I then created a longitudinal incision on the upper medial arm directly over the axillary vein.  I dissected down through the subcutaneous elements to the sheath surrounding the axillary vein.  This was opened sharply and the vein was of adequate size for fistula creation.  I then used the semicircular Letona tunneler and created at the subcutaneous tunnel in the lateral upper arm in standard fashion from the arterial to venous site.  A 5 mm Artegraft was prepped and marked in the standard fashion.  This was brought through the Letona tunneler maintaining orientation.  I began with the venous anastomosis.  The vein was clamped proximally and distally, the venotomy was created with an 11 blade scalpel, and the venotomy was extended to match the aperture of the outflow 5 mm Artegraft.  The Artegraft was cut to an appropriate length.  In an end-to-side fashion with a running 6-0 Prolene the venous anastomosis was tied down.  The venous anastomosis was flushed with heparinized saline.  All clamps were removed and the venous anastomosis was tied down under full venous pressure.  I then flushed the system with heparinized saline and placed a small clamp across the distal Artegraft.  I then turned to the arterial site.  In a very similar fashion the artery was clamped proximally and distally, the arteriotomy was created with an 11 blade scalpel, and the arteriotomy was extended with Lewis scissors to match the aperture of the 5 mm Artegraft outflow conduit.  The Artegraft was cut to an appropriate length.  In an end-to-side fashion with a running 6-0 Prolene the arterial  anastomosis was performed.  The anastomosis was appropriately de-aired and flushed.  All clamps were removed and the arterial anastomosis was tied down under full systemic arterial pressure.  Meticulous hemostasis was obtained.  5 mL of Floseal was  between the 2 anastomosis.  This was ultimately irrigated free with Irrisept irrigation.  Meticulous hemostasis was noted.  Each incision was then closed with 2 layers of interrupted 3-0 Vicryl.  The skin was run with a 4-0 subcuticular Monocryl.  Skin glue was used to dress the incisions.  These were covered with a cover Derm.  There was a good thrill in the graft post procedurally.     Sterile dressings were placed on the wound.  All lap and needle counts were reported as correct at the end of the procedure ×2.  The patient was then transferred to the recovery room in stable condition.     Gume Allen MD     Date: 5/23/2024  Time: 16:19 EDT

## 2024-05-23 NOTE — ANESTHESIA PROCEDURE NOTES
Airway  Urgency: elective    Date/Time: 5/23/2024 1:27 PM  Airway not difficult    General Information and Staff    Patient location during procedure: OR  CRNA/CAA: Prema Field CRNA    Indications and Patient Condition  Indications for airway management: airway protection    Preoxygenated: yes  Mask difficulty assessment: 0 - not attempted    Final Airway Details  Final airway type: supraglottic airway      Successful airway: I-gel  Size 5     Number of attempts at approach: 1  Assessment: lips, teeth, and gum same as pre-op    Additional Comments  LMA placed without difficulty, ventilation with assist, equal breath sounds and symmetric chest rise and fall

## 2024-05-23 NOTE — ANESTHESIA PREPROCEDURE EVALUATION
Anesthesia Evaluation     Patient summary reviewed and Nursing notes reviewed   NPO Solid Status: > 8 hours  NPO Liquid Status: > 2 hours           Airway   Mallampati: III  TM distance: >3 FB  Neck ROM: full  Possible difficult intubation  Dental      Pulmonary    (+) asthma (bronchitis after Covid still takes MDI),shortness of breath, sleep apnea on CPAP  (-) recent URI, not a smoker, no home oxygen  Cardiovascular     ECG reviewed    (+) hypertension, CHF , hyperlipidemia  (-) past MI, dysrhythmias, angina, cardiac stents    ROS comment: ECG NSR LAE     [id number]  EF>58%. LVDD indeterminate.LVEDP   is elevated.   RV mildly dilated fxn normal. RVSP appears normal mild elevation    MPS 2023  EF = 68%. no evidence of ischemia.  Diaphragmatic attenuation artifact -normal ECG stress test.         Neuro/Psych  (-) seizures, CVA  GI/Hepatic/Renal/Endo    (+) obesity, morbid obesity, renal disease (creat >3)- CRI, ESRD and dialysis, diabetes mellitus (A1C 6.5) type 2 well controlled    Musculoskeletal     Abdominal    Substance History      OB/GYN          Other        ROS/Med Hx Other: HCT 33                    Anesthesia Plan    ASA 3     general with block     (SC block )  intravenous induction     Anesthetic plan, risks, benefits, and alternatives have been provided, discussed and informed consent has been obtained with: patient.        CODE STATUS:

## 2024-05-23 NOTE — ANESTHESIA POSTPROCEDURE EVALUATION
Patient: Amrita Corbin See II    Procedure Summary       Date: 05/23/24 Room / Location:  [facility] OR 05 /  [facility]    Anesthesia Start: 1315 Anesthesia Stop: 1628    Procedure: LEFT UPPER ARTERIOVENOUS FISTULA FORMATION WITH ARTEGRAFT, BRACHIOAXILLARY (Left) Diagnosis:     Surgeons: Gume Allen MD Provider: Yony Boothe MD    Anesthesia Type: general with block ASA Status: 3            Anesthesia Type: general with block    Vitals  Vitals Value Taken Time   /89 05/23/24 1730   Temp 97 °F (36.1 °C) 05/23/24 1625   Pulse 68 05/23/24 1730   Resp 14 05/23/24 1730   SpO2 91 % 05/23/24 1730           Post Anesthesia Care and Evaluation    Patient location during evaluation: PACU  Patient participation: complete - patient participated  Level of consciousness: awake and alert  Pain management: adequate    Airway patency: patent  Anesthetic complications: No anesthetic complications  PONV Status: none  Cardiovascular status: hemodynamically stable and acceptable  Respiratory status: nonlabored ventilation, acceptable and nasal cannula  Hydration status: acceptable

## 2024-05-23 NOTE — INTERVAL H&P NOTE
"[facility] Pre-op    Full history and physical note from office is attached.    VS: /76  HR 75  RR 16  T 97.8 Sat 95%RA    Lungs: Clear to auscultation bilaterally, respirations unlabored  Heart:   Regular rate and rhythm, S1 and S2 normal    LAB Results:  Lab Results   Component Value Date    WBC 7.48 05/15/2024    HGB 10.4 (L) 05/15/2024    HCT 33.0 (L) 05/15/2024    MCV 93.5 05/15/2024     05/15/2024    NEUTROABS 6.06 04/03/2024    GLUCOSE 192 (H) 05/15/2024    BUN 29 (H) 05/15/2024    CREATININE 3.73 (H) 05/15/2024    EGFRIFNONA 45 (L) 11/17/2021    EGFRIFAFRI 65 08/13/2015     (L) 05/15/2024    K 4.5 05/15/2024    CL 96 (L) 05/15/2024    CO2 31.0 (H) 05/15/2024    MG 2.5 04/03/2024    CALCIUM 8.8 05/15/2024    ALBUMIN 3.7 05/15/2024    AST 15 05/15/2024    ALT 10 05/15/2024    BILITOT 0.3 05/15/2024    PTT 28 05/28/2023    INR 1.01 05/15/2024       Cancer Staging (if applicable)  Cancer Patient: __ yes __no __unknown__N/A; If yes, clinical stage T:__ N:__M:__, stage group or __N/A      Impression: Chronic kidney disease, Stage V      Plan: LEFT UPPER ARTERIOVENOUS FISTULA FORMATION WITH ARTEGRAFT, BRACHIOAXILLARY       YUSRA Mccullough   5/23/2024   12:05 EDT    I have reviewed the above note, my prior note(s), appropriate imaging, and labs.  I have again discussed the risks and benefits of left upper extremity AV fistula creation with artegraft, brachioaxillary, with the patient.  All of their questions have been answered.  They understand and wish to proceed with surgical intervention.    CMP  Results from last 7 days   Lab Units 05/23/24  1210   POTASSIUM mmol/L 4.7       Coagulation Cascade  PTT   Date Value Ref Range Status   05/28/2023 28 25 - 35 sec Final     INR   Date Value Ref Range Status   05/15/2024 1.01 0.89 - 1.12 Final     Protime   Date Value Ref Range Status   05/15/2024 13.4 12.2 - 14.5 Seconds Final       No components found for: \"ECHO\"            "

## 2024-05-23 NOTE — DISCHARGE INSTRUCTIONS
You may not drive within 24 hour of receiving narcotic pain medication.   You may return to work/school in 2 weeks. Follow restrictions.   Restrictions of no excessive twisting/bending/lifting greater than 15 lbs for 6 weeks.   Sponge bathe only while the tunneled hemodialysis catheter is in place..   No tub baths, do not submerge the incision underwater in a tub bath etc.    Remove the cover Derm dressing after 24 hours.

## 2024-05-23 NOTE — ANESTHESIA PROCEDURE NOTES
Peripheral Block    Pre-sedation assessment completed: 5/23/2024 12:40 PM    Patient reassessed immediately prior to procedure    Patient location during procedure: pre-op  Start time: 5/23/2024 12:40 PM  Stop time: 5/23/2024 12:50 PM  Reason for block: at surgeon's request and post-op pain management  Performed by  CRNA/CAA: Yonatan Potts, CRNA  Assisted by: Clarice Oconnor RN  Preanesthetic Checklist  Completed: patient identified, IV checked, site marked, risks and benefits discussed, surgical consent, monitors and equipment checked, pre-op evaluation and timeout performed  Prep:  Sterile barriers:cap, gloves, mask and washed/disinfected hands  Prep: ChloraPrep  Patient monitoring: blood pressure monitoring, continuous pulse oximetry and EKG  Procedure    Sedation: yes  Performed under: local infiltration  Guidance:ultrasound guided    ULTRASOUND INTERPRETATION.  Using ultrasound guidance a 20 G gauge needle was placed in close proximity to the brachial plexus nerve, at which point, under ultrasound guidance anesthetic was injected in the area of the nerve and spread of the anesthesia was seen on ultrasound in close proximity thereto.  There were no abnormalities seen on ultrasound; a digital image was taken; and the patient tolerated the procedure with no complications. Images:still images obtained, printed/placed on chart    Laterality:left  Block Type:supraclavicular  Injection Technique:single-shot  Needle Type:echogenic and short-bevel  Needle Gauge:20 G  Resistance on Injection: none    Medications Used: fentaNYL citrate (PF) (SUBLIMAZE) injection - Intravenous   100 mcg - 5/23/2024 12:50:00 PM  ropivacaine (NAROPIN) 0.5 % injection - Injection   30 mL - 5/23/2024 12:50:00 PM      Medications  Preservative Free Saline:10ml    Post Assessment  Injection Assessment: negative aspiration for heme, no paresthesia on injection and incremental injection  Patient Tolerance:comfortable throughout  "block  Complications:no  Additional Notes  A high-frequency linear transducer, with sterile cover, was placed in the supraclavicular fossa to identify the subclavian artery and trunks and divisions of the brachial plexus. The insertion site was prepped and draped in sterile fashion. Skin and cutaneous tissue was infiltrated with 2-5 ml of 1% Lidocaine. Using ultrasound-guidance, a 20-gauge B-Walters 4\" Ultraplex 360 non-stimulating echogenic needle was advanced in plane from lateral to medial. Preservative-free normal saline was utilized for hydro-dissection of tissue, and to confirm final needle placement. Local anesthetic in incremental 3-5 ml injections to surround the brachial plexus with particular attention paid to the corner pocket inject near the subclavian artery to assure ulnar nerve coverage. Aspiration every 5 ml to prevent intravascular injection. Injection was completed with negative aspiration of blood and negative intravascular injection. Injection pressures were normal with minimal resistance.            "

## 2024-06-03 RX ORDER — CARVEDILOL 3.12 MG/1
3.12 TABLET ORAL 2 TIMES DAILY WITH MEALS
Qty: 180 TABLET | Refills: 3 | Status: SHIPPED | OUTPATIENT
Start: 2024-06-03

## 2024-09-04 ENCOUNTER — HOSPITAL ENCOUNTER (EMERGENCY)
Facility: HOSPITAL | Age: 59
Discharge: HOME | End: 2024-09-04
Attending: EMERGENCY MEDICINE
Payer: MEDICARE

## 2024-09-04 ENCOUNTER — APPOINTMENT (OUTPATIENT)
Dept: GENERAL RADIOLOGY | Facility: HOSPITAL | Age: 59
End: 2024-09-04
Payer: MEDICARE

## 2024-09-04 VITALS
OXYGEN SATURATION: 92 % | RESPIRATION RATE: 16 BRPM | DIASTOLIC BLOOD PRESSURE: 79 MMHG | HEART RATE: 72 BPM | SYSTOLIC BLOOD PRESSURE: 173 MMHG | BODY MASS INDEX: 44.1 KG/M2 | HEIGHT: 71 IN | TEMPERATURE: 97.8 F | WEIGHT: 315 LBS

## 2024-09-04 DIAGNOSIS — T82.598A MALFUNCTION OF PERIPHERAL INSERTED CENTRAL CATHETER, INITIAL ENCOUNTER: Primary | ICD-10-CM

## 2024-09-04 PROCEDURE — 71046 X-RAY EXAM CHEST 2 VIEWS: CPT

## 2024-09-04 PROCEDURE — 99283 EMERGENCY DEPT VISIT LOW MDM: CPT

## 2024-09-04 RX ORDER — LIDOCAINE HYDROCHLORIDE 10 MG/ML
5 INJECTION, SOLUTION EPIDURAL; INFILTRATION; INTRACAUDAL; PERINEURAL ONCE
Status: COMPLETED | OUTPATIENT
Start: 2024-09-04 | End: 2024-09-04

## 2024-09-04 RX ADMIN — LIDOCAINE HYDROCHLORIDE 5 ML: 10 INJECTION, SOLUTION EPIDURAL; INFILTRATION; INTRACAUDAL; PERINEURAL at 20:48

## 2024-09-04 NOTE — ED PROVIDER NOTES
Subjective   History of Present Illness  Patient is a pleasant 39-year-old male with history of chronic kidney disease, home dialysis.  Presents to the emergency department today due to a security suture becoming dislodged from his catheter.  He has a tunneled catheter and that he uses for his dialysis treatments.  Is in his right upper chest wall.  He has 2 sutures securing the catheter.  One of the sutures pulled out and the patient and family believe that the catheter is a few millimeters outside of the skin and they do not believe it was a few millimeters outside the skin prior to that 1 suture being pulled out.  The other suture is still securing the catheter well.  The catheter has been functioning appropriately and flushing well.  Was recommended to them that they come to the emergency department to resecure the catheter and to get an x-ray to ensure that the tip of the catheter still placed appropriately.  Again they deny any acute symptoms.      Review of Systems   All other systems reviewed and are negative.      Past Medical History:   Diagnosis Date    Bronchitis     Chronic kidney disease     Dialysis patient     DAILY M-F    Elevated cholesterol     Hyperlipidemia     Hypertension     Iron deficiency     Sleep apnea 10/2023    CPAP nightly    Type 2 diabetes mellitus        No Known Allergies    Past Surgical History:   Procedure Laterality Date    ARTERIOVENOUS FISTULA/SHUNT SURGERY Left 5/23/2024    Procedure: UPPER ARTERIOVENOUS FISTULA FORMATION WITH ARTEGRAFT LEFT, BRACHIOAXILLARY;  Surgeon: Gume Allen MD;  Location: [facility];  Service: Vascular;  Laterality: Left;    PORTACATH PLACEMENT Right     ROTATOR CUFF REPAIR Left        Family History   Problem Relation Age of Onset    Hypertension Mother     Lung disease Mother     Heart disease Mother     Hypertension Father     Diabetes Father     Kidney disease Father     Heart attack Father     Stroke Maternal Grandmother     Stroke  Paternal Grandmother        Social History     Socioeconomic History    Marital status:    Tobacco Use    Smoking status: Never     Passive exposure: Past    Smokeless tobacco: Never   Vaping Use    Vaping status: Never Used   Substance and Sexual Activity    Alcohol use: Yes     Comment: RARE    Drug use: No    Sexual activity: Defer           Objective   Physical Exam  Vitals and nursing note reviewed.   Constitutional:       General: He is not in acute distress.  HENT:      Head: Normocephalic and atraumatic.   Eyes:      Conjunctiva/sclera: Conjunctivae normal.      Pupils: Pupils are equal, round, and reactive to light.   Neck:      Thyroid: No thyromegaly.   Cardiovascular:      Rate and Rhythm: Normal rate and regular rhythm.      Heart sounds: Normal heart sounds. No murmur heard.     No friction rub. No gallop.   Pulmonary:      Effort: Pulmonary effort is normal. No respiratory distress.      Breath sounds: Normal breath sounds.   Abdominal:      General: Bowel sounds are normal.      Palpations: Abdomen is soft.      Tenderness: There is no abdominal tenderness.   Musculoskeletal:         General: Normal range of motion.      Cervical back: Normal range of motion and neck supple.      Comments: Fistula located in left upper extremity.    Dialysis tunnel catheter right upper chest wall.  The catheter is secure.  Is unable to be withdrawn or advanced likely secondary to his both adherence to the tissue along with the remaining suture which is securely in place.  There is 1 suture missing consistent with the described complaint.   Lymphadenopathy:      Cervical: No cervical adenopathy.   Skin:     General: Skin is warm and dry.   Neurological:      Mental Status: He is alert and oriented to person, place, and time.         Laceration Repair    Date/Time: 9/8/2024 11:55 AM    Performed by: Sascha Palacios DO  Authorized by: Sascha Palacios DO    Consent:     Consent obtained:  Verbal    Consent given by:   Patient and spouse    Risks, benefits, and alternatives were discussed: yes      Risks discussed:  Infection and pain    Alternatives discussed:  No treatment, delayed treatment and observation  Universal protocol:     Procedure explained and questions answered to patient or proxy's satisfaction: yes      Relevant documents present and verified: yes      Test results available: yes      Imaging studies available: yes      Required blood products, implants, devices, and special equipment available: yes      Immediately prior to procedure, a time out was called: yes      Patient identity confirmed:  Arm band  Anesthesia:     Anesthesia method:  Local infiltration    Local anesthetic:  Lidocaine 1% w/o epi  Laceration details:     Location: Procedures for placement of securing suture to the dialysis catheter.  There is no laceration.  Exploration:     Wound extent: no foreign bodies/material noted, no nerve damage noted, no tendon damage noted, no underlying fracture noted and no vascular damage noted      Contaminated: no    Treatment:     Area cleansed with:  Chlorhexidine    Amount of cleaning:  Standard  Skin repair:     Repair method: Single suture placed to secure catheter.  Post-procedure details:     Dressing:  Sterile dressing    Procedure completion:  Tolerated well, no immediate complications             ED Course  ED Course as of 09/08/24 1150   Wed Sep 04, 2024   1808 Currently awaiting patient placement in a room cycle placed the suture to secure the catheter. [CP]   1839 X-ray had resulted at 1716.  Placement is good and unchanged.  Nursing staff notified regarding need for room placement for suturing/securing line.  Currently awaiting room to complete treatment and disposition. [CP]      ED Course User Index  [CP] Sascha Palacios DO          XR Chest 2 View   Final Result   Impression:   Stable dialysis catheter position. Mild bibasilar opacities similar to prior favoring atelectasis.        "  Electronically Signed: Yg Apple MD     9/4/2024 5:16 PM EDT     Workstation ID: [id number]        Vitals:    09/04/24 1636 09/04/24 2001   BP: 167/71 173/79   BP Location: Right arm    Patient Position: Sitting    Pulse: 71 72   Resp: 16    Temp: 97.8 °F (36.6 °C)    TempSrc: Oral    SpO2: 99% 92%   Weight: (!) 143 kg (315 lb)    Height: 180.3 cm (71\")      Medications   lidocaine PF 1% (XYLOCAINE) injection 5 mL (5 mL Injection Given by Other 9/4/24 2048)     ECG/EMG Results (last 24 hours)       ** No results found for the last 24 hours. **          No orders to display                                              Medical Decision Making  Suture replaced.  Used sterile technique.  Catheter appears to be securely held now and chest x-ray confirms that the tip of the catheter still is appropriately placed.  Patient and family both comfortable with discharge at this time.    Problems Addressed:  Malfunction of peripheral inserted central catheter, initial encounter: complicated acute illness or injury    Amount and/or Complexity of Data Reviewed  Labs: ordered. Decision-making details documented in ED Course.  Radiology: ordered and independent interpretation performed. Decision-making details documented in ED Course.     Details: I personally viewed images    Risk  Prescription drug management.        Final diagnoses:   Malfunction of peripheral inserted central catheter, initial encounter       ED Disposition  ED Disposition       ED Disposition   Discharge    Condition   Stable    Comment   --               DISCHARGE    Patient discharged in stable condition.    Reviewed implications of results, diagnosis, meds, responsibility to follow up, warning signs and symptoms of possible worsening, potential complications and reasons to return to ER.    Patient/Family voiced understanding of above instructions.    Discussed plan for discharge, as there is no emergent indication for admission.  Pt/family is agreeable " and understands need for follow up and possible repeat testing.  Pt/family is aware that discharge does not mean that nothing is wrong but that it indicates no emergency is currently present that requires admission and they must continue care with follow-up as given below or with a physician of their choice.     FOLLOW-UP  Larissa Alexandre, YUSRA  [address] [address]  [phone]    Schedule an appointment as soon as possible for a visit       [facility] EMERGENCY DEPARTMENT  [address] [facility] [address] [phone]    If symptoms worsen         Medication List      No changes were made to your prescriptions during this visit.     '       Sascha Palacios,   09/08/24 1152

## 2024-09-28 ENCOUNTER — HOSPITAL ENCOUNTER (OUTPATIENT)
Facility: HOSPITAL | Age: 59
Setting detail: OBSERVATION
Discharge: HOME | End: 2024-09-28
Attending: EMERGENCY MEDICINE | Admitting: INTERNAL MEDICINE
Payer: MEDICARE

## 2024-09-28 VITALS
WEIGHT: 315 LBS | SYSTOLIC BLOOD PRESSURE: 185 MMHG | HEART RATE: 70 BPM | RESPIRATION RATE: 16 BRPM | TEMPERATURE: 98.1 F | HEIGHT: 71 IN | BODY MASS INDEX: 44.1 KG/M2 | OXYGEN SATURATION: 95 % | DIASTOLIC BLOOD PRESSURE: 79 MMHG

## 2024-09-28 DIAGNOSIS — Z99.2 ESRD ON HEMODIALYSIS: ICD-10-CM

## 2024-09-28 DIAGNOSIS — E87.5 HYPERKALEMIA: Primary | ICD-10-CM

## 2024-09-28 DIAGNOSIS — N18.6 ESRD ON HEMODIALYSIS: ICD-10-CM

## 2024-09-28 DIAGNOSIS — Z86.39 HISTORY OF DIABETES MELLITUS: ICD-10-CM

## 2024-09-28 DIAGNOSIS — Z86.79 HISTORY OF HYPERTENSION: ICD-10-CM

## 2024-09-28 LAB
ALBUMIN SERPL-MCNC: 4.2 G/DL (ref 3.5–5.2)
ALBUMIN/GLOB SERPL: 1.6 G/DL
ALP SERPL-CCNC: 82 U/L (ref 39–117)
ALT SERPL W P-5'-P-CCNC: 12 U/L (ref 1–41)
ANION GAP SERPL CALCULATED.3IONS-SCNC: 13 MMOL/L (ref 5–15)
AST SERPL-CCNC: 16 U/L (ref 1–40)
BASOPHILS # BLD AUTO: 0.04 10*3/MM3 (ref 0–0.2)
BASOPHILS NFR BLD AUTO: 0.4 % (ref 0–1.5)
BILIRUB SERPL-MCNC: 0.3 MG/DL (ref 0–1.2)
BUN SERPL-MCNC: 77 MG/DL (ref 6–20)
BUN/CREAT SERPL: 7.8 (ref 7–25)
CALCIUM SPEC-SCNC: 8.8 MG/DL (ref 8.6–10.5)
CHLORIDE SERPL-SCNC: 101 MMOL/L (ref 98–107)
CO2 SERPL-SCNC: 23 MMOL/L (ref 22–29)
CREAT SERPL-MCNC: 9.85 MG/DL (ref 0.76–1.27)
DEPRECATED RDW RBC AUTO: 46.5 FL (ref 37–54)
EGFRCR SERPLBLD CKD-EPI 2021: 5.6 ML/MIN/1.73
EOSINOPHIL # BLD AUTO: 0.22 10*3/MM3 (ref 0–0.4)
EOSINOPHIL NFR BLD AUTO: 2.4 % (ref 0.3–6.2)
ERYTHROCYTE [DISTWIDTH] IN BLOOD BY AUTOMATED COUNT: 13.3 % (ref 12.3–15.4)
GLOBULIN UR ELPH-MCNC: 2.6 GM/DL
GLUCOSE SERPL-MCNC: 116 MG/DL (ref 65–99)
HCT VFR BLD AUTO: 29.8 % (ref 37.5–51)
HGB BLD-MCNC: 9.7 G/DL (ref 13–17.7)
IMM GRANULOCYTES # BLD AUTO: 0.02 10*3/MM3 (ref 0–0.05)
IMM GRANULOCYTES NFR BLD AUTO: 0.2 % (ref 0–0.5)
LYMPHOCYTES # BLD AUTO: 1.86 10*3/MM3 (ref 0.7–3.1)
LYMPHOCYTES NFR BLD AUTO: 20.5 % (ref 19.6–45.3)
MCH RBC QN AUTO: 30.5 PG (ref 26.6–33)
MCHC RBC AUTO-ENTMCNC: 32.6 G/DL (ref 31.5–35.7)
MCV RBC AUTO: 93.7 FL (ref 79–97)
MONOCYTES # BLD AUTO: 0.79 10*3/MM3 (ref 0.1–0.9)
MONOCYTES NFR BLD AUTO: 8.7 % (ref 5–12)
NEUTROPHILS NFR BLD AUTO: 6.14 10*3/MM3 (ref 1.7–7)
NEUTROPHILS NFR BLD AUTO: 67.8 % (ref 42.7–76)
NRBC BLD AUTO-RTO: 0 /100 WBC (ref 0–0.2)
PLATELET # BLD AUTO: 278 10*3/MM3 (ref 140–450)
PMV BLD AUTO: 9.9 FL (ref 6–12)
POTASSIUM SERPL-SCNC: 5.9 MMOL/L (ref 3.5–5.2)
PROT SERPL-MCNC: 6.8 G/DL (ref 6–8.5)
QT INTERVAL: 420 MS
QTC INTERVAL: 443 MS
RBC # BLD AUTO: 3.18 10*6/MM3 (ref 4.14–5.8)
SODIUM SERPL-SCNC: 137 MMOL/L (ref 136–145)
WBC NRBC COR # BLD AUTO: 9.07 10*3/MM3 (ref 3.4–10.8)

## 2024-09-28 PROCEDURE — 99283 EMERGENCY DEPT VISIT LOW MDM: CPT

## 2024-09-28 PROCEDURE — 93005 ELECTROCARDIOGRAM TRACING: CPT | Performed by: EMERGENCY MEDICINE

## 2024-09-28 PROCEDURE — 63710000001 INSULIN REGULAR HUMAN PER 5 UNITS: Performed by: EMERGENCY MEDICINE

## 2024-09-28 PROCEDURE — 80053 COMPREHEN METABOLIC PANEL: CPT | Performed by: EMERGENCY MEDICINE

## 2024-09-28 PROCEDURE — 99285 EMERGENCY DEPT VISIT HI MDM: CPT

## 2024-09-28 PROCEDURE — 85025 COMPLETE CBC W/AUTO DIFF WBC: CPT | Performed by: EMERGENCY MEDICINE

## 2024-09-28 PROCEDURE — 96374 THER/PROPH/DIAG INJ IV PUSH: CPT

## 2024-09-28 PROCEDURE — G0378 HOSPITAL OBSERVATION PER HR: HCPCS

## 2024-09-28 RX ORDER — SODIUM CHLORIDE 9 MG/ML
40 INJECTION, SOLUTION INTRAVENOUS AS NEEDED
Status: DISCONTINUED | OUTPATIENT
Start: 2024-09-28 | End: 2024-09-28

## 2024-09-28 RX ORDER — ACETAMINOPHEN 650 MG/1
650 SUPPOSITORY RECTAL EVERY 4 HOURS PRN
Status: DISCONTINUED | OUTPATIENT
Start: 2024-09-28 | End: 2024-09-28

## 2024-09-28 RX ORDER — ACETAMINOPHEN 325 MG/1
650 TABLET ORAL EVERY 4 HOURS PRN
Status: DISCONTINUED | OUTPATIENT
Start: 2024-09-28 | End: 2024-09-28

## 2024-09-28 RX ORDER — ACETAMINOPHEN 160 MG/5ML
650 SOLUTION ORAL EVERY 4 HOURS PRN
Status: DISCONTINUED | OUTPATIENT
Start: 2024-09-28 | End: 2024-09-28

## 2024-09-28 RX ORDER — DEXTROSE MONOHYDRATE 25 G/50ML
25 INJECTION, SOLUTION INTRAVENOUS ONCE
Status: COMPLETED | OUTPATIENT
Start: 2024-09-28 | End: 2024-09-28

## 2024-09-28 RX ORDER — SODIUM CHLORIDE 0.9 % (FLUSH) 0.9 %
10 SYRINGE (ML) INJECTION AS NEEDED
Status: DISCONTINUED | OUTPATIENT
Start: 2024-09-28 | End: 2024-09-28

## 2024-09-28 RX ORDER — NITROGLYCERIN 0.4 MG/1
0.4 TABLET SUBLINGUAL
Status: DISCONTINUED | OUTPATIENT
Start: 2024-09-28 | End: 2024-09-28

## 2024-09-28 RX ORDER — ONDANSETRON 2 MG/ML
4 INJECTION INTRAMUSCULAR; INTRAVENOUS EVERY 6 HOURS PRN
Status: DISCONTINUED | OUTPATIENT
Start: 2024-09-28 | End: 2024-09-28

## 2024-09-28 RX ORDER — HYDROCODONE BITARTRATE AND ACETAMINOPHEN 5; 325 MG/1; MG/1
1 TABLET ORAL EVERY 6 HOURS PRN
Status: DISCONTINUED | OUTPATIENT
Start: 2024-09-28 | End: 2024-09-28

## 2024-09-28 RX ORDER — SODIUM CHLORIDE 0.9 % (FLUSH) 0.9 %
10 SYRINGE (ML) INJECTION EVERY 12 HOURS SCHEDULED
Status: DISCONTINUED | OUTPATIENT
Start: 2024-09-28 | End: 2024-09-28

## 2024-09-28 RX ADMIN — INSULIN HUMAN 5 UNITS: 100 INJECTION, SOLUTION PARENTERAL at 19:51

## 2024-09-28 RX ADMIN — SODIUM ZIRCONIUM CYCLOSILICATE 10 G: 10 POWDER, FOR SUSPENSION ORAL at 19:51

## 2024-09-28 RX ADMIN — DEXTROSE MONOHYDRATE 25 G: 25 INJECTION, SOLUTION INTRAVENOUS at 19:51

## 2024-09-28 NOTE — ED PROVIDER NOTES
[facility]    EMERGENCY DEPARTMENT ENCOUNTER      Pt Name: Amrita Buckley II  MRN: [id number]  YOB: 1965  Date of evaluation: 9/28/2024  Provider: Feroz Burgos MD    CHIEF COMPLAINT       Chief Complaint   Patient presents with    Hemodialysis Problem         HISTORY OF PRESENT ILLNESS   Amrita Buckley II is a 59 y.o. male who presents to the emergency department ***       Nursing notes were reviewed.    REVIEW OF SYSTEMS     ROS:  A chief complaint appropriate review of systems was completed and is negative except as noted in the HPI.      PAST MEDICAL HISTORY     Past Medical History:   Diagnosis Date    Bronchitis     Chronic kidney disease     Dialysis patient     DAILY M-F    Elevated cholesterol     Hyperlipidemia     Hypertension     Iron deficiency     Sleep apnea 10/2023    CPAP nightly    Type 2 diabetes mellitus          SURGICAL HISTORY       Past Surgical History:   Procedure Laterality Date    ARTERIOVENOUS FISTULA/SHUNT SURGERY Left 5/23/2024    Procedure: UPPER ARTERIOVENOUS FISTULA FORMATION WITH ARTEGRAFT LEFT, BRACHIOAXILLARY;  Surgeon: Gume Allen MD;  Location: [facility];  Service: Vascular;  Laterality: Left;    PORTACATH PLACEMENT Right     ROTATOR CUFF REPAIR Left          CURRENT MEDICATIONS     No current facility-administered medications for this encounter.    Current Outpatient Medications:     albuterol sulfate  (90 Base) MCG/ACT inhaler, Inhale 2 puffs Every 4 (Four) Hours As Needed for Wheezing., Disp: 6.7 g, Rfl: 0    amLODIPine (NORVASC) 10 MG tablet, Take 1 tablet by mouth Daily., Disp: , Rfl:     atorvastatin (LIPITOR) 10 MG tablet, Take 1 tablet by mouth Daily., Disp: , Rfl:     Azelastine HCl 137 MCG/SPRAY solution, Administer 1 spray into each nostril twice daily, Disp: , Rfl:     BD Pen Needle Vidhya 2nd Gen 32G X 4 MM misc, USE ONCE DAILY WITH VICTOZA, Disp: 50 each, Rfl: 0    bumetanide (BUMEX) 2 MG tablet, Take 1 tablet by mouth 2 (Two)  Times a Day., Disp: , Rfl:     calcitriol (ROCALTROL) 0.25 MCG capsule, Take 1 capsule by mouth Daily., Disp: , Rfl:     carvedilol (COREG) 3.125 MG tablet, Take 1 tablet by mouth 2 (Two) Times a Day With Meals., Disp: 180 tablet, Rfl: 3    Continuous Blood Gluc Sensor (FreeStyle Margo 2 Sensor) misc, , Disp: , Rfl:     docusate sodium (COLACE) 250 MG capsule, Take 1 capsule by mouth Daily., Disp: 10 capsule, Rfl: 0    FLUTICASONE PROPIONATE, NASAL, NA, 50 mcg into the nostril(s) as directed by provider Daily., Disp: , Rfl:     hydrALAZINE (APRESOLINE) 50 MG tablet, Take 1 tablet by mouth 3 (Three) Times a Day for 30 days., Disp: 90 tablet, Rfl: 0    HYDROcodone-acetaminophen (NORCO) 5-325 MG per tablet, Take 1 tablet by mouth Every 8 (Eight) Hours As Needed for Moderate Pain (Pain)., Disp: 10 tablet, Rfl: 0    Insulin Glargine (Lantus SoloStar) 100 UNIT/ML injection pen, Inject 18 Units under the skin into the appropriate area as directed Daily., Disp: , Rfl:     Insulin Lispro (humaLOG) 100 UNIT/ML injection, Inject  under the skin into the appropriate area as directed 3 (Three) Times a Day Before Meals. 6 unit bolus + sliding scale, 10 unit bolus if eating a high carb meal, Disp: , Rfl:     ipratropium (ATROVENT) 0.03 % nasal spray, 2 sprays into the nostril(s) as directed by provider Every 12 (Twelve) Hours., Disp: 1 each, Rfl: 11    Iron Sucrose (VENOFER IV), Infuse 100 mg into a venous catheter 3 (Three) Times a Week., Disp: , Rfl:     isosorbide mononitrate (IMDUR) 60 MG 24 hr tablet, Take 1 tablet by mouth Daily., Disp: , Rfl:     levocetirizine (Xyzal Allergy 24HR) 5 MG tablet, Take 1 tablet by mouth Daily., Disp: , Rfl:     loratadine (CLARITIN) 10 MG tablet, Take 1 tablet by mouth Daily., Disp: , Rfl:     Methoxy PEG-Epoetin Beta (MIRCERA IJ), Inject 200 mcg under the skin into the appropriate area as directed Every 30 (Thirty) Days. Every 2-4 weeks per clinic instruction, Disp: , Rfl:     montelukast  "(SINGULAIR) 10 MG tablet, Take 1 tablet by mouth Daily., Disp: , Rfl:     pantoprazole (PROTONIX) 40 MG EC tablet, Take 1 tablet by mouth., Disp: , Rfl:     promethazine-dextromethorphan (PROMETHAZINE-DM) 6.25-15 MG/5ML syrup, Take 5 ml's by mouth every 6 hours as needed, Disp: , Rfl:     sevelamer (RENVELA) 800 MG tablet, Take 1 tablet by mouth 3 (Three) Times a Day., Disp: , Rfl:     sodium zirconium cyclosilicate (Lokelma) 10 g pack, Take 10 g by mouth 3 (Three) Times a Week., Disp: , Rfl:     vitamin D (ERGOCALCIFEROL) 1.25 MG (52836 UT) capsule capsule, Take 1 capsule by mouth Every 7 (Seven) Days. Every Sunday, Disp: , Rfl:     ALLERGIES     Patient has no known allergies.    FAMILY HISTORY       Family History   Problem Relation Age of Onset    Hypertension Mother     Lung disease Mother     Heart disease Mother     Hypertension Father     Diabetes Father     Kidney disease Father     Heart attack Father     Stroke Maternal Grandmother     Stroke Paternal Grandmother           SOCIAL HISTORY       Social History     Socioeconomic History    Marital status:    Tobacco Use    Smoking status: Never     Passive exposure: Past    Smokeless tobacco: Never   Vaping Use    Vaping status: Never Used   Substance and Sexual Activity    Alcohol use: Yes     Comment: RARE    Drug use: No    Sexual activity: Defer         PHYSICAL EXAM    (up to 7 for level 4, 8 or more for level 5)     Vitals:    09/28/24 1721   BP: 142/87   BP Location: Right arm   Patient Position: Sitting   Pulse: 67   Resp: 16   Temp: 98.1 °F (36.7 °C)   TempSrc: Oral   SpO2: 95%   Weight: (!) 147 kg (324 lb 1.2 oz)   Height: 180.3 cm (71\")       ***      DIAGNOSTIC RESULTS     EKG: All EKGs are interpreted by the Emergency Department Physician who either signs or Co-signs this chart in the absence of a cardiologist.    No orders to display         RADIOLOGY:   [x] Radiologist's Report Reviewed:  No orders to display       I ordered and " independently reviewed the above noted radiographic studies.        LABS:    I have reviewed and interpreted all of the currently available lab results from this visit (if applicable):  Results for orders placed or performed during the hospital encounter of 09/28/24   CBC Auto Differential    Specimen: Blood   Result Value Ref Range    WBC 9.07 3.40 - 10.80 10*3/mm3    RBC 3.18 (L) 4.14 - 5.80 10*6/mm3    Hemoglobin 9.7 (L) 13.0 - 17.7 g/dL    Hematocrit 29.8 (L) 37.5 - 51.0 %    MCV 93.7 79.0 - 97.0 fL    MCH 30.5 26.6 - 33.0 pg    MCHC 32.6 31.5 - 35.7 g/dL    RDW 13.3 12.3 - 15.4 %    RDW-SD 46.5 37.0 - 54.0 fl    MPV 9.9 6.0 - 12.0 fL    Platelets 278 140 - 450 10*3/mm3    Neutrophil % 67.8 42.7 - 76.0 %    Lymphocyte % 20.5 19.6 - 45.3 %    Monocyte % 8.7 5.0 - 12.0 %    Eosinophil % 2.4 0.3 - 6.2 %    Basophil % 0.4 0.0 - 1.5 %    Immature Grans % 0.2 0.0 - 0.5 %    Neutrophils, Absolute 6.14 1.70 - 7.00 10*3/mm3    Lymphocytes, Absolute 1.86 0.70 - 3.10 10*3/mm3    Monocytes, Absolute 0.79 0.10 - 0.90 10*3/mm3    Eosinophils, Absolute 0.22 0.00 - 0.40 10*3/mm3    Basophils, Absolute 0.04 0.00 - 0.20 10*3/mm3    Immature Grans, Absolute 0.02 0.00 - 0.05 10*3/mm3    nRBC 0.0 0.0 - 0.2 /100 WBC        If labs were ordered, I independently reviewed the results and considered them in treating the patient.      EMERGENCY DEPARTMENT COURSE and DIFFERENTIAL DIAGNOSIS/MDM:   Vitals:  AS OF 19:05 EDT    BP - 142/87  HR - 67  TEMP - 98.1 °F (36.7 °C) (Oral)  O2 SATS - 95%        Discussion below represents my analysis of pertinent findings related to patient's condition, differential diagnosis, treatment plan and final disposition.      Differential diagnosis:  The differential diagnosis associated with the patient's presentation includes: ***      Independent interpretations (ECG/rhythm strip/X-ray/US/CT scan): ***      Additional sources:  Discussed/obtained information from independent historians:   [] Spouse:   []  Parent:   [] Friend:   [] EMS:   [] Other:  External (non-ED) record review:   [] Inpatient record:   [] Office record:   [] Outpatient record:   [] Prior Outpatient labs:   [] Prior Outpatient radiology:   [] Primary Care record:   [] Outside ED record:   [] Other:       Patient's care impacted by:   [] Diabetes   [] Hypertension   [] Coronary Artery Disease   [] Cancer   [] Other:     Care significantly affected by Social Determinants of Health (housing and economic circumstances, unemployment)    [] Yes     [] No   If yes, Patient's care significantly limited by  Social Determinants of Health including:    [] Inadequate housing    [] Low income    [] Alcoholism and drug addiction in family    [] Problems related to primary support group    [] Unemployment    [] Problems related to employment    [] Other Social Determinants of Health:       Consideration of admission/observation vs discharge: ***      I considered prescription management with: ***   [] Pain medication:   [] Antiviral:   [] Antibiotic:   [] Other:    Additional orders considered but not ordered:  The following testing was considered but ultimately not selected after discussion with patient/family: ***    ED Course:           ***    I had a discussion with the patient/family regarding diagnosis, diagnostic results, treatment plan, and medications.  The patient/family indicated understanding of these instructions.  I spent adequate time at the bedside preceding discharge necessary to personally discuss the aftercare instructions, giving patient education, providing explanations of the results of our evaluations/findings, and my decision making to assure that the patient/family understand the plan of care.  Time was allotted to answer questions at that time and throughout the ED course.  Emphasis was placed on timely follow-up after discharge.  I also discussed the potential for the development of an acute emergent condition requiring further  evaluation, admission, or even surgical intervention. I discussed that we found nothing during the visit today indicating the need for further workup, admission, or the presence of an unstable medical condition.  I encouraged the patient to return to the emergency department immediately for ANY concerns, worsening, new complaints, or if symptoms persist and unable to seek follow-up in a timely fashion.  The patient/family expressed understanding and agreement with this plan.  The patient will follow-up with their PCP in 1-2 days for reevaluation.           PROCEDURES:  Procedures    CRITICAL CARE TIME        FINAL IMPRESSION    No diagnosis found.      DISPOSITION/PLAN     ED Disposition       None              Comment: Please note this report has been produced using speech recognition software.      Feroz Burgos MD  Attending Emergency Physician                 ED Disposition   Discharge    Condition   Stable    Comment   Level of Care: Telemetry [5]  Diagnosis: Hyperkalemia [035133]  Admitting Physician: DIANE WILSON III [[id number]]  Attending Physician: DIANE WILSON III [[id number]]  Is patient appropriate for Inpatient Observation Unit?: No [0]  Bed  Request Comments: tele obs not cdu                   Comment: Please note this report has been produced using speech recognition software.      Feroz Burgos MD  Attending Emergency Physician             Feroz Burgos MD  10/01/24 1640

## 2024-09-28 NOTE — Clinical Note
Level of Care: Telemetry [5]   Diagnosis: Hyperkalemia [005607]   Admitting Physician: DIANE WILSON III [[id number]]   Attending Physician: DIANE WILSON III [[id number]]   Is patient appropriate for Inpatient Observation Unit?: No [0]   Bed Request Comments: tele obs not cdu

## 2024-09-29 NOTE — ED NOTES
"Amrita Corbin See II    Nursing Report ED to Floor:  Mental status: a&ox4  Ambulatory status: adlib  Oxygen Therapy:  ra  Cardiac Rhythm: nsr  Admitted from: home  Safety Concerns:  dialysis pt, no BP/sticks on L  Social Issues: na  ED Room #:  06    ED Nurse Phone Extension - [phone] or may call [phone].      HPI:   Chief Complaint   Patient presents with    Hemodialysis Problem       Past Medical History:  Past Medical History:   Diagnosis Date    Bronchitis     Chronic kidney disease     Dialysis patient     DAILY M-F    Elevated cholesterol     Hyperlipidemia     Hypertension     Iron deficiency     Sleep apnea 10/2023    CPAP nightly    Type 2 diabetes mellitus         Past Surgical History:  Past Surgical History:   Procedure Laterality Date    ARTERIOVENOUS FISTULA/SHUNT SURGERY Left 5/23/2024    Procedure: UPPER ARTERIOVENOUS FISTULA FORMATION WITH ARTEGRAFT LEFT, BRACHIOAXILLARY;  Surgeon: Gume Allen MD;  Location: [facility];  Service: Vascular;  Laterality: Left;    PORTACATH PLACEMENT Right     ROTATOR CUFF REPAIR Left         Admitting Doctor:   No admitting provider for patient encounter.    Consulting Provider(s):  Consults       No orders found from 8/30/2024 to 9/29/2024.             Admitting Diagnosis:   The primary encounter diagnosis was Hyperkalemia. A diagnosis of ESRD on hemodialysis was also pertinent to this visit.    Most Recent Vitals:   Vitals:    09/28/24 1721   BP: 142/87   BP Location: Right arm   Patient Position: Sitting   Pulse: 67   Resp: 16   Temp: 98.1 °F (36.7 °C)   TempSrc: Oral   SpO2: 95%   Weight: (!) 147 kg (324 lb 1.2 oz)   Height: 180.3 cm (71\")       Active LDAs/IV Access:   Lines, Drains & Airways       Active LDAs       Name Placement date Placement time Site Days    Peripheral IV 09/28/24 1850 Right Antecubital 09/28/24 1850  Antecubital  less than 1    Supraglottic Airway 5 05/23/24  1416  created via procedure documentation  -- 128    Hemodialysis Cath Double " 04/03/24  1455  Internal Jugular  178                    Labs (abnormal labs have a star):   Labs Reviewed   COMPREHENSIVE METABOLIC PANEL - Abnormal; Notable for the following components:       Result Value    Glucose 116 (*)     BUN 77 (*)     Creatinine 9.85 (*)     Potassium 5.9 (*)     eGFR 5.6 (*)     All other components within normal limits    Narrative:     GFR Normal >60  Chronic Kidney Disease <60  Kidney Failure <15     CBC WITH AUTO DIFFERENTIAL - Abnormal; Notable for the following components:    RBC 3.18 (*)     Hemoglobin 9.7 (*)     Hematocrit 29.8 (*)     All other components within normal limits   CBC AND DIFFERENTIAL    Narrative:     The following orders were created for panel order CBC & Differential.  Procedure                               Abnormality         Status                     ---------                               -----------         ------                     CBC Auto Differential[[id number]]        Abnormal            Final result                 Please view results for these tests on the individual orders.       Meds Given in ED:   Medications   sodium zirconium cyclosilicate (LOKELMA) packet 10 g (10 g Oral Given 9/28/24 1951)   insulin regular (humuLIN R,novoLIN R) injection 5 Units (5 Units Intravenous Given 9/28/24 1951)   dextrose (D50W) (25 g/50 mL) IV injection 25 g (25 g Intravenous Given 9/28/24 1951)     No current facility-administered medications for this encounter.       Last NIH score:                                                          Dysphagia screening results:  Patient Factors Component (Dysphagia:Stroke or Rule-out)  Best Eye Response: 4-->(E4) spontaneous (09/28/24 1826)  Best Motor Response: 6-->(M6) obeys commands (09/28/24 1826)  Best Verbal Response: 5-->(V5) oriented (09/28/24 1826)  Sathya Coma Scale Score: 15 (09/28/24 1826)     Percival Coma Scale:  No data recorded     CIWA:        Restraint Type:            Isolation Status:  No active  isolations

## 2024-10-03 LAB
QT INTERVAL: 420 MS
QTC INTERVAL: 443 MS

## 2024-11-20 ENCOUNTER — ANESTHESIA EVENT (OUTPATIENT)
Dept: PERIOP | Facility: HOSPITAL | Age: 59
End: 2024-11-20
Payer: MEDICARE

## 2024-11-21 ENCOUNTER — HOSPITAL ENCOUNTER (OUTPATIENT)
Facility: HOSPITAL | Age: 59
Setting detail: HOSPITAL OUTPATIENT SURGERY
Discharge: HOME | End: 2024-11-21
Attending: SURGERY | Admitting: SURGERY
Payer: MEDICARE

## 2024-11-21 ENCOUNTER — ANESTHESIA (OUTPATIENT)
Dept: PERIOP | Facility: HOSPITAL | Age: 59
End: 2024-11-21
Payer: MEDICARE

## 2024-11-21 VITALS
HEART RATE: 71 BPM | WEIGHT: 315 LBS | SYSTOLIC BLOOD PRESSURE: 172 MMHG | OXYGEN SATURATION: 91 % | BODY MASS INDEX: 44.1 KG/M2 | TEMPERATURE: 98.1 F | RESPIRATION RATE: 18 BRPM | DIASTOLIC BLOOD PRESSURE: 78 MMHG | HEIGHT: 71 IN

## 2024-11-21 LAB — GLUCOSE BLDC GLUCOMTR-MCNC: 200 MG/DL (ref 70–130)

## 2024-11-21 PROCEDURE — 25010000002 LIDOCAINE 1 % SOLUTION: Performed by: SURGERY

## 2024-11-21 PROCEDURE — 85027 COMPLETE CBC AUTOMATED: CPT

## 2024-11-21 PROCEDURE — 82948 REAGENT STRIP/BLOOD GLUCOSE: CPT

## 2024-11-21 RX ORDER — MIDAZOLAM HYDROCHLORIDE 1 MG/ML
1 INJECTION, SOLUTION INTRAMUSCULAR; INTRAVENOUS
Status: DISCONTINUED | OUTPATIENT
Start: 2024-11-21 | End: 2024-11-21 | Stop reason: HOSPADM

## 2024-11-21 RX ORDER — SODIUM CHLORIDE, SODIUM LACTATE, POTASSIUM CHLORIDE, CALCIUM CHLORIDE 600; 310; 30; 20 MG/100ML; MG/100ML; MG/100ML; MG/100ML
9 INJECTION, SOLUTION INTRAVENOUS CONTINUOUS
Status: DISCONTINUED | OUTPATIENT
Start: 2024-11-22 | End: 2024-11-23 | Stop reason: HOSPADM

## 2024-11-21 RX ORDER — FAMOTIDINE 20 MG/1
20 TABLET, FILM COATED ORAL ONCE
Status: COMPLETED | OUTPATIENT
Start: 2024-11-21 | End: 2024-11-21

## 2024-11-21 RX ORDER — BUPIVACAINE HYDROCHLORIDE AND EPINEPHRINE 2.5; 5 MG/ML; UG/ML
INJECTION, SOLUTION EPIDURAL; INFILTRATION; INTRACAUDAL; PERINEURAL AS NEEDED
Status: DISCONTINUED | OUTPATIENT
Start: 2024-11-21 | End: 2024-11-21 | Stop reason: HOSPADM

## 2024-11-21 RX ORDER — LIDOCAINE HYDROCHLORIDE 10 MG/ML
0.5 INJECTION, SOLUTION EPIDURAL; INFILTRATION; INTRACAUDAL; PERINEURAL ONCE AS NEEDED
Status: DISCONTINUED | OUTPATIENT
Start: 2024-11-21 | End: 2024-11-21 | Stop reason: HOSPADM

## 2024-11-21 RX ORDER — FAMOTIDINE 10 MG/ML
20 INJECTION, SOLUTION INTRAVENOUS ONCE
Status: DISCONTINUED | OUTPATIENT
Start: 2024-11-21 | End: 2024-11-21 | Stop reason: HOSPADM

## 2024-11-21 RX ORDER — LIDOCAINE HYDROCHLORIDE 10 MG/ML
INJECTION, SOLUTION INFILTRATION; PERINEURAL AS NEEDED
Status: DISCONTINUED | OUTPATIENT
Start: 2024-11-21 | End: 2024-11-21 | Stop reason: HOSPADM

## 2024-11-21 RX ORDER — SODIUM CHLORIDE 0.9 % (FLUSH) 0.9 %
10 SYRINGE (ML) INJECTION EVERY 12 HOURS SCHEDULED
Status: DISCONTINUED | OUTPATIENT
Start: 2024-11-21 | End: 2024-11-21 | Stop reason: HOSPADM

## 2024-11-21 RX ORDER — SODIUM CHLORIDE 0.9 % (FLUSH) 0.9 %
10 SYRINGE (ML) INJECTION AS NEEDED
Status: DISCONTINUED | OUTPATIENT
Start: 2024-11-21 | End: 2024-11-21 | Stop reason: HOSPADM

## 2024-11-21 RX ADMIN — FAMOTIDINE 20 MG: 20 TABLET, FILM COATED ORAL at 12:23

## 2024-11-21 NOTE — INTERVAL H&P NOTE
"[facility] Pre-op    Full history and physical note from office is attached.    /79 (BP Location: Right arm, Patient Position: Sitting)   Pulse 69   Temp 98.4 °F (36.9 °C) (Temporal)   Resp 18   Ht 180.3 cm (71\")   Wt (!) 146 kg (322 lb)   SpO2 95%   BMI 44.91 kg/m²     Review of Systems:  Constitutional-- No fever, chills or sweats. No fatigue.  CV-- No chest pain, palpitation or syncope  Resp-- No SOB, cough, hemoptysis  Skin--No rashes or lesions    Physical Exam:  Heart:   Regular rate and rhythm, S1 and S2 normal  Lungs: Clear to auscultation bilaterally, respirations unlabored    LAB Results:  Lab Results   Component Value Date    WBC 9.07 09/28/2024    HGB 9.7 (L) 09/28/2024    HCT 29.8 (L) 09/28/2024    MCV 93.7 09/28/2024     09/28/2024    NEUTROABS 6.8 11/11/2024    GLUCOSE 116 (H) 09/28/2024    BUN 77 (H) 09/28/2024    CREATININE 9.85 (H) 09/28/2024    EGFRIFNONA 45 (L) 11/17/2021    EGFRIFAFRI 65 08/13/2015     09/28/2024    K 5.9 (H) 09/28/2024     09/28/2024    CO2 23.0 09/28/2024    MG 2.5 04/03/2024    CALCIUM 8.8 09/28/2024    ALBUMIN 4.2 09/28/2024    AST 16 09/28/2024    ALT 12 09/28/2024    BILITOT 0.3 09/28/2024    PTT 28 05/28/2023    INR 1.01 05/15/2024       Cancer Staging (if applicable)  Cancer Patient: __ yes __no __unknown__N/A; If yes, clinical stage T:__ N:__M:__, stage group or __N/A      Impression: Stage V chronic kidney disease      Plan: REMOVAL TUNNELED DIALYSIS CATHETER       YUSRA Mccullough   11/21/2024   12:21 EST  "

## 2024-11-21 NOTE — ANESTHESIA PREPROCEDURE EVALUATION
Anesthesia Evaluation     Patient summary reviewed and Nursing notes reviewed   NPO Solid Status: > 8 hours  NPO Liquid Status: > 2 hours           Airway   Mallampati: III  TM distance: >3 FB  Neck ROM: full  Possible difficult intubation, Large neck circumference and Small opening  Dental      Pulmonary    (+) asthma (bronchitis after Covid still takes MDI),shortness of breath, sleep apnea on CPAP  (-) recent URI, not a smoker, no home oxygen  Cardiovascular     ECG reviewed    (+) hypertension, CHF , hyperlipidemia  (-) past MI, dysrhythmias, angina, cardiac stents    ROS comment: ECHO 2023  EF>58%.   RV mildly dilated. RV  systolic function is normal.  RVSP. Poor signal but based upon other 2D and Doppler features, the RVSP is probably normal or at most mildly elevated.   valves were reasonably well visualized- no significant valve regurgitation or stenosis    MPS 2023 normal with no evidence of ischemia.  Diaphragmatic attenuation artifact is present.  normal ECG stress test.    .       Neuro/Psych  (-) seizures, CVA  GI/Hepatic/Renal/Endo    (+) obesity, morbid obesity, GERD, liver disease history of elevated LFT, renal disease- CRI, ESRD and dialysis, diabetes mellitus (A1C\ <7) type 2 well controlled    Musculoskeletal     Abdominal    Substance History      OB/GYN          Other        ROS/Med Hx Other: HCT 33                Anesthesia Plan    ASA 4     general     (Needs pre op K   McG 4 if GETA )  intravenous induction     Anesthetic plan, risks, benefits, and alternatives have been provided, discussed and informed consent has been obtained with: patient.    Plan discussed with CRNA.      CODE STATUS:

## 2024-11-21 NOTE — OP NOTE
General Surgery Operative Note    Amrita Corbin See II  [id number]  1965    Date of Surgery:  11/21/2024 14:03 EST    Pre-op Diagnosis: Chronic renal failure    Post-op Diagnosis: Chronic renal failure    Procedure: Removal tunneled hemodialysis catheter    Surgeon: Gume Allen MD    Circulator: Sid Acevedo RN; Ginny Vilchis RN  Scrub Person: Sammie Perez  Nursing Assistant: Aimee Burgos PCT  Other: Crystal Gutierres RN       Anesthesia: Local     ASA Score: III    Fluids: None, local case    Estimated Blood Loss: Less than 25 mL    Complications: None apparent    History:   59-year-old gentleman with chronic renal failure currently with a functioning left upper extremity arteriovenous fistula no longer requires his tunneled hemodialysis catheter for dialysis.      The risks and benefits of removal tunneled hemodialysis catheter were rehashed.  Our discussion included but was not limited to: bleeding, infection, injury to adjacent viscera, retained foreign body, embolism, and medical issues from a cardiopulmonary and deep venous thrombosis standpoint.  All questions were answered and they understood and wished to proceed with surgical intervention.    Procedure:      After informed consent, the patient was taken to the operating room and placed in the supine position.  Appropriate cardiopulmonary monitoring was placed on the patient.  The right neck and catheter exit site was then prepped and draped in the standard sterile fashion. A time out was observed.     The skin was anesthetized with a combination quarter percent Sensorcaine with epinephrine and 1% Xylocaine.  An incision directly over the catheter cuff was created with the scalpel.  I dissected down through the subcutaneous elements with the electrocautery Bovie to the catheter itself.  The catheter cuff was circumferentially dissected free from the surrounding tissue and the catheter was removed in its entirety.   Hemostasis was obtained with pressure.  The incision was closed with a single layer of interrupted 4-0 Monocryl suture.  The incision was then dressed with Steri-Strips, Telfa, and a chlorhexidine impregnated Tegaderm.         All lap and needle counts were reported as correct at the end of the procedure ×2.  The patient was then transferred to the PACU.    Gume Allen MD     Date: 11/21/2024  Time: 14:03 EST

## 2025-04-10 ENCOUNTER — TELEPHONE (OUTPATIENT)
Dept: INFUSION THERAPY | Facility: HOSPITAL | Age: 60
End: 2025-04-10
Payer: COMMERCIAL

## 2025-04-10 RX ORDER — SEVELAMER HYDROCHLORIDE 800 MG/1
800 TABLET, FILM COATED ORAL
COMMUNITY

## 2025-04-10 NOTE — TELEPHONE ENCOUNTER
Pt contacted as pre-procedure phone call prior to planned fistulagram for 4/11 Reviewed with patient arrival time, location, nothing to eat or drink by mouth, okay to take blood pressure medications morning of procedure with a small sip of water, (blood thinners addressed?),  needed, reviewed procedure instructions and allowed time for questions, and reviewed home medications, allergies, and medical history.

## 2025-04-11 ENCOUNTER — HOSPITAL ENCOUNTER (OUTPATIENT)
Dept: INTERVENTIONAL RADIOLOGY/VASCULAR | Facility: HOSPITAL | Age: 60
Discharge: HOME | End: 2025-04-11
Payer: MEDICARE

## 2025-04-11 VITALS
SYSTOLIC BLOOD PRESSURE: 151 MMHG | OXYGEN SATURATION: 94 % | HEART RATE: 66 BPM | HEIGHT: 71 IN | WEIGHT: 315 LBS | TEMPERATURE: 96.1 F | DIASTOLIC BLOOD PRESSURE: 70 MMHG | BODY MASS INDEX: 44.1 KG/M2 | RESPIRATION RATE: 20 BRPM

## 2025-04-11 DIAGNOSIS — N18.6 ESRD (END STAGE RENAL DISEASE): ICD-10-CM

## 2025-04-11 LAB
ANION GAP SERPL CALCULATED.3IONS-SCNC: 22 MMOL/L (ref 5–15)
BASOPHILS # BLD AUTO: 0.08 10*3/MM3 (ref 0–0.2)
BASOPHILS NFR BLD AUTO: 0.8 % (ref 0–1.5)
BUN SERPL-MCNC: 90 MG/DL (ref 6–20)
BUN/CREAT SERPL: 7.8 (ref 7–25)
CALCIUM SPEC-SCNC: 8.4 MG/DL (ref 8.6–10.5)
CHLORIDE SERPL-SCNC: 99 MMOL/L (ref 98–107)
CO2 SERPL-SCNC: 17 MMOL/L (ref 22–29)
CREAT SERPL-MCNC: 11.56 MG/DL (ref 0.76–1.27)
DEPRECATED RDW RBC AUTO: 50.9 FL (ref 37–54)
EGFRCR SERPLBLD CKD-EPI 2021: 4.6 ML/MIN/1.73
EOSINOPHIL # BLD AUTO: 0.17 10*3/MM3 (ref 0–0.4)
EOSINOPHIL NFR BLD AUTO: 1.7 % (ref 0.3–6.2)
ERYTHROCYTE [DISTWIDTH] IN BLOOD BY AUTOMATED COUNT: 14 % (ref 12.3–15.4)
GLUCOSE SERPL-MCNC: 200 MG/DL (ref 65–99)
HCT VFR BLD AUTO: 36.5 % (ref 37.5–51)
HGB BLD-MCNC: 11.2 G/DL (ref 13–17.7)
IMM GRANULOCYTES # BLD AUTO: 0.03 10*3/MM3 (ref 0–0.05)
IMM GRANULOCYTES NFR BLD AUTO: 0.3 % (ref 0–0.5)
INR PPP: 0.96 (ref 0.89–1.12)
LYMPHOCYTES # BLD AUTO: 1.57 10*3/MM3 (ref 0.7–3.1)
LYMPHOCYTES NFR BLD AUTO: 15.4 % (ref 19.6–45.3)
MCH RBC QN AUTO: 30.4 PG (ref 26.6–33)
MCHC RBC AUTO-ENTMCNC: 30.7 G/DL (ref 31.5–35.7)
MCV RBC AUTO: 99.2 FL (ref 79–97)
MONOCYTES # BLD AUTO: 0.84 10*3/MM3 (ref 0.1–0.9)
MONOCYTES NFR BLD AUTO: 8.2 % (ref 5–12)
NEUTROPHILS NFR BLD AUTO: 7.53 10*3/MM3 (ref 1.7–7)
NEUTROPHILS NFR BLD AUTO: 73.6 % (ref 42.7–76)
NRBC BLD AUTO-RTO: 0 /100 WBC (ref 0–0.2)
PLATELET # BLD AUTO: 297 10*3/MM3 (ref 140–450)
PMV BLD AUTO: 9.9 FL (ref 6–12)
POTASSIUM SERPL-SCNC: 5.9 MMOL/L (ref 3.5–5.2)
PROTHROMBIN TIME: 13.4 SECONDS (ref 12.2–15.3)
RBC # BLD AUTO: 3.68 10*6/MM3 (ref 4.14–5.8)
SODIUM SERPL-SCNC: 138 MMOL/L (ref 136–145)
WBC NRBC COR # BLD AUTO: 10.22 10*3/MM3 (ref 3.4–10.8)

## 2025-04-11 PROCEDURE — 76080 X-RAY EXAM OF FISTULA: CPT

## 2025-04-11 PROCEDURE — 25010000002 FENTANYL CITRATE (PF) 50 MCG/ML SOLUTION: Performed by: STUDENT IN AN ORGANIZED HEALTH CARE EDUCATION/TRAINING PROGRAM

## 2025-04-11 PROCEDURE — 85025 COMPLETE CBC W/AUTO DIFF WBC: CPT | Performed by: STUDENT IN AN ORGANIZED HEALTH CARE EDUCATION/TRAINING PROGRAM

## 2025-04-11 PROCEDURE — C1894 INTRO/SHEATH, NON-LASER: HCPCS | Performed by: STUDENT IN AN ORGANIZED HEALTH CARE EDUCATION/TRAINING PROGRAM

## 2025-04-11 PROCEDURE — 85610 PROTHROMBIN TIME: CPT | Performed by: STUDENT IN AN ORGANIZED HEALTH CARE EDUCATION/TRAINING PROGRAM

## 2025-04-11 PROCEDURE — 80048 BASIC METABOLIC PNL TOTAL CA: CPT | Performed by: STUDENT IN AN ORGANIZED HEALTH CARE EDUCATION/TRAINING PROGRAM

## 2025-04-11 PROCEDURE — 25010000002 MIDAZOLAM PER 1 MG: Performed by: STUDENT IN AN ORGANIZED HEALTH CARE EDUCATION/TRAINING PROGRAM

## 2025-04-11 PROCEDURE — 99152 MOD SED SAME PHYS/QHP 5/>YRS: CPT

## 2025-04-11 PROCEDURE — 25510000001 IOPAMIDOL 61 % SOLUTION: Performed by: STUDENT IN AN ORGANIZED HEALTH CARE EDUCATION/TRAINING PROGRAM

## 2025-04-11 PROCEDURE — 25010000002 LIDOCAINE 1 % SOLUTION: Performed by: STUDENT IN AN ORGANIZED HEALTH CARE EDUCATION/TRAINING PROGRAM

## 2025-04-11 RX ORDER — IOPAMIDOL 612 MG/ML
30 INJECTION, SOLUTION INTRAVASCULAR
Status: COMPLETED | OUTPATIENT
Start: 2025-04-11 | End: 2025-04-11

## 2025-04-11 RX ORDER — FENTANYL CITRATE 50 UG/ML
INJECTION, SOLUTION INTRAMUSCULAR; INTRAVENOUS
Status: DISPENSED
Start: 2025-04-11 | End: 2025-04-11

## 2025-04-11 RX ORDER — MIDAZOLAM HYDROCHLORIDE 1 MG/ML
INJECTION, SOLUTION INTRAMUSCULAR; INTRAVENOUS AS NEEDED
Status: COMPLETED | OUTPATIENT
Start: 2025-04-11 | End: 2025-04-11

## 2025-04-11 RX ORDER — SODIUM CHLORIDE 0.9 % (FLUSH) 0.9 %
10 SYRINGE (ML) INJECTION EVERY 12 HOURS SCHEDULED
Status: DISCONTINUED | OUTPATIENT
Start: 2025-04-11 | End: 2025-04-12 | Stop reason: HOSPADM

## 2025-04-11 RX ORDER — LIDOCAINE HYDROCHLORIDE 10 MG/ML
1 INJECTION, SOLUTION INFILTRATION; PERINEURAL ONCE
Status: COMPLETED | OUTPATIENT
Start: 2025-04-11 | End: 2025-04-11

## 2025-04-11 RX ORDER — MIDAZOLAM HYDROCHLORIDE 1 MG/ML
INJECTION, SOLUTION INTRAMUSCULAR; INTRAVENOUS
Status: DISPENSED
Start: 2025-04-11 | End: 2025-04-11

## 2025-04-11 RX ORDER — SODIUM CHLORIDE 0.9 % (FLUSH) 0.9 %
10 SYRINGE (ML) INJECTION AS NEEDED
Status: DISCONTINUED | OUTPATIENT
Start: 2025-04-11 | End: 2025-04-12 | Stop reason: HOSPADM

## 2025-04-11 RX ORDER — FENTANYL CITRATE 50 UG/ML
INJECTION, SOLUTION INTRAMUSCULAR; INTRAVENOUS AS NEEDED
Status: COMPLETED | OUTPATIENT
Start: 2025-04-11 | End: 2025-04-11

## 2025-04-11 RX ORDER — SODIUM CHLORIDE 9 MG/ML
40 INJECTION, SOLUTION INTRAVENOUS AS NEEDED
Status: DISCONTINUED | OUTPATIENT
Start: 2025-04-11 | End: 2025-04-12 | Stop reason: HOSPADM

## 2025-04-11 RX ADMIN — FENTANYL CITRATE 50 MCG: 50 INJECTION, SOLUTION INTRAMUSCULAR; INTRAVENOUS at 11:04

## 2025-04-11 RX ADMIN — MIDAZOLAM HYDROCHLORIDE 1 MG: 1 INJECTION, SOLUTION INTRAMUSCULAR; INTRAVENOUS at 10:58

## 2025-04-11 RX ADMIN — MIDAZOLAM HYDROCHLORIDE 1 MG: 1 INJECTION, SOLUTION INTRAMUSCULAR; INTRAVENOUS at 11:04

## 2025-04-11 RX ADMIN — LIDOCAINE HYDROCHLORIDE 1 ML: 10 INJECTION, SOLUTION INFILTRATION; PERINEURAL at 11:14

## 2025-04-11 RX ADMIN — IOPAMIDOL 30 ML: 612 INJECTION, SOLUTION INTRAVENOUS at 11:14

## 2025-04-11 RX ADMIN — FENTANYL CITRATE 50 MCG: 50 INJECTION, SOLUTION INTRAMUSCULAR; INTRAVENOUS at 10:58

## 2025-04-11 NOTE — NURSING NOTE
Patient to IR for fistula gram to left upper extremity per Mehrdad Gómez MD.  . Patient positioned supine, VSS.  100 mcg of fentanyl was given and 2 mg of versed for a sedation time of 13 minutes. Venous pressure held for 8 minutes.  Report to [place] [facility].  Post op orders entered.

## 2025-04-14 ENCOUNTER — TELEPHONE (OUTPATIENT)
Dept: INFUSION THERAPY | Facility: HOSPITAL | Age: 60
End: 2025-04-14
Payer: COMMERCIAL

## 2025-05-07 NOTE — PROGRESS NOTES
Sleep Clinic Follow Up Note    Chief Complaint  Sleeping Problem and Sleep Apnea (Follow up)    Subjective     History of Present Illness (from previous encounter on 3/19/2024):  Amrita Buckley II is a 58 y.o. male who returns for follow-up and compliance of PAP therapy.  The Pap report has been reviewed.  Overall usage is at 91% with compliance at 70%.  I have encouraged increase usage he is within full compliance.  Patient averages 5 hours and 2 minutes of therapy.  Sleep apnea is well-controlled with an AHI of 1.0/H. The patient has a history of moderate obstructive sleep apnea with an initial AHI of 19.6/H. I will refill the patient's supplies, and I have asked them to return for follow-up and compliance in 1 year or sooner should they have further questions or concerns. (End copied text).    Interval History:  Amrita Buckley II is a 59 y.o. male returns for follow up and compliance of PAP therapy. The patient was last seen on 3/19/2024 by me. Overall the patient feels poor with regard to therapy over the winter. He fell asleep in a chair and forgot to use it. He is using it more now.  The device appears to be working appropriately. On average the patient sleeps 6-12 hours per night. The patient wakes 1-2 times per night.     The patient reports the following changes to their medical and medication history since they were last seen:  None    Further details are as follows:      [place] Scale is (out of 24): Total score: 11     Weight:  Current Weight: 313 lb    The patient's relevant past medical, surgical, family, and social history reviewed and updated in Epic as appropriate.    PMH:    Past Medical History:   Diagnosis Date    Bronchitis     Chronic kidney disease     Dialysis patient     DAILY M-F    Elevated cholesterol     ESRD on dialysis     GERD (gastroesophageal reflux disease)     Hyperlipidemia     Hypertension     Iron deficiency     Sleep apnea 10/2023    CPAP nightly    Type 2 diabetes mellitus       Past Surgical History:   Procedure Laterality Date    ARTERIOVENOUS FISTULA/SHUNT SURGERY Left 05/23/2024    Procedure: UPPER ARTERIOVENOUS FISTULA FORMATION WITH ARTEGRAFT LEFT, BRACHIOAXILLARY;  Surgeon: Gume Allen MD;  Location:  [facility];  Service: Vascular;  Laterality: Left;    PORTACATH PLACEMENT Right     REMOVAL PERITONEAL DIALYSIS CATHETER N/A 11/21/2024    Procedure: REMOVAL TUNNELED DIALYSIS CATHETER;  Surgeon: Gume Allen MD;  Location:  [facility];  Service: General;  Laterality: N/A;    ROTATOR CUFF REPAIR Left     x2    TUNNELED VENOUS CATHETER PLACEMENT      dialysis       No Known Allergies    MEDS:  Prior to Admission medications    Medication Sig Start Date End Date Taking? Authorizing Provider   albuterol sulfate  (90 Base) MCG/ACT inhaler Inhale 2 puffs Every 4 (Four) Hours As Needed for Wheezing. 11/29/22   Michelle Pérez APRN   amLODIPine (NORVASC) 10 MG tablet Take 1 tablet by mouth Daily. 3/26/24   Maria Ines Jenkins MD   atorvastatin (LIPITOR) 10 MG tablet Take 1 tablet by mouth Daily. 10/9/23   Maria Ines Jenkins MD   Azelastine HCl 137 MCG/SPRAY solution Administer 1 spray into each nostril twice daily 4/16/24   Maria Ines Jenkins MD   BD Pen Needle Vidhya 2nd Gen 32G X 4 MM misc USE ONCE DAILY WITH VICTOZA 9/27/22   Jordi Good APRN   bumetanide (BUMEX) 2 MG tablet Take 1 tablet by mouth 2 (Two) Times a Day. 8/7/23   Maria Ines Jenkins MD   carvedilol (COREG) 3.125 MG tablet Take 1 tablet by mouth 2 (Two) Times a Day With Meals. 6/3/24   Toñito Almazan MD   Continuous Blood Gluc Sensor (FreeStyle Margo 2 Sensor) misc  10/2/23   Maria Ines Jenkins MD   FLUTICASONE PROPIONATE, NASAL, NA 50 mcg into the nostril(s) as directed by provider Daily.    Maria Ines Jenkins MD   hydrALAZINE (APRESOLINE) 50 MG tablet Take 1 tablet by mouth 3 (Three) Times a Day for 30 days.  Patient taking differently: Take 1 tablet by mouth  Every 8 (Eight) Hours As Needed. 4/5/24 11/14/26  Yasmine Mckinnon DO   HYDROcodone-acetaminophen (NORCO) 5-325 MG per tablet Take 1 tablet by mouth Every 8 (Eight) Hours As Needed for Moderate Pain (Pain). 5/23/24   Gume Allen MD   Insulin Glargine (Lantus SoloStar) 100 UNIT/ML injection pen Inject 18 Units under the skin into the appropriate area as directed Daily. 5/10/24   Maria Ines Jenkins MD   Insulin Lispro (humaLOG) 100 UNIT/ML injection Inject  under the skin into the appropriate area as directed 3 (Three) Times a Day Before Meals. 6 unit bolus + sliding scale, 10 unit bolus if eating a high carb meal 4/16/24   Maria Ines Jenkins MD   ipratropium (ATROVENT) 0.03 % nasal spray 2 sprays into the nostril(s) as directed by provider Every 12 (Twelve) Hours. 12/21/23   Jorgito Lynch MD   Iron Sucrose (VENOFER IV) Infuse 100 mg into a venous catheter 3 (Three) Times a Week.    Maria Ines Jenkins MD   isosorbide mononitrate (IMDUR) 60 MG 24 hr tablet Take 1 tablet by mouth Daily.    Maria Ines Jenkins MD   levocetirizine (Xyzal Allergy 24HR) 5 MG tablet Take 1 tablet by mouth Daily. 4/16/24   Maria Ines Jenkins MD   lidocaine-prilocaine (EMLA) 2.5-2.5 % cream APPLY SMALL AMOUNT TO ACCESS SITE (AVF) 1 HOUR BEFORE DIALYSIS. COVER WITH OCCLUSIVE DRESSING (SARAN WRAP) 10/17/24   Maria Ines Jenkins MD   loratadine (CLARITIN) 10 MG tablet Take 1 tablet by mouth Daily. 4/19/24   Maria Ines Jenkins MD   Methoxy PEG-Epoetin Beta (MIRCERA IJ) Inject 200 mcg under the skin into the appropriate area as directed Every 30 (Thirty) Days. Every 2-4 weeks per clinic instruction 4/30/24   Maria Ines Jenkins MD   montelukast (SINGULAIR) 10 MG tablet Take 1 tablet by mouth Daily. 4/16/24   Maria Ines Jenkins MD   pantoprazole (PROTONIX) 40 MG EC tablet Take 1 tablet by mouth. 5/7/24   Maria Ines Jenkins MD   promethazine-dextromethorphan (PROMETHAZINE-DM) 6.25-15 MG/5ML  "syrup Take 5 ml's by mouth every 6 hours as needed 4/11/24   Maria Ines Jenkins MD   sevelamer (RENAGEL) 800 MG tablet Take 1 tablet by mouth With Snacks. 1 tab with 2 snacks a day    Maria Ines Jenkins MD   sevelamer (RENVELA) 800 MG tablet Take 2 tablets by mouth 3 (Three) Times a Day. 4/16/24   Maria Ines Jenkins MD         FH:  Family History   Problem Relation Age of Onset    Hypertension Mother     Lung disease Mother     Heart disease Mother     Hypertension Father     Diabetes Father     Kidney disease Father     Heart attack Father     Stroke Maternal Grandmother     Stroke Paternal Grandmother        Objective   Vital Signs:  /68 (BP Location: Left arm, Patient Position: Sitting, Cuff Size: Adult)   Pulse 70   Temp 97.3 °F (36.3 °C) (Temporal)   Ht 180.3 cm (70.98\")   Wt (!) 142 kg (313 lb 14.4 oz)   SpO2 95%   BMI 43.80 kg/m²     Patient's (Body mass index is 43.8 kg/m².) indicates that they are morbidly obese (BMI > 40 or > 35 with obesity - related health condition)         Physical Exam  Vitals reviewed.   Constitutional:       Appearance: Normal appearance.   HENT:      Head: Normocephalic and atraumatic.      Nose: Nose normal.      Mouth/Throat:      Mouth: Mucous membranes are moist.   Cardiovascular:      Rate and Rhythm: Normal rate and regular rhythm.      Heart sounds: No murmur heard.     No friction rub. No gallop.   Pulmonary:      Effort: Pulmonary effort is normal. No respiratory distress.      Breath sounds: Normal breath sounds. No wheezing or rhonchi.   Neurological:      Mental Status: He is alert and oriented to person, place, and time.   Psychiatric:         Behavior: Behavior normal.               Result Review :           PAP Report:  AHI: 5.6/h  Days of Usage: 46/90 (51%)  Number of Days Greater than 4 hours: 48%  Average time (days used): 6 hours 55 minutes  95th Percentile Pressure: 15 cmH2O  95th percentile leaks: 41.9 L/min  Settings: Auto CPAP-8/18 cm " H2O, EPR full-time, EPR level 1, response standard.       Assessment and Plan  Amrita Buckley II is a 59 y.o. male who returns for follow-up and compliance of PAP therapy.  The Pap report has been reviewed.  Overall usage is at 51% with compliance of 48%.  The patient averaged 6 hours and 55 minutes of therapy.  AHI was 5.6/h.  Encouraged increased usage. He had been falling asleep in a chair forgetting to use the device. I will refill the patient's supplies, and I have asked him to return for follow-up and recheck in approximately 4 months.  I note a large leak at 41.9 L/min.  This may improve with mask fitting/change by [employer] company.    Diagnoses and all orders for this visit:    1. MOY (obstructive sleep apnea) (Primary)  -     PAP Therapy    2. Morbid (severe) obesity due to excess calories         The patient continues to use and benefit from PAP therapy.    1. The patient was counseled regarding multimodal approach with healthy nutrition, healthy sleep, regular physical activity, social activities, counseling, and medications. Encouraged to practice lateral sleep position. Avoid alcohol and sedatives close to bedtime.     2.  We will refill supplies x1 year.  Return to clinic 1 year or sooner if symptoms warrant. I have reviewed the results of my evaluation and impression and discussed my recommendations in detail with the patient.           Follow Up  Return in about 4 months (around 9/13/2025).  Patient was given instructions and counseling regarding his condition or for health maintenance advice. Please see specific information pulled into the AVS if appropriate.       YUSRA Carr, ACNP-BC  Pulmonology, Critical Care, and Sleep Medicine

## 2025-05-13 ENCOUNTER — OFFICE VISIT (OUTPATIENT)
Dept: SLEEP MEDICINE | Age: 60
End: 2025-05-13
Payer: MEDICARE

## 2025-05-13 VITALS
OXYGEN SATURATION: 95 % | HEART RATE: 70 BPM | TEMPERATURE: 97.3 F | BODY MASS INDEX: 43.94 KG/M2 | DIASTOLIC BLOOD PRESSURE: 68 MMHG | WEIGHT: 313.9 LBS | SYSTOLIC BLOOD PRESSURE: 140 MMHG | HEIGHT: 71 IN

## 2025-05-13 DIAGNOSIS — G47.33 OSA (OBSTRUCTIVE SLEEP APNEA): Primary | ICD-10-CM

## 2025-05-13 DIAGNOSIS — E66.01 MORBID (SEVERE) OBESITY DUE TO EXCESS CALORIES: ICD-10-CM

## 2025-05-14 NOTE — PROGRESS NOTES
Follow-up Visit      Date: 05/15/2025  Patient Name: Amrita Buckley II  : 1965   MRN: [id number]     Chief Complaint:    Chief Complaint   Patient presents with    Primary hypertension       History of Present Illness: Amrita Buckley II is a 59 y.o. male who is here today for follow-up on his multiple medical problems.    Patient has been on dialysis for some time and waiting for his transplant.  His BMI is around 43 and has to be around 35 to be a candidate for transplant.  He is actively working on it.  He has lost about 40 pounds in the last few months.  He is doing his exercise and denies any chest pain shortness of breath dizziness palpitations or any other symptoms.    He denies any lower extremity edema.  He denies any passing out.  His blood pressure has been fluctuating off-and-on sometimes on dialysis days.      Problem List     CARDIAC  Coronary Artery Disease:   Lexiscan 2023: Normal myocardial perfusion, low risk study    Myocardium:   Echo 2023 (): EF 55-60, mildly dilated RV    Valvular:   No significant valvular disease    Electrical:   Normal sinus rhythm    Percardium:   Normal    CARDIAC RISK FACTORS  Hypertension  Diabetes  2023 A1C 14.1    Dyslipidemia  2023   HDL 34 LDL 39  2023   HDL 46 LDL 63  10/15/2024:   HDL 35 LDL 55  Obesity  Obstructive Sleep Apnea    NON-CARDIAC  Erectile dysfunction  Chronic kidney disease    SURGERIES   Left rotator cuff repair      Subjective      Review of Systems:   Review of Systems   Respiratory: Negative.     Cardiovascular: Negative.        Medications:     Current Outpatient Medications:     albuterol sulfate  (90 Base) MCG/ACT inhaler, Inhale 2 puffs Every 4 (Four) Hours As Needed for Wheezing., Disp: 6.7 g, Rfl: 0    amLODIPine (NORVASC) 10 MG tablet, Take 1 tablet by mouth Daily., Disp: , Rfl:     atorvastatin (LIPITOR) 10 MG tablet, Take 1 tablet by mouth Daily., Disp: , Rfl:      Azelastine HCl 137 MCG/SPRAY solution, Administer 1 spray into each nostril twice daily, Disp: , Rfl:     bumetanide (BUMEX) 2 MG tablet, Take 1 tablet by mouth 2 (Two) Times a Day., Disp: , Rfl:     carvedilol (COREG) 3.125 MG tablet, Take 1 tablet by mouth 2 (Two) Times a Day With Meals., Disp: 180 tablet, Rfl: 3    Continuous Blood Gluc Sensor (FreeStyle Margo 2 Sensor) misc, , Disp: , Rfl:     FLUTICASONE PROPIONATE, NASAL, NA, 50 mcg into the nostril(s) as directed by provider Daily., Disp: , Rfl:     hydrALAZINE (APRESOLINE) 50 MG tablet, Take 1 tablet by mouth 3 (Three) Times a Day for 30 days. (Patient taking differently: Take 1 tablet by mouth Every 8 (Eight) Hours As Needed.), Disp: 90 tablet, Rfl: 0    HYDROcodone-acetaminophen (NORCO) 5-325 MG per tablet, Take 1 tablet by mouth Every 8 (Eight) Hours As Needed for Moderate Pain (Pain)., Disp: 10 tablet, Rfl: 0    Insulin Glargine (Lantus SoloStar) 100 UNIT/ML injection pen, Inject 18 Units under the skin into the appropriate area as directed Daily., Disp: , Rfl:     Insulin Lispro (humaLOG) 100 UNIT/ML injection, Inject  under the skin into the appropriate area as directed 3 (Three) Times a Day Before Meals. 6 unit bolus + sliding scale, 10 unit bolus if eating a high carb meal, Disp: , Rfl:     ipratropium (ATROVENT) 0.03 % nasal spray, 2 sprays into the nostril(s) as directed by provider Every 12 (Twelve) Hours., Disp: 1 each, Rfl: 11    Iron Sucrose (VENOFER IV), Infuse 100 mg into a venous catheter 3 (Three) Times a Week., Disp: , Rfl:     isosorbide mononitrate (IMDUR) 60 MG 24 hr tablet, Take 1 tablet by mouth Daily., Disp: , Rfl:     levocetirizine (Xyzal Allergy 24HR) 5 MG tablet, Take 1 tablet by mouth Daily., Disp: , Rfl:     lidocaine-prilocaine (EMLA) 2.5-2.5 % cream, APPLY SMALL AMOUNT TO ACCESS SITE (AVF) 1 HOUR BEFORE DIALYSIS. COVER WITH OCCLUSIVE DRESSING (SARAN WRAP), Disp: , Rfl:     loratadine (CLARITIN) 10 MG tablet, Take 1 tablet by  "mouth Daily., Disp: , Rfl:     Methoxy PEG-Epoetin Beta (MIRCERA IJ), Inject 200 mcg under the skin into the appropriate area as directed Every 30 (Thirty) Days. Every 2-4 weeks per clinic instruction, Disp: , Rfl:     montelukast (SINGULAIR) 10 MG tablet, Take 1 tablet by mouth Daily., Disp: , Rfl:     pantoprazole (PROTONIX) 40 MG EC tablet, Take 1 tablet by mouth., Disp: , Rfl:     promethazine-dextromethorphan (PROMETHAZINE-DM) 6.25-15 MG/5ML syrup, Take 5 ml's by mouth every 6 hours as needed, Disp: , Rfl:     sevelamer (RENAGEL) 800 MG tablet, Take 1 tablet by mouth With Snacks. 1 tab with 2 snacks a day, Disp: , Rfl:     sevelamer (RENVELA) 800 MG tablet, Take 2 tablets by mouth 3 (Three) Times a Day., Disp: , Rfl:     Allergies:   No Known Allergies    Objective     Physical Exam:  Vitals:    05/15/25 1425   BP: 102/58   BP Location: Right arm   Patient Position: Sitting   Cuff Size: Adult   Pulse: 72   SpO2: 92%   Weight: (!) 143 kg (314 lb 9.6 oz)   Height: 180.3 cm (71\")     Body mass index is 43.88 kg/m².    Constitutional:       General: Not in acute distress.     Appearance: Healthy appearance. Not in distress.     Neck:     JVP:Not elevated     Carotid artery: Normal    Pulmonary:      Effort: Pulmonary effort is normal.      Breath sounds: Normal breath sounds. No wheezing. No rhonchi. No rales.     Cardiovascular:      Normal rate. Regular rhythm. Normal S1. Normal S2.      Murmurs: There is 2/6 systolic murmur.      No gallop. No click. No rub.     Abdominal:      General: Bowel sounds are normal.      Palpations: Abdomen is soft.      Tenderness: There is no abdominal tenderness.    Extremities:     Pulses:Normal radial and pedal pulses     Edema:no edema    Smoking Cessation:   Tobacco Product History : Patient never smoked    Lab Review:   Lab Results   Component Value Date    GLUCOSE 200 (H) 04/11/2025    BUN 90 (H) 04/11/2025    CREATININE 11.56 (H) 04/11/2025    EGFRIFNONA 45 (L) 11/17/2021 "    EGFRIFAFRI 65 08/13/2015    BCR 7.8 04/11/2025    K 5.9 (H) 04/11/2025    CO2 17.0 (L) 04/11/2025    CALCIUM 8.4 (L) 04/11/2025    ALBUMIN 4.2 09/28/2024    AST 16 09/28/2024    ALT 12 09/28/2024     Lab Results   Component Value Date    WBC 10.22 04/11/2025    HGB 11.2 (L) 04/11/2025    HCT 36.5 (L) 04/11/2025    MCV 99.2 (H) 04/11/2025     04/11/2025         Assessment / Plan      Assessment:   Diagnosis Plan   1. CKD (chronic kidney disease) stage 5, GFR less than 15 ml/min        2. Mixed hyperlipidemia        3. Primary hypertension             Plan:  Patient has been on dialysis and has been tolerating it.  Patient is on active list and will be a candidate when he will lose his weight to the desired limit.  His blood pressure has been running good and sometimes it runs low on the day of dialysis.  His cholesterol has been adequately treated and we will continue with the current medications.      Follow Up:       Return in about 1 year (around 5/15/2026).    Toñito Almazan MD

## 2025-05-15 ENCOUNTER — OFFICE VISIT (OUTPATIENT)
Dept: CARDIOLOGY | Facility: CLINIC | Age: 60
End: 2025-05-15
Payer: MEDICARE

## 2025-05-15 VITALS
WEIGHT: 314.6 LBS | BODY MASS INDEX: 44.04 KG/M2 | OXYGEN SATURATION: 92 % | HEIGHT: 71 IN | HEART RATE: 72 BPM | SYSTOLIC BLOOD PRESSURE: 102 MMHG | DIASTOLIC BLOOD PRESSURE: 58 MMHG

## 2025-05-15 DIAGNOSIS — E78.2 MIXED HYPERLIPIDEMIA: ICD-10-CM

## 2025-05-15 DIAGNOSIS — I10 PRIMARY HYPERTENSION: ICD-10-CM

## 2025-05-15 DIAGNOSIS — N18.5 CKD (CHRONIC KIDNEY DISEASE) STAGE 5, GFR LESS THAN 15 ML/MIN: Primary | ICD-10-CM

## 2025-05-22 ENCOUNTER — PREP FOR SURGERY (OUTPATIENT)
Dept: OTHER | Facility: HOSPITAL | Age: 60
End: 2025-05-22
Payer: COMMERCIAL

## 2025-05-22 DIAGNOSIS — Z12.11 COLON CANCER SCREENING: Primary | ICD-10-CM

## 2025-06-17 RX ORDER — CARVEDILOL 3.12 MG/1
3.12 TABLET ORAL 2 TIMES DAILY WITH MEALS
Qty: 180 TABLET | Refills: 3 | Status: SHIPPED | OUTPATIENT
Start: 2025-06-17

## 2025-08-27 ENCOUNTER — TELEPHONE (OUTPATIENT)
Dept: GASTROENTEROLOGY | Facility: CLINIC | Age: 60
End: 2025-08-27
Payer: COMMERCIAL

## (undated) DEVICE — SUT PROLN 7/0 BV1 D/A 24IN 8702H

## (undated) DEVICE — PROXIMATE RH ROTATING HEAD SKIN STAPLERS (35 WIDE) CONTAINS 35 STAINLESS STEEL STAPLES: Brand: PROXIMATE

## (undated) DEVICE — SUT VIC 3/0 TIES J104T

## (undated) DEVICE — DRAPE,HAND,STERILE: Brand: MEDLINE

## (undated) DEVICE — SUT PROLN 6/0 BV1 D/A 30IN 8709H

## (undated) DEVICE — GLV SURG SENSICARE PI LF PF 7.0

## (undated) DEVICE — GLV SURG PREMIERPRO MIC LTX PF SZ8 BRN

## (undated) DEVICE — APPL CHLORAPREP TINTED 26ML TEAL

## (undated) DEVICE — SUT SILK 2/0 TIES 18IN A185H

## (undated) DEVICE — GLV SURG PREMIERPRO MIC LTX PF SZ7.5 BRN

## (undated) DEVICE — PENCL SMOKE/EVAC MEGADYNE TELESCP 10FT

## (undated) DEVICE — SUT MNCRYL PLS ANTIB UD 4/0 PC3 18IN

## (undated) DEVICE — TRAP FLD MINIVAC MEGADYNE 100ML

## (undated) DEVICE — DECANTER BAG 9": Brand: MEDLINE INDUSTRIES, INC.

## (undated) DEVICE — SUT MNCRYL PLS ANTIB UD 4/0 PS2 18IN

## (undated) DEVICE — GLV SURG PREMIERPRO MIC LTX PF SZ7 BRN

## (undated) DEVICE — PATIENT RETURN ELECTRODE, SINGLE-USE, CONTACT QUALITY MONITORING, ADULT, WITH 9FT CORD, FOR PATIENTS WEIGING OVER 33LBS. (15KG): Brand: MEGADYNE

## (undated) DEVICE — SYR CONTRL LUERLOK 10CC

## (undated) DEVICE — ANTIBACTERIAL UNDYED BRAIDED (POLYGLACTIN 910), SYNTHETIC ABSORBABLE SURGICAL SUTURE: Brand: COATED VICRYL

## (undated) DEVICE — LACERATION TRAY: Brand: MEDLINE INDUSTRIES, INC.

## (undated) DEVICE — NDL HYPO ECLPS SFTY 30G 1/2IN

## (undated) DEVICE — ADHS LIQ MASTISOL 2/3ML

## (undated) DEVICE — SUT SILK 4/0 TIES 18IN A183H

## (undated) DEVICE — SUT SILK 2/0 SH 30IN K833H

## (undated) DEVICE — ANTIBACTERIAL UNDYED BRAIDED (POLYGLACTIN 910), SYNTHETIC ABSORBABLE SUTURE: Brand: COATED VICRYL

## (undated) DEVICE — SUT GUT PLN 4/0 P3 18IN 1644G

## (undated) DEVICE — 450 ML BOTTLE OF 0.05% CHLORHEXIDINE GLUCONATE IN 99.95% STERILE WATER FOR IRRIGATION, USP AND APPLICATOR.: Brand: IRRISEPT ANTIMICROBIAL WOUND LAVAGE

## (undated) DEVICE — 3M™ IOBAN™ 2 ANTIMICROBIAL INCISE DRAPE 6650EZ: Brand: IOBAN™ 2

## (undated) DEVICE — GLV SURG SENSICARE PI LF PF 6.5

## (undated) DEVICE — STOCKINETTE,DOUBLE PLY,4X48,STERILE: Brand: MEDLINE

## (undated) DEVICE — PK VASC 10

## (undated) DEVICE — 3M™ TEGADERM™ CHG DRESSING 25/CARTON 4 CARTONS/CASE 1658: Brand: TEGADERM™

## (undated) DEVICE — ADHS SKIN PREMIERPRO EXOFIN TOPICAL HI/VISC .5ML

## (undated) DEVICE — ELECTRD BLD EZ CLN STD 2.5IN

## (undated) DEVICE — SUT SILK 3/0 TIES 18IN A184H

## (undated) DEVICE — STRIP PACKING W IODOFORM 1/4